# Patient Record
Sex: FEMALE | Race: WHITE | Employment: FULL TIME | URBAN - NONMETROPOLITAN AREA
[De-identification: names, ages, dates, MRNs, and addresses within clinical notes are randomized per-mention and may not be internally consistent; named-entity substitution may affect disease eponyms.]

---

## 2017-02-16 RX ORDER — FLUCONAZOLE 150 MG/1
150 TABLET ORAL ONCE
Qty: 2 TABLET | Refills: 0 | Status: SHIPPED | OUTPATIENT
Start: 2017-02-16 | End: 2017-02-16

## 2017-04-26 RX ORDER — AZITHROMYCIN 250 MG/1
TABLET, FILM COATED ORAL
Qty: 6 TABLET | Refills: 0 | Status: SHIPPED | OUTPATIENT
Start: 2017-04-26 | End: 2017-11-20

## 2017-04-26 RX ORDER — FLUCONAZOLE 150 MG/1
150 TABLET ORAL ONCE
Qty: 2 TABLET | Refills: 0 | Status: SHIPPED | OUTPATIENT
Start: 2017-04-26 | End: 2017-04-26

## 2017-06-06 ENCOUNTER — OFFICE VISIT (OUTPATIENT)
Dept: PRIMARY CARE CLINIC | Age: 62
End: 2017-06-06
Payer: COMMERCIAL

## 2017-06-06 VITALS
DIASTOLIC BLOOD PRESSURE: 81 MMHG | HEIGHT: 71 IN | HEART RATE: 78 BPM | WEIGHT: 118.06 LBS | TEMPERATURE: 98.9 F | OXYGEN SATURATION: 98 % | SYSTOLIC BLOOD PRESSURE: 134 MMHG | BODY MASS INDEX: 16.53 KG/M2

## 2017-06-06 DIAGNOSIS — Z00.00 WELLNESS EXAMINATION: Primary | ICD-10-CM

## 2017-06-06 DIAGNOSIS — Z76.89 ENCOUNTER TO ESTABLISH CARE: ICD-10-CM

## 2017-06-06 DIAGNOSIS — Z12.11 SCREENING FOR COLON CANCER: ICD-10-CM

## 2017-06-06 PROCEDURE — 99386 PREV VISIT NEW AGE 40-64: CPT | Performed by: FAMILY MEDICINE

## 2017-06-06 RX ORDER — MAGNESIUM GLUCONATE 30 MG(550)
TABLET ORAL DAILY
COMMUNITY

## 2017-06-06 RX ORDER — VITAMIN B COMPLEX
1 CAPSULE ORAL DAILY
COMMUNITY

## 2017-06-06 RX ORDER — ACETAMINOPHEN 160 MG
TABLET,DISINTEGRATING ORAL 2 TIMES DAILY
COMMUNITY

## 2017-06-06 RX ORDER — ZINC GLUCONATE 50 MG
TABLET ORAL DAILY
COMMUNITY

## 2017-06-06 RX ORDER — MAGNESIUM 200 MG
200 TABLET ORAL DAILY
COMMUNITY

## 2017-06-06 ASSESSMENT — ENCOUNTER SYMPTOMS
BACK PAIN: 0
SHORTNESS OF BREATH: 0
WHEEZING: 0
EYE PAIN: 0
COLOR CHANGE: 0
CONSTIPATION: 0
EYE REDNESS: 0
BLOOD IN STOOL: 0
TROUBLE SWALLOWING: 0
COUGH: 0
VOMITING: 0
CHEST TIGHTNESS: 0
ABDOMINAL PAIN: 0
DIARRHEA: 0

## 2017-11-20 ENCOUNTER — TELEPHONE (OUTPATIENT)
Dept: OBSTETRICS AND GYNECOLOGY | Facility: CLINIC | Age: 62
End: 2017-11-20

## 2017-11-20 DIAGNOSIS — J01.10 ACUTE NON-RECURRENT FRONTAL SINUSITIS: ICD-10-CM

## 2017-11-20 DIAGNOSIS — B37.31 YEAST VAGINITIS: Primary | ICD-10-CM

## 2017-11-20 RX ORDER — AZITHROMYCIN 250 MG/1
TABLET, FILM COATED ORAL
Qty: 6 TABLET | Refills: 0 | Status: SHIPPED | OUTPATIENT
Start: 2017-11-20 | End: 2017-11-28

## 2017-11-20 RX ORDER — FLUCONAZOLE 150 MG/1
150 TABLET ORAL
Qty: 2 TABLET | Refills: 0 | Status: SHIPPED | OUTPATIENT
Start: 2017-11-20 | End: 2017-11-28

## 2017-11-20 NOTE — TELEPHONE ENCOUNTER
Pt requesting zpack for sinus infection. Allergic to PCN. Requests diflucan also because she gets a yeast inf after abx.

## 2017-11-28 ENCOUNTER — OFFICE VISIT (OUTPATIENT)
Dept: OBSTETRICS AND GYNECOLOGY | Facility: CLINIC | Age: 62
End: 2017-11-28

## 2017-11-28 VITALS
HEIGHT: 71 IN | DIASTOLIC BLOOD PRESSURE: 82 MMHG | BODY MASS INDEX: 16.38 KG/M2 | SYSTOLIC BLOOD PRESSURE: 136 MMHG | WEIGHT: 117 LBS

## 2017-11-28 DIAGNOSIS — N89.8 VAGINAL DRYNESS: ICD-10-CM

## 2017-11-28 DIAGNOSIS — Z01.419 WELL WOMAN EXAM WITH ROUTINE GYNECOLOGICAL EXAM: Primary | ICD-10-CM

## 2017-11-28 PROCEDURE — 99396 PREV VISIT EST AGE 40-64: CPT | Performed by: NURSE PRACTITIONER

## 2017-11-28 RX ORDER — UBIDECARENONE 60 MG
CAPSULE ORAL
COMMUNITY
End: 2019-05-09

## 2017-11-28 RX ORDER — PHENOL 1.4 %
AEROSOL, SPRAY (ML) MUCOUS MEMBRANE
COMMUNITY

## 2017-11-28 RX ORDER — MAGNESIUM GLUCONATE 30 MG(550)
TABLET ORAL
COMMUNITY

## 2017-11-28 RX ORDER — VITAMIN B COMPLEX
CAPSULE ORAL
COMMUNITY

## 2017-11-28 RX ORDER — ACETAMINOPHEN 160 MG
TABLET,DISINTEGRATING ORAL
COMMUNITY

## 2017-11-28 RX ORDER — ZINC GLUCONATE 50 MG
TABLET ORAL
COMMUNITY

## 2017-11-28 NOTE — PROGRESS NOTES
"Subjective   Lilli Trivedi is a 62 y.o. female     HPI Comments: Here for yearly check up. Has no complaints.    Gynecologic Exam   The patient's pertinent negatives include no pelvic pain, vaginal bleeding or vaginal discharge. The patient is experiencing no pain. Pertinent negatives include no abdominal pain, anorexia, back pain, chills, constipation, diarrhea, discolored urine, dysuria, fever, flank pain, frequency, headaches, hematuria, joint pain, joint swelling, nausea, painful intercourse, rash, sore throat, urgency or vomiting. She is not sexually active. She uses hysterectomy for contraception. She is postmenopausal.             /82  Ht 71\" (180.3 cm)  Wt 117 lb (53.1 kg)  LMP 06/28/2001 (Exact Date)  BMI 16.32 kg/m2      Outpatient Encounter Prescriptions as of 11/28/2017   Medication Sig Dispense Refill   • b complex vitamins capsule Take  by mouth.     • calcium carbonate (OS-SHAISTA) 600 MG tablet Take  by mouth.     • Cholecalciferol (VITAMIN D3) 2000 units capsule Take  by mouth.     • Coenzyme Q10 60 MG capsule Take  by mouth.     • MAGNESIUM PO Take 200 mg by mouth.     • potassium gluconate 595 (99 K) MG tablet tablet Take  by mouth.     • THYROID PO Take  by mouth.     • VITAMIN K PO Take  by mouth.     • Zinc 50 MG tablet Take  by mouth.     • [DISCONTINUED] azithromycin (ZITHROMAX Z-RICA) 250 MG tablet Take 2 tablets the first day, then 1 tablet daily for 4 days. 6 tablet 0   • [DISCONTINUED] fluconazole (DIFLUCAN) 150 MG tablet Take 1 tablet by mouth Every 7 (Seven) Days. Take one tablet today and repeat in one week 2 tablet 0     No facility-administered encounter medications on file as of 11/28/2017.            Surgical History  Past Surgical History:   Procedure Laterality Date   • HYSTERECTOMY      with BSO         Family History  Family History   Problem Relation Age of Onset   • Lung cancer Father    • No Known Problems Mother    • Prostate cancer Brother 80   • No Known Problems " Sister    • Breast cancer Maternal Grandmother 80   • Prostate cancer Maternal Grandfather 70   • Ovarian cancer Neg Hx    • Uterine cancer Neg Hx    • Colon cancer Neg Hx    • Melanoma Neg Hx              The following portions of the patient's history were reviewed and updated as appropriate: allergies, current medications, past family history, past medical history, past social history, past surgical history and problem list.    Review of Systems   Constitutional: Negative for activity change, appetite change, chills, diaphoresis, fatigue, fever and unexpected weight change.   HENT: Negative for congestion, ear discharge, ear pain, facial swelling, hearing loss, mouth sores, nosebleeds, postnasal drip, rhinorrhea, sinus pressure, sneezing, sore throat, tinnitus, trouble swallowing and voice change.    Eyes: Negative for photophobia, pain, discharge, redness, itching and visual disturbance.   Respiratory: Negative for apnea, cough, choking, chest tightness and shortness of breath.    Cardiovascular: Negative for chest pain, palpitations and leg swelling.   Gastrointestinal: Negative for abdominal distention, abdominal pain, anal bleeding, anorexia, blood in stool, constipation, diarrhea, nausea, rectal pain and vomiting.   Endocrine: Negative for cold intolerance and heat intolerance.   Genitourinary: Negative for decreased urine volume, difficulty urinating, dyspareunia, dysuria, flank pain, frequency, genital sores, hematuria, menstrual problem, pelvic pain, urgency, vaginal bleeding, vaginal discharge and vaginal pain.   Musculoskeletal: Negative for arthralgias, back pain, joint pain, joint swelling and myalgias.   Skin: Negative for color change and rash.   Allergic/Immunologic: Negative for environmental allergies.   Neurological: Negative for dizziness, syncope, weakness, numbness and headaches.   Hematological: Negative for adenopathy.   Psychiatric/Behavioral: Negative for agitation, confusion and sleep  disturbance. The patient is not nervous/anxious.              Objective   Physical Exam   Constitutional: She is oriented to person, place, and time. She appears well-developed and well-nourished. No distress.   HENT:   Head: Normocephalic.   Right Ear: External ear normal.   Left Ear: External ear normal.   Nose: Nose normal.   Mouth/Throat: Oropharynx is clear and moist.   Eyes: Conjunctivae are normal. Right eye exhibits no discharge. Left eye exhibits no discharge. No scleral icterus.   Neck: Normal range of motion. Neck supple. Carotid bruit is not present. No tracheal deviation present. No thyromegaly present.   Cardiovascular: Normal rate, regular rhythm, normal heart sounds and intact distal pulses.    No murmur heard.  Pulmonary/Chest: Effort normal and breath sounds normal. No respiratory distress. She has no wheezes. Right breast exhibits no inverted nipple, no mass, no nipple discharge, no skin change and no tenderness. Left breast exhibits no inverted nipple, no mass, no nipple discharge, no skin change and no tenderness. Breasts are symmetrical. There is no breast swelling.   Abdominal: Soft. She exhibits no distension and no mass. There is no tenderness. There is no guarding. No hernia. Hernia confirmed negative in the right inguinal area and confirmed negative in the left inguinal area.   Genitourinary: Rectum normal and vagina normal. Rectal exam shows no mass. No breast tenderness, discharge or bleeding. Pelvic exam was performed with patient supine. There is no rash, tenderness, lesion or injury on the right labia. There is no rash, tenderness, lesion or injury on the left labia. Uterus is not fixed and not tender. Right adnexum displays no mass, no tenderness and no fullness. Left adnexum displays no mass, no tenderness and no fullness. No erythema, tenderness or bleeding in the vagina. No foreign body in the vagina. No signs of injury around the vagina. No vaginal discharge found.    Genitourinary Comments:   BSU normal  Urethral meatus  Normal  Perineum  Normal  Cervix and Uterus are surgically absent.   Vaginal vault is very atrophic.   Musculoskeletal: Normal range of motion. She exhibits no edema or tenderness.   Lymphadenopathy:        Head (right side): No submental, no submandibular, no tonsillar, no preauricular, no posterior auricular and no occipital adenopathy present.        Head (left side): No submental, no submandibular, no tonsillar, no preauricular, no posterior auricular and no occipital adenopathy present.     She has no cervical adenopathy.        Right cervical: No superficial cervical, no deep cervical and no posterior cervical adenopathy present.       Left cervical: No superficial cervical, no deep cervical and no posterior cervical adenopathy present.     She has no axillary adenopathy.        Right: No inguinal adenopathy present.        Left: No inguinal adenopathy present.   Neurological: She is alert and oriented to person, place, and time. Coordination normal.   Skin: Skin is warm and dry. No bruising and no rash noted. She is not diaphoretic. No erythema.   Psychiatric: She has a normal mood and affect. Her behavior is normal. Judgment and thought content normal.   Nursing note and vitals reviewed.        Assessment/Plan   Lilli was seen today for gynecologic exam.    Diagnoses and all orders for this visit:    Well woman exam with routine gynecological exam  Normal GYN exam. Will have lab work today . Encouraged SBE. Discussed weight management and importance of maintaining a healthy weight. Discussed Vitamin D intake and the importance of adequate vitamin D. Discussed Daily exercise and the importance of same. BMI  16.3. Pt declines Colonoscopy, had a fit test at her PCP, that was negative.   Mammogram will be scheduled at [facility]. Pap smear is not done per [facility] guidelines.    -     CBC & Differential  -     Comprehensive Metabolic Panel  -     Lipid Panel With LDL  / HDL Ratio  -     TSH  -     T3, Uptake  -     Vitamin D 25 Hydroxy  -     T4, Free  -     Hemoglobin A1c  -     UA / M With / Rflx Culture([facility] ONLY) - Urine, Clean Catch  -     DEXA Bone Density Axial; Future  -     Mammo Screening Digital Tomosynthesis Bilateral With CAD; Future    Vaginal dryness  Comments:  Pt. is having vaginal dryness. Does not want any hormones. Will try Coconut Oil daily.

## 2017-12-06 LAB
25(OH)D3+25(OH)D2 SERPL-MCNC: 66.8 NG/ML (ref 30–100)
ALBUMIN SERPL-MCNC: 4.4 G/DL (ref 3.5–5)
ALBUMIN/GLOB SERPL: 1.6 G/DL (ref 1.1–2.5)
ALP SERPL-CCNC: 104 U/L (ref 24–120)
ALT SERPL-CCNC: 42 U/L (ref 0–54)
APPEARANCE UR: CLEAR
AST SERPL-CCNC: 37 U/L (ref 7–45)
BACTERIA #/AREA URNS HPF: ABNORMAL /HPF
BASOPHILS # BLD AUTO: 0.05 10*3/MM3 (ref 0–0.2)
BASOPHILS NFR BLD AUTO: 0.6 % (ref 0–2)
BILIRUB SERPL-MCNC: 0.5 MG/DL (ref 0.1–1)
BILIRUB UR QL STRIP: NEGATIVE
BUN SERPL-MCNC: 14 MG/DL (ref 5–21)
BUN/CREAT SERPL: 18.2 (ref 7–25)
CALCIUM SERPL-MCNC: 10.1 MG/DL (ref 8.4–10.4)
CASTS URNS MICRO: ABNORMAL
CASTS URNS QL MICRO: PRESENT /LPF
CHLORIDE SERPL-SCNC: 102 MMOL/L (ref 98–110)
CHOLEST SERPL-MCNC: 145 MG/DL (ref 130–200)
CO2 SERPL-SCNC: 29 MMOL/L (ref 24–31)
COLOR UR: YELLOW
CREAT SERPL-MCNC: 0.77 MG/DL (ref 0.5–1.4)
CRYSTALS URNS MICRO: ABNORMAL
EOSINOPHIL # BLD AUTO: 0.07 10*3/MM3 (ref 0–0.7)
EOSINOPHIL NFR BLD AUTO: 0.9 % (ref 0–4)
EPI CELLS #/AREA URNS HPF: ABNORMAL /HPF
ERYTHROCYTE [DISTWIDTH] IN BLOOD BY AUTOMATED COUNT: 13.6 % (ref 12–15)
GFR SERPLBLD CREATININE-BSD FMLA CKD-EPI: 76 ML/MIN/1.73
GFR SERPLBLD CREATININE-BSD FMLA CKD-EPI: 92 ML/MIN/1.73
GLOBULIN SER CALC-MCNC: 2.7 GM/DL
GLUCOSE SERPL-MCNC: 86 MG/DL (ref 70–100)
GLUCOSE UR QL: NEGATIVE
HBA1C MFR BLD: 5.1 %
HCT VFR BLD AUTO: 45.5 % (ref 37–47)
HDLC SERPL-MCNC: 77 MG/DL
HGB BLD-MCNC: 14.6 G/DL (ref 12–16)
HGB UR QL STRIP: NEGATIVE
IMM GRANULOCYTES # BLD: 0.03 10*3/MM3 (ref 0–0.03)
IMM GRANULOCYTES NFR BLD: 0.4 % (ref 0–5)
KETONES UR QL STRIP: NEGATIVE
LDLC SERPL CALC-MCNC: 59 MG/DL (ref 0–99)
LDLC/HDLC SERPL: 0.77 {RATIO}
LEUKOCYTE ESTERASE UR QL STRIP: NEGATIVE
LYMPHOCYTES # BLD AUTO: 2.21 10*3/MM3 (ref 0.72–4.86)
LYMPHOCYTES NFR BLD AUTO: 27 % (ref 15–45)
MCH RBC QN AUTO: 30.4 PG (ref 28–32)
MCHC RBC AUTO-ENTMCNC: 32.1 G/DL (ref 33–36)
MCV RBC AUTO: 94.8 FL (ref 82–98)
MICRO URNS: NORMAL
MICRO URNS: NORMAL
MONOCYTES # BLD AUTO: 0.7 10*3/MM3 (ref 0.19–1.3)
MONOCYTES NFR BLD AUTO: 8.5 % (ref 4–12)
MUCOUS THREADS URNS QL MICRO: PRESENT /HPF
NEUTROPHILS # BLD AUTO: 5.13 10*3/MM3 (ref 1.87–8.4)
NEUTROPHILS NFR BLD AUTO: 62.6 % (ref 39–78)
NITRITE UR QL STRIP: NEGATIVE
NRBC BLD AUTO-RTO: 0 /100 WBC (ref 0–0)
PH UR STRIP: 5.5 [PH] (ref 5–7.5)
PLATELET # BLD AUTO: 249 10*3/MM3 (ref 130–400)
POTASSIUM SERPL-SCNC: 4.1 MMOL/L (ref 3.5–5.3)
PROT SERPL-MCNC: 7.1 G/DL (ref 6.3–8.7)
PROT UR QL STRIP: NEGATIVE
RBC # BLD AUTO: 4.8 10*6/MM3 (ref 4.2–5.4)
RBC #/AREA URNS HPF: ABNORMAL /HPF
SODIUM SERPL-SCNC: 142 MMOL/L (ref 135–145)
SP GR UR: 1.02 (ref 1–1.03)
T3RU NFR SERPL: 27 % (ref 24–39)
T4 FREE SERPL-MCNC: 1.13 NG/DL (ref 0.78–2.19)
TRIGL SERPL-MCNC: 45 MG/DL (ref 0–149)
TSH SERPL DL<=0.005 MIU/L-ACNC: 1.37 MIU/ML (ref 0.47–4.68)
UNIDENT CRYS URNS QL MICRO: PRESENT /LPF
URINALYSIS REFLEX: NORMAL
UROBILINOGEN UR STRIP-MCNC: 0.2 MG/DL (ref 0.2–1)
VLDLC SERPL CALC-MCNC: 9 MG/DL
WBC # BLD AUTO: 8.19 10*3/MM3 (ref 4.8–10.8)
WBC #/AREA URNS HPF: ABNORMAL /HPF

## 2017-12-12 ENCOUNTER — HOSPITAL ENCOUNTER (OUTPATIENT)
Dept: MAMMOGRAPHY | Facility: HOSPITAL | Age: 62
Discharge: HOME | End: 2017-12-12
Admitting: NURSE PRACTITIONER

## 2017-12-12 DIAGNOSIS — Z01.419 WELL WOMAN EXAM WITH ROUTINE GYNECOLOGICAL EXAM: ICD-10-CM

## 2017-12-12 PROCEDURE — G0202 SCR MAMMO BI INCL CAD: HCPCS

## 2017-12-12 PROCEDURE — 77063 BREAST TOMOSYNTHESIS BI: CPT

## 2017-12-18 ENCOUNTER — TELEPHONE (OUTPATIENT)
Dept: OBSTETRICS AND GYNECOLOGY | Facility: CLINIC | Age: 62
End: 2017-12-18

## 2017-12-18 DIAGNOSIS — J01.11 ACUTE RECURRENT FRONTAL SINUSITIS: Primary | ICD-10-CM

## 2017-12-18 RX ORDER — AZITHROMYCIN 250 MG/1
TABLET, FILM COATED ORAL
Qty: 6 TABLET | Refills: 0 | Status: SHIPPED | OUTPATIENT
Start: 2017-12-18 | End: 2018-11-14

## 2017-12-18 NOTE — TELEPHONE ENCOUNTER
Pt is calling requesting a zpac sent in. Advised pt Ju was out of the office and she said she would wait until she got back. She has cough and sinus congestion. Would like it sent to [facility]

## 2018-01-18 ENCOUNTER — OFFICE VISIT (OUTPATIENT)
Dept: OBSTETRICS AND GYNECOLOGY | Facility: CLINIC | Age: 63
End: 2018-01-18

## 2018-01-18 VITALS
DIASTOLIC BLOOD PRESSURE: 64 MMHG | SYSTOLIC BLOOD PRESSURE: 108 MMHG | HEIGHT: 71 IN | BODY MASS INDEX: 16.38 KG/M2 | WEIGHT: 117 LBS

## 2018-01-18 DIAGNOSIS — R42 DIZZINESS: Primary | ICD-10-CM

## 2018-01-18 DIAGNOSIS — R09.89 BILATERAL CAROTID BRUITS: ICD-10-CM

## 2018-01-18 DIAGNOSIS — B37.9 CANDIDIASIS: ICD-10-CM

## 2018-01-18 PROCEDURE — 99213 OFFICE O/P EST LOW 20 MIN: CPT | Performed by: NURSE PRACTITIONER

## 2018-01-18 RX ORDER — FLUCONAZOLE 150 MG/1
150 TABLET ORAL ONCE
Qty: 2 TABLET | Refills: 0 | Status: SHIPPED | OUTPATIENT
Start: 2018-01-18 | End: 2018-01-18

## 2018-01-18 NOTE — PROGRESS NOTES
"Subjective     Lilli Trivedi is a 62 y.o. female    HPI Comments: Here for c/o dizziness.    Dizziness   This is a recurrent problem. The current episode started more than 1 month ago. The problem has been waxing and waning. Pertinent negatives include no abdominal pain, anorexia, arthralgias, change in bowel habit, chest pain, chills, congestion, coughing, diaphoresis, fatigue, fever, headaches, joint swelling, myalgias, nausea, neck pain, numbness, rash, sore throat, swollen glands, urinary symptoms, vertigo, visual change, vomiting or weakness. Nothing aggravates the symptoms. She has tried nothing for the symptoms.         /64  Ht 180.3 cm (71\")  Wt 53.1 kg (117 lb)  LMP 06/28/2001 (Exact Date)  BMI 16.32 kg/m2    Outpatient Encounter Prescriptions as of 1/18/2018   Medication Sig Dispense Refill   • b complex vitamins capsule Take  by mouth.     • calcium carbonate (OS-SHAISTA) 600 MG tablet Take  by mouth.     • Cholecalciferol (VITAMIN D3) 2000 units capsule Take  by mouth.     • Coenzyme Q10 60 MG capsule Take  by mouth.     • MAGNESIUM PO Take 200 mg by mouth.     • potassium gluconate 595 (99 K) MG tablet tablet Take  by mouth.     • THYROID PO Take  by mouth.     • Zinc 50 MG tablet Take  by mouth.     • azithromycin (ZITHROMAX Z-RICA) 250 MG tablet Take 2 tablets the first day, then 1 tablet daily for 4 days. 6 tablet 0   • fluconazole (DIFLUCAN) 150 MG tablet Take 1 tablet by mouth 1 (One) Time for 1 dose. Take once a week for 2 weeks. 2 tablet 0   • VITAMIN K PO Take  by mouth.       No facility-administered encounter medications on file as of 1/18/2018.        Surgical History  Past Surgical History:   Procedure Laterality Date   • HYSTERECTOMY      with BSO       Family History  Family History   Problem Relation Age of Onset   • Lung cancer Father    • No Known Problems Mother    • Prostate cancer Brother 80   • No Known Problems Sister    • Breast cancer Maternal Grandmother 80   • Prostate " cancer Maternal Grandfather 70   • Ovarian cancer Neg Hx    • Uterine cancer Neg Hx    • Colon cancer Neg Hx    • Melanoma Neg Hx        The following portions of the patient's history were reviewed and updated as appropriate: allergies, current medications, past family history, past medical history, past social history, past surgical history and problem list.    Review of Systems   Constitutional: Negative for activity change, appetite change, chills, diaphoresis, fatigue, fever and unexpected weight change.   HENT: Negative for congestion, dental problem, drooling, ear discharge, ear pain, facial swelling, hearing loss, mouth sores, nosebleeds, postnasal drip, rhinorrhea, sinus pain, sinus pressure, sneezing, sore throat, tinnitus, trouble swallowing and voice change.    Eyes: Negative for photophobia, pain, discharge, redness, itching and visual disturbance.   Respiratory: Negative for apnea, cough, choking, chest tightness, shortness of breath, wheezing and stridor.    Cardiovascular: Negative for chest pain, palpitations and leg swelling.   Gastrointestinal: Negative for abdominal distention, abdominal pain, anal bleeding, anorexia, blood in stool, change in bowel habit, constipation, diarrhea, nausea, rectal pain and vomiting.   Endocrine: Negative for cold intolerance, heat intolerance, polydipsia, polyphagia and polyuria.   Genitourinary: Negative for decreased urine volume, difficulty urinating, dyspareunia, dysuria, enuresis, flank pain, frequency, genital sores, hematuria, menstrual problem, pelvic pain, urgency, vaginal bleeding, vaginal discharge and vaginal pain.   Musculoskeletal: Negative for arthralgias, back pain, gait problem, joint swelling, myalgias, neck pain and neck stiffness.   Skin: Negative for color change, pallor, rash and wound.   Allergic/Immunologic: Negative for environmental allergies, food allergies and immunocompromised state.   Neurological: Positive for dizziness. Negative for  vertigo, tremors, seizures, syncope, facial asymmetry, speech difficulty, weakness, light-headedness, numbness and headaches.   Hematological: Negative for adenopathy. Does not bruise/bleed easily.   Psychiatric/Behavioral: Negative for agitation, behavioral problems, confusion, decreased concentration, dysphoric mood, hallucinations, self-injury, sleep disturbance and suicidal ideas. The patient is not nervous/anxious and is not hyperactive.        Objective   Physical Exam   Constitutional: She is oriented to person, place, and time. She appears well-developed and well-nourished.   HENT:   Head: Normocephalic and atraumatic.   Eyes: Conjunctivae are normal. Right eye exhibits no discharge. Left eye exhibits no discharge.   Neck: Normal range of motion. Neck supple. Carotid bruit is present. No thyromegaly present.   Cardiovascular: Normal rate, regular rhythm and normal heart sounds.    Pulmonary/Chest: Effort normal and breath sounds normal.   Neurological: She is alert and oriented to person, place, and time.   Skin: Skin is warm and dry.   Psychiatric: She has a normal mood and affect. Her behavior is normal. Judgment and thought content normal.   Nursing note and vitals reviewed.      Assessment/Plan   Lilli was seen today for results.    Diagnoses and all orders for this visit:    Dizziness  Comments:  Pt has occassional dizziness.  Orders:  -     US Carotid Bilateral; Future    Bilateral carotid bruits  Comments:  Pt has very mild Bruits bilateral. Will have carotid study at [facility].   Orders:  -     US Carotid Bilateral; Future    Candidiasis  Comments:  Feels she is getting a yeast infection and requests Diflucan.  Orders:  -     fluconazole (DIFLUCAN) 150 MG tablet; Take 1 tablet by mouth 1 (One) Time for 1 dose. Take once a week for 2 weeks.

## 2018-02-02 ENCOUNTER — APPOINTMENT (OUTPATIENT)
Dept: ULTRASOUND IMAGING | Facility: HOSPITAL | Age: 63
End: 2018-02-02

## 2018-02-13 ENCOUNTER — HOSPITAL ENCOUNTER (OUTPATIENT)
Dept: ULTRASOUND IMAGING | Facility: HOSPITAL | Age: 63
Discharge: HOME | End: 2018-02-13
Admitting: NURSE PRACTITIONER

## 2018-02-13 DIAGNOSIS — R09.89 BILATERAL CAROTID BRUITS: ICD-10-CM

## 2018-02-13 DIAGNOSIS — R42 DIZZINESS: ICD-10-CM

## 2018-02-13 PROCEDURE — 93880 EXTRACRANIAL BILAT STUDY: CPT | Performed by: SURGERY

## 2018-02-13 PROCEDURE — 93880 EXTRACRANIAL BILAT STUDY: CPT

## 2018-02-14 DIAGNOSIS — R09.89 BRUIT OF LEFT CAROTID ARTERY: Primary | ICD-10-CM

## 2018-02-20 ENCOUNTER — APPOINTMENT (OUTPATIENT)
Dept: ULTRASOUND IMAGING | Facility: HOSPITAL | Age: 63
End: 2018-02-20

## 2018-02-23 ENCOUNTER — TELEPHONE (OUTPATIENT)
Dept: VASCULAR SURGERY | Facility: CLINIC | Age: 63
End: 2018-02-23

## 2018-02-23 NOTE — TELEPHONE ENCOUNTER
Left message for Mrs Trivedi and let her know we had to move her appointment from Thursday, March 8th, 2018 to Monday, February 26th, 2018 at 1 pm and advised if she had any questions or needed to reschedule to please call the office at [phone].

## 2018-02-26 ENCOUNTER — OFFICE VISIT (OUTPATIENT)
Dept: VASCULAR SURGERY | Facility: CLINIC | Age: 63
End: 2018-02-26

## 2018-02-26 ENCOUNTER — TELEPHONE (OUTPATIENT)
Dept: VASCULAR SURGERY | Facility: CLINIC | Age: 63
End: 2018-02-26

## 2018-02-26 VITALS
SYSTOLIC BLOOD PRESSURE: 110 MMHG | DIASTOLIC BLOOD PRESSURE: 70 MMHG | HEIGHT: 71 IN | OXYGEN SATURATION: 99 % | WEIGHT: 124 LBS | BODY MASS INDEX: 17.36 KG/M2 | HEART RATE: 87 BPM

## 2018-02-26 DIAGNOSIS — I65.23 BILATERAL CAROTID ARTERY STENOSIS: Primary | ICD-10-CM

## 2018-02-26 PROCEDURE — 99213 OFFICE O/P EST LOW 20 MIN: CPT | Performed by: NURSE PRACTITIONER

## 2018-02-26 NOTE — PROGRESS NOTES
02/26/2018      Ju Hawkins, APRN  [address]  [address]  [place] [address]    Lilli Trivedi  1955    Chief Complaint   Patient presents with   • Carotid Artery Disease     Carotid Bruite        Dear RASHI Yo:      HPI  I had the pleasure of seeing your patient Lilli Trivedi in the office today.  Thank you kindly for this consultation.  As you recall, Lilli Trivedi is a 62 y.o.  female who you are currently following for routine gynecological care.  She did have some recent complaints of dizziness.  She was also noted to have a carotid bruit on exam on the left and sent for testing.  She has been to our office in the past but has been several years ago.  She denies any strokelike symptoms or claudicating symptoms to her lower extremities.She did have recent testing including a carotid duplex, which I did review in office.    History reviewed. No pertinent past medical history.    Past Surgical History:   Procedure Laterality Date   • HYSTERECTOMY      with BSO       Family History   Problem Relation Age of Onset   • Lung cancer Father    • No Known Problems Mother    • Prostate cancer Brother 80   • No Known Problems Sister    • Breast cancer Maternal Grandmother 80   • Prostate cancer Maternal Grandfather 70   • Ovarian cancer Neg Hx    • Uterine cancer Neg Hx    • Colon cancer Neg Hx    • Melanoma Neg Hx        Social History     Social History   • Marital status:      Spouse name: N/A   • Number of children: N/A   • Years of education: N/A     Occupational History   • Not on file.     Social History Main Topics   • Smoking status: Never Smoker   • Smokeless tobacco: Never Used   • Alcohol use Yes   • Drug use: Not on file   • Sexual activity: Yes     Birth control/ protection: Post-menopausal     Other Topics Concern   • Not on file     Social History Narrative       Allergies   Allergen Reactions   • Penicillins Shortness Of Breath   • Peanut-Containing Drug Products        Prior to  "Admission medications    Medication Sig Start Date End Date Taking? Authorizing Provider   azithromycin (ZITHROMAX Z-RICA) 250 MG tablet Take 2 tablets the first day, then 1 tablet daily for 4 days. 12/18/17   RASHI Yo complex vitamins capsule Take  by mouth.    Historical Provider, MD   calcium carbonate (OS-SHAISTA) 600 MG tablet Take  by mouth.    Historical Provider, MD   Cholecalciferol (VITAMIN D3) 2000 units capsule Take  by mouth.    Historical Provider, MD   Coenzyme Q10 60 MG capsule Take  by mouth.    Historical Provider, MD   MAGNESIUM PO Take 200 mg by mouth.    Historical Provider, MD   potassium gluconate 595 (99 K) MG tablet tablet Take  by mouth.    Historical Provider, MD   THYROID PO Take  by mouth.    Historical Provider, MD   VITAMIN K PO Take  by mouth.    Historical Provider, MD   Zinc 50 MG tablet Take  by mouth.    Historical Provider, MD       Review of Systems   Constitutional: Negative.    HENT: Negative.    Eyes: Negative.    Respiratory: Negative.    Cardiovascular: Negative.    Gastrointestinal: Negative.    Endocrine: Negative.    Genitourinary: Negative.    Musculoskeletal: Negative.    Skin: Negative.    Allergic/Immunologic: Negative.    Neurological: Negative.    Hematological: Negative.    Psychiatric/Behavioral: Negative.        /70 (BP Location: Left arm, Patient Position: Sitting)  Pulse 87  Ht 180 cm (70.87\")  Wt 56.2 kg (124 lb)  LMP 06/28/2001 (Exact Date)  SpO2 99%  BMI 17.36 kg/m2  Physical Exam   Constitutional: She is oriented to person, place, and time. She appears well-developed and well-nourished. No distress.   HENT:   Head: Normocephalic and atraumatic.   Mouth/Throat: Oropharynx is clear and moist.   Eyes: Pupils are equal, round, and reactive to light. No scleral icterus.   Neck: Normal range of motion. Neck supple. No JVD present. Carotid bruit is present (left ). No thyromegaly present.   Cardiovascular: Normal rate, regular rhythm, S2 " normal, normal heart sounds, intact distal pulses and normal pulses.  Exam reveals no gallop and no friction rub.    No murmur heard.  Pulmonary/Chest: Effort normal and breath sounds normal.   Abdominal: Soft. Normal aorta and bowel sounds are normal. There is no hepatosplenomegaly.   Musculoskeletal: Normal range of motion.   Neurological: She is alert and oriented to person, place, and time. No cranial nerve deficit.   Skin: Skin is warm and dry. She is not diaphoretic.   Psychiatric: She has a normal mood and affect. Her behavior is normal. Judgment and thought content normal.   Nursing note and vitals reviewed.      Us Carotid Bilateral    Result Date: 2/13/2018  Narrative: History: Carotid occlusive disease      Impression: Impression: 1. There is less than 50% stenosis of the right internal carotid artery. 2. There is less than 50% stenosis of the left internal carotid artery. 3. Antegrade flow is demonstrated in bilateral vertebral arteries.  Comments: Bilateral carotid vertebral arterial duplex scan was performed.  Grayscale imaging shows intimal thickening and calcified elements at the carotid bifurcation. The right internal carotid artery peak systolic velocity is 88.5 cm/sec. The end-diastolic velocity is 32.2 cm/sec. The right ICA/CCA ratio is approximately 0.82 . These findings correlate with less than 50% stenosis of the right internal carotid artery.  Grayscale imaging shows intimal thickening and calcified elements at the carotid bifurcation. The left internal carotid artery peak systolic velocity is 82.1 cm/sec. The end-diastolic velocity is 28.1 cm/sec. The left ICA/CCA ratio is approximately 0.63 . These findings correlate with less than 50% stenosis of the left internal carotid artery.  Antegrade flow is demonstrated in bilateral vertebral arteries. There is greater than 50% stenosis in the left common carotid artery. This report was finalized on 02/13/2018 18:26 by Dr. Sandor Rene,  MD.      There is no problem list on file for this patient.        ICD-10-CM ICD-9-CM   1. Bilateral carotid artery stenosis I65.23 433.10     433.30       Lab Frequency Next Occurrence   DEXA Bone Density Axial Once 12/3/2017   US Carotid Bilateral Once 2/26/2019       Plan: After thoroughly evaluating Lilli Trivedi, I believe the best course of action is to remain conservative from a vascular standpoint.  We will see Lilli Trivedi back in 1 year with repeat noninvasive testing for continued surveillance, including a carotid duplex.  She should continue diet and exercise.  The patient can continue taking her current medication regimen as previously planned.  This was all discussed in full with complete understanding.    Thank you for allowing me to participate in the care of your patient.  Please do not hesitate with any questions or concerns.  I will keep you aware of any further encounters with Lilli Trivedi.        Sincerely yours,         RASHI Lira    No Known Provider

## 2018-06-04 ENCOUNTER — TELEPHONE (OUTPATIENT)
Dept: PRIMARY CARE CLINIC | Age: 63
End: 2018-06-04

## 2018-09-05 RX ORDER — AZITHROMYCIN 250 MG/1
TABLET, FILM COATED ORAL
Qty: 6 TABLET | Refills: 0 | OUTPATIENT
Start: 2018-09-05

## 2018-11-07 ENCOUNTER — TELEPHONE (OUTPATIENT)
Dept: OBSTETRICS AND GYNECOLOGY | Facility: CLINIC | Age: 63
End: 2018-11-07

## 2018-11-07 DIAGNOSIS — B37.9 CANDIDIASIS: Primary | ICD-10-CM

## 2018-11-07 RX ORDER — FLUCONAZOLE 150 MG/1
150 TABLET ORAL ONCE
Qty: 2 TABLET | Refills: 0 | Status: SHIPPED | OUTPATIENT
Start: 2018-11-07 | End: 2018-11-07

## 2018-11-14 ENCOUNTER — TELEPHONE (OUTPATIENT)
Dept: OBSTETRICS AND GYNECOLOGY | Facility: CLINIC | Age: 63
End: 2018-11-14

## 2018-11-14 DIAGNOSIS — J34.89 SINUS DRAINAGE: Primary | ICD-10-CM

## 2018-11-14 RX ORDER — AZITHROMYCIN 250 MG/1
TABLET, FILM COATED ORAL
Qty: 6 TABLET | Refills: 0 | Status: SHIPPED | OUTPATIENT
Start: 2018-11-14 | End: 2019-05-09

## 2018-11-14 NOTE — TELEPHONE ENCOUNTER
Pt is calling asking if she can get a zpac sent to [facility] pharmacy. She is home sick from work today.   I also let pt know she is due for yearly end of this month and she will call to schedule that.

## 2019-01-31 ENCOUNTER — TELEPHONE (OUTPATIENT)
Dept: VASCULAR SURGERY | Facility: CLINIC | Age: 64
End: 2019-01-31

## 2019-01-31 NOTE — TELEPHONE ENCOUNTER
Patient called in to cancel her testing and office visit.  She said she did not want to reschedule at this time.

## 2019-03-04 ENCOUNTER — TELEPHONE (OUTPATIENT)
Dept: VASCULAR SURGERY | Facility: CLINIC | Age: 64
End: 2019-03-04

## 2019-03-04 NOTE — TELEPHONE ENCOUNTER
Left message for Ms Trivedi in regards to testing and office appointments missed on 02/12/2019. I ask she call the office at [phone] to get these all rescheduled.

## 2019-05-09 ENCOUNTER — OFFICE VISIT (OUTPATIENT)
Dept: OBSTETRICS AND GYNECOLOGY | Facility: CLINIC | Age: 64
End: 2019-05-09

## 2019-05-09 VITALS
HEIGHT: 71 IN | BODY MASS INDEX: 16.52 KG/M2 | DIASTOLIC BLOOD PRESSURE: 68 MMHG | WEIGHT: 118 LBS | SYSTOLIC BLOOD PRESSURE: 122 MMHG

## 2019-05-09 DIAGNOSIS — N89.8 VAGINAL DISCHARGE: Primary | ICD-10-CM

## 2019-05-09 DIAGNOSIS — R35.0 URINARY FREQUENCY: ICD-10-CM

## 2019-05-09 PROCEDURE — 87512 GARDNER VAG DNA QUANT: CPT | Performed by: NURSE PRACTITIONER

## 2019-05-09 PROCEDURE — 87798 DETECT AGENT NOS DNA AMP: CPT | Performed by: NURSE PRACTITIONER

## 2019-05-09 PROCEDURE — 87661 TRICHOMONAS VAGINALIS AMPLIF: CPT | Performed by: NURSE PRACTITIONER

## 2019-05-09 PROCEDURE — 87481 CANDIDA DNA AMP PROBE: CPT | Performed by: NURSE PRACTITIONER

## 2019-05-09 PROCEDURE — 99213 OFFICE O/P EST LOW 20 MIN: CPT | Performed by: NURSE PRACTITIONER

## 2019-05-09 RX ORDER — FLUCONAZOLE 150 MG/1
150 TABLET ORAL ONCE
Qty: 2 TABLET | Refills: 0 | Status: SHIPPED | OUTPATIENT
Start: 2019-05-09 | End: 2019-05-09

## 2019-05-09 NOTE — PROGRESS NOTES
"Subjective     Lilli Trivedi is a 63 y.o. female    Patient is here today for complaint of vaginal irritation burning and discharge also has some urinary frequency.  She has not had her yearly checkup in several years plans on making that appointment on a different date.      Vaginal Discharge   The patient's primary symptoms include genital itching and vaginal discharge. The patient's pertinent negatives include no pelvic pain or vaginal bleeding. This is a recurrent problem. The current episode started in the past 7 days. The problem occurs daily. The problem has been unchanged. The patient is experiencing no pain. She is not pregnant. Associated symptoms include frequency. Pertinent negatives include no abdominal pain, anorexia, back pain, chills, constipation, diarrhea, discolored urine, dysuria, fever, flank pain, headaches, hematuria, joint pain, joint swelling, nausea, painful intercourse, rash, sore throat, urgency or vomiting. The vaginal discharge was white. There has been no bleeding. She is not sexually active. She is postmenopausal.         /68   Ht 180.3 cm (71\")   Wt 53.5 kg (118 lb)   LMP 06/28/2001 (Exact Date)   Breastfeeding? No   BMI 16.46 kg/m²     Outpatient Encounter Medications as of 5/9/2019   Medication Sig Dispense Refill   • b complex vitamins capsule Take  by mouth.     • calcium carbonate (OS-SHAISTA) 600 MG tablet Take  by mouth.     • Cholecalciferol (VITAMIN D3) 2000 units capsule Take  by mouth.     • MAGNESIUM PO Take 200 mg by mouth.     • potassium gluconate 595 (99 K) MG tablet tablet Take  by mouth.     • THYROID PO Take  by mouth.     • Zinc 50 MG tablet Take  by mouth.     • fluconazole (DIFLUCAN) 150 MG tablet Take 1 tablet by mouth 1 (One) Time for 1 dose. Take once a week for 2 weeks. 2 tablet 0   • [DISCONTINUED] azithromycin (ZITHROMAX Z-RICA) 250 MG tablet Take 2 tablets the first day, then 1 tablet daily for 4 days. 6 tablet 0   • [DISCONTINUED] Coenzyme Q10 60 MG " capsule Take  by mouth.     • [DISCONTINUED] VITAMIN K PO Take  by mouth.       No facility-administered encounter medications on file as of 5/9/2019.        Surgical History  Past Surgical History:   Procedure Laterality Date   • HYSTERECTOMY      with BSO       Family History  Family History   Problem Relation Age of Onset   • Lung cancer Father    • No Known Problems Mother    • Prostate cancer Brother 80   • No Known Problems Sister    • Breast cancer Maternal Grandmother 80   • Prostate cancer Maternal Grandfather 70   • Ovarian cancer Neg Hx    • Uterine cancer Neg Hx    • Colon cancer Neg Hx    • Melanoma Neg Hx        The following portions of the patient's history were reviewed and updated as appropriate: allergies, current medications, past family history, past medical history, past social history, past surgical history and problem list.    Review of Systems   Constitutional: Negative for activity change, appetite change, chills, diaphoresis, fatigue, fever, unexpected weight gain and unexpected weight loss.   HENT: Negative for congestion, dental problem, drooling, ear discharge, ear pain, facial swelling, hearing loss, mouth sores, nosebleeds, postnasal drip, rhinorrhea, sinus pressure, sneezing, sore throat, tinnitus, trouble swallowing and voice change.    Eyes: Negative for blurred vision, double vision, photophobia, pain, discharge, redness, itching and visual disturbance.   Respiratory: Negative for apnea, cough, choking, chest tightness, shortness of breath, wheezing and stridor.    Cardiovascular: Negative for chest pain, palpitations and leg swelling.   Gastrointestinal: Negative for abdominal distention, abdominal pain, anal bleeding, anorexia, blood in stool, constipation, diarrhea, nausea, rectal pain, vomiting, GERD and indigestion.   Endocrine: Negative for cold intolerance, heat intolerance, polydipsia, polyphagia and polyuria.   Genitourinary: Positive for frequency and vaginal discharge.  Negative for breast discharge, urinary incontinence, decreased libido, decreased urine volume, difficulty urinating, dyspareunia, dysuria, flank pain, genital sores, hematuria, menstrual problem, pelvic pain, urgency, vaginal bleeding, vaginal pain, breast lump and breast pain.   Musculoskeletal: Negative for arthralgias, back pain, gait problem, joint pain, joint swelling, myalgias, neck pain, neck stiffness and bursitis.   Skin: Negative for color change, dry skin and rash.   Allergic/Immunologic: Negative for environmental allergies, food allergies and immunocompromised state.   Neurological: Negative for dizziness, tremors, seizures, syncope, facial asymmetry, speech difficulty, weakness, light-headedness, numbness, headache, memory problem and confusion.   Hematological: Negative for adenopathy. Does not bruise/bleed easily.   Psychiatric/Behavioral: Negative for agitation, behavioral problems, decreased concentration, dysphoric mood, hallucinations, self-injury, sleep disturbance, suicidal ideas, negative for hyperactivity, depressed mood and stress. The patient is not nervous/anxious.        Objective   Physical Exam   Constitutional: She is oriented to person, place, and time. She appears well-developed and well-nourished.   HENT:   Head: Normocephalic and atraumatic.   Cardiovascular: Normal rate and regular rhythm.   Abdominal: Soft. Bowel sounds are normal. She exhibits no distension. There is no tenderness. Hernia confirmed negative in the right inguinal area and confirmed negative in the left inguinal area.   Genitourinary: There is tenderness on the right labia. There is no rash or lesion on the right labia. There is tenderness on the left labia. There is no rash or lesion on the left labia. Cervix exhibits no discharge. Right adnexum displays no mass, no tenderness and no fullness. Left adnexum displays no mass, no tenderness and no fullness. There is erythema and tenderness in the vagina. Vaginal  discharge found.   Genitourinary Comments: Vaginal vault is atrophic.  Cervix uterus and adnexa are surgically absent   Lymphadenopathy:        Right: No inguinal adenopathy present.        Left: No inguinal adenopathy present.   Neurological: She is alert and oriented to person, place, and time.   Skin: Skin is warm and dry.   Psychiatric: She has a normal mood and affect. Her behavior is normal. Judgment and thought content normal.   Nursing note and vitals reviewed.      Assessment/Plan   Lilli was seen today for vaginitis.    Diagnoses and all orders for this visit:    Vaginal discharge  Comments:  Patient has complaint of vaginal discharge and burning.  She is very dry on exam.  BV panel is sent to [facility] she is given Diflucan today.  Orders:  -     Gynecologic Fluid, Supplemental Testing  -     fluconazole (DIFLUCAN) 150 MG tablet; Take 1 tablet by mouth 1 (One) Time for 1 dose. Take once a week for 2 weeks.    Urinary frequency  Comments:  Urine is sent for culture.  Orders:  -     UA / M With / Rflx Culture([facility] ONLY) - Urine, Clean Catch         Patient's Body mass index is 16.46 kg/m². BMI is normal.      Ju Hawkins, APRN  5/9/2019

## 2019-05-09 NOTE — PROGRESS NOTES
Attempted to obtain health maintenance information, patient unable to provide answers for the following items Pneumococcal Vaccine, Medicare Annual Wellness Exam, Diabetic Eye Exam, Diabetic Foot Exam, HPV Vaccine, Chlamydia Screening , Influenza Vaccine and Zoster Vaccine.

## 2019-05-09 NOTE — PATIENT INSTRUCTIONS

## 2019-05-10 LAB
APPEARANCE UR: CLEAR
BACTERIA #/AREA URNS HPF: ABNORMAL /HPF
BILIRUB UR QL STRIP: NEGATIVE
COLOR UR: YELLOW
CRYSTALS URNS MICRO: ABNORMAL
EPI CELLS #/AREA URNS HPF: ABNORMAL /HPF
GLUCOSE UR QL: ABNORMAL
HGB UR QL STRIP: ABNORMAL
KETONES UR QL STRIP: NEGATIVE
LEUKOCYTE ESTERASE UR QL STRIP: NEGATIVE
MICRO URNS: ABNORMAL
MUCOUS THREADS URNS QL MICRO: PRESENT /HPF
NITRITE UR QL STRIP: NEGATIVE
PH UR STRIP: 5.5 [PH] (ref 5–7.5)
PROT UR QL STRIP: NEGATIVE
RBC #/AREA URNS HPF: ABNORMAL /HPF
SP GR UR: 1.03 (ref 1–1.03)
UNIDENT CRYS URNS QL MICRO: PRESENT /LPF
URINALYSIS REFLEX: ABNORMAL
UROBILINOGEN UR STRIP-MCNC: 0.2 MG/DL (ref 0.2–1)
WBC #/AREA URNS HPF: ABNORMAL /HPF

## 2019-05-13 LAB — TRICHOMONAS VAGINALIS PCR: NOT DETECTED

## 2019-05-15 ENCOUNTER — TELEPHONE (OUTPATIENT)
Dept: OBSTETRICS AND GYNECOLOGY | Facility: CLINIC | Age: 64
End: 2019-05-15

## 2019-05-15 LAB
GEN CATEG CVX/VAG CYTO-IMP: NORMAL
LAB AP CASE REPORT: NORMAL
Lab: NORMAL
PATH INTERP SPEC-IMP: NORMAL
STAT OF ADQ CVX/VAG CYTO-IMP: NORMAL

## 2019-09-26 ENCOUNTER — OFFICE VISIT (OUTPATIENT)
Dept: OBSTETRICS AND GYNECOLOGY | Facility: CLINIC | Age: 64
End: 2019-09-26

## 2019-09-26 VITALS
DIASTOLIC BLOOD PRESSURE: 78 MMHG | BODY MASS INDEX: 15.4 KG/M2 | SYSTOLIC BLOOD PRESSURE: 132 MMHG | WEIGHT: 110 LBS | HEIGHT: 71 IN

## 2019-09-26 DIAGNOSIS — Z12.31 ENCOUNTER FOR SCREENING MAMMOGRAM FOR BREAST CANCER: ICD-10-CM

## 2019-09-26 DIAGNOSIS — J32.0 CHRONIC MAXILLARY SINUSITIS: ICD-10-CM

## 2019-09-26 DIAGNOSIS — R09.89 BRUIT: ICD-10-CM

## 2019-09-26 DIAGNOSIS — Z13.820 ENCOUNTER FOR SCREENING FOR OSTEOPOROSIS: ICD-10-CM

## 2019-09-26 DIAGNOSIS — Z01.419 WELL WOMAN EXAM WITH ROUTINE GYNECOLOGICAL EXAM: Primary | ICD-10-CM

## 2019-09-26 DIAGNOSIS — Z80.3 FAMILY HISTORY OF BREAST CANCER: ICD-10-CM

## 2019-09-26 PROCEDURE — 99396 PREV VISIT EST AGE 40-64: CPT | Performed by: NURSE PRACTITIONER

## 2019-09-26 RX ORDER — AZITHROMYCIN 250 MG/1
TABLET, FILM COATED ORAL
Qty: 6 TABLET | Refills: 0 | Status: SHIPPED | OUTPATIENT
Start: 2019-09-26 | End: 2021-12-15

## 2019-09-26 RX ORDER — FLUCONAZOLE 150 MG/1
150 TABLET ORAL ONCE
Qty: 2 TABLET | Refills: 0 | Status: SHIPPED | OUTPATIENT
Start: 2019-09-26 | End: 2019-09-26

## 2019-09-26 NOTE — PROGRESS NOTES
"Subjective     Lilli Trivedi is a 64 y.o. female    Patient is here today for yearly checkup she has no complaints.      Gynecologic Exam   The patient's pertinent negatives include no pelvic pain, vaginal bleeding or vaginal discharge. The patient is experiencing no pain. Pertinent negatives include no abdominal pain, anorexia, back pain, chills, constipation, diarrhea, discolored urine, dysuria, fever, flank pain, frequency, headaches, hematuria, joint pain, joint swelling, nausea, painful intercourse, rash, sore throat, urgency or vomiting. She is sexually active. She is postmenopausal.         /78   Ht 180.3 cm (71\")   Wt 49.9 kg (110 lb)   LMP 06/28/2001 (Exact Date)   Breastfeeding? No   BMI 15.34 kg/m²     Outpatient Encounter Medications as of 9/26/2019   Medication Sig Dispense Refill   • b complex vitamins capsule Take  by mouth.     • calcium carbonate (OS-SHAISTA) 600 MG tablet Take  by mouth.     • Cholecalciferol (VITAMIN D3) 2000 units capsule Take  by mouth.     • MAGNESIUM PO Take 200 mg by mouth.     • potassium gluconate 595 (99 K) MG tablet tablet Take  by mouth.     • THYROID PO Take  by mouth.     • Zinc 50 MG tablet Take  by mouth.     • azithromycin (ZITHROMAX Z-RICA) 250 MG tablet Take 2 tablets the first day, then 1 tablet daily for 4 days. 6 tablet 0   • fluconazole (DIFLUCAN) 150 MG tablet Take 1 tablet by mouth 1 (One) Time for 1 dose. Take once a week for 2 weeks. 2 tablet 0     No facility-administered encounter medications on file as of 9/26/2019.        Surgical History  Past Surgical History:   Procedure Laterality Date   • HYSTERECTOMY      with BSO   • TONSILLECTOMY         Family History  Family History   Problem Relation Age of Onset   • Lung cancer Father    • No Known Problems Mother    • Prostate cancer Brother 80   • No Known Problems Sister    • Breast cancer Maternal Grandmother 80   • Prostate cancer Maternal Grandfather 70   • Ovarian cancer Neg Hx    • Uterine " cancer Neg Hx    • Colon cancer Neg Hx    • Melanoma Neg Hx        The following portions of the patient's history were reviewed and updated as appropriate: allergies, current medications, past family history, past medical history, past social history, past surgical history and problem list.    Review of Systems   Constitutional: Negative for activity change, appetite change, chills, diaphoresis, fatigue, fever, unexpected weight gain and unexpected weight loss.   HENT: Negative for congestion, dental problem, drooling, ear discharge, ear pain, facial swelling, hearing loss, mouth sores, nosebleeds, postnasal drip, rhinorrhea, sinus pressure, sneezing, sore throat, swollen glands, tinnitus, trouble swallowing and voice change.    Eyes: Negative for blurred vision, double vision, photophobia, pain, discharge, redness, itching and visual disturbance.   Respiratory: Negative for apnea, cough, choking, chest tightness, shortness of breath, wheezing and stridor.    Cardiovascular: Negative for chest pain, palpitations and leg swelling.   Gastrointestinal: Negative for abdominal distention, abdominal pain, anal bleeding, anorexia, blood in stool, constipation, diarrhea, nausea, rectal pain, vomiting, GERD and indigestion.   Endocrine: Negative for cold intolerance, heat intolerance, polydipsia, polyphagia and polyuria.   Genitourinary: Negative for amenorrhea, breast discharge, breast lump, breast pain, decreased libido, decreased urine volume, difficulty urinating, dyspareunia, dysuria, flank pain, frequency, genital sores, hematuria, menstrual problem, pelvic pain, pelvic pressure, urgency, urinary incontinence, vaginal bleeding, vaginal discharge and vaginal pain.   Musculoskeletal: Negative for arthralgias, back pain, gait problem, joint pain, joint swelling, myalgias, neck pain, neck stiffness and bursitis.   Skin: Negative for color change, dry skin and rash.   Allergic/Immunologic: Negative for environmental  allergies, food allergies and immunocompromised state.   Neurological: Negative for dizziness, tremors, seizures, syncope, facial asymmetry, speech difficulty, weakness, light-headedness, numbness, headache, memory problem and confusion.   Hematological: Negative for adenopathy. Does not bruise/bleed easily.   Psychiatric/Behavioral: Negative for agitation, behavioral problems, decreased concentration, dysphoric mood, hallucinations, self-injury, sleep disturbance, suicidal ideas, negative for hyperactivity, depressed mood and stress. The patient is not nervous/anxious.        Objective   Physical Exam   Constitutional: She is oriented to person, place, and time. She appears well-developed and well-nourished.   HENT:   Head: Normocephalic and atraumatic.   Right Ear: External ear normal.   Left Ear: External ear normal.   Eyes: Conjunctivae and EOM are normal. Right eye exhibits no discharge. Left eye exhibits no discharge. No scleral icterus.   Neck: Normal range of motion. Neck supple. Carotid bruit is not present. No thyromegaly present.   Cardiovascular: Regular rhythm and normal heart sounds.   No murmur heard.  Pulmonary/Chest: Effort normal and breath sounds normal. No respiratory distress. Right breast exhibits no inverted nipple, no mass, no nipple discharge, no skin change and no tenderness. Left breast exhibits no inverted nipple, no mass, no nipple discharge, no skin change and no tenderness. Breasts are symmetrical. There is no breast swelling.   Abdominal: Soft. Bowel sounds are normal. She exhibits no distension and no mass. There is no tenderness. There is no guarding. No hernia. Hernia confirmed negative in the right inguinal area and confirmed negative in the left inguinal area.   Genitourinary: Vagina normal. Rectal exam shows no mass. No breast tenderness, discharge or bleeding. There is no rash, tenderness, lesion or injury on the right labia. There is no rash, tenderness, lesion or injury on  the left labia. No erythema or bleeding in the vagina. No signs of injury around the vagina. No vaginal discharge found.   Genitourinary Comments: Cervix, Uterus and Adnexa are surgically absent.  Urethra and urethral meatus normal  Bladder, normal no prolapse  Perineum and anus examined and without lesions   Musculoskeletal: Normal range of motion. She exhibits no edema or tenderness.   Lymphadenopathy:        Head (right side): No submental, no submandibular, no tonsillar, no preauricular, no posterior auricular and no occipital adenopathy present.        Head (left side): No submental, no submandibular, no tonsillar, no preauricular, no posterior auricular and no occipital adenopathy present.     She has no cervical adenopathy.        Right cervical: No superficial cervical, no deep cervical and no posterior cervical adenopathy present.       Left cervical: No superficial cervical, no deep cervical and no posterior cervical adenopathy present.     She has no axillary adenopathy.        Right: No inguinal adenopathy present.        Left: No inguinal adenopathy present.   Neurological: She is alert and oriented to person, place, and time. She exhibits normal muscle tone. Coordination normal.   Skin: Skin is warm and dry. No bruising and no rash noted. No erythema.   Psychiatric: She has a normal mood and affect. Her behavior is normal. Judgment and thought content normal.   Nursing note and vitals reviewed.      Assessment/Plan   [place] was seen today for annual exam.    Diagnoses and all orders for this visit:    Well woman exam with routine gynecological exam  Normal GYN exam. Will have lab work at PCP. Encouraged SBE, pt is aware how to do self breast exam and the importance of same. Discussed weight management and importance of maintaining a healthy weight. Discussed Vitamin D intake and the importance of adequate vitamin D for both Bone Health and a healthy immune system.  Discussed Daily exercise and the  importance of same, in regards to a healthy heart as well as helping to maintain her weight and improving her mental health.  BMI 15.3.  Patient continues to decline colonoscopy.  He does agree to have Cologuard.  I have sent an order for same.  Mammogram and bone density will be scheduled at [facility].  Pap smear is not done per [facility] guidelines.  -     Cologuard - Stool, Per Rectum; Future    Encounter for screening mammogram for breast cancer  Comments:  Have mammogram at [facility].  Orders:  -     Mammo Screening Digital Tomosynthesis Bilateral With CAD; Future    Encounter for screening for osteoporosis  Comments:  Patient will have bone density at [facility].  Orders:  -     DEXA Bone Density Axial; Future    Bruit  Comments:  Patient will have carotid studies at [facility].  Orders:  -     US Carotid Bilateral; Future    Family history of breast cancer  Pt has a family history of breast cancer. We discussed Genetic screening that can be done to see if she carries the Gene for Breast, Ovarian, Colon, Melanoma, Uterine and Pancreatic Cancers. We discussed if positive she will have free Genetic counseling through [facility]. We also discussed if she does have the positive gene she can have more testing yearly, and even surgery if desired.    Chronic maxillary sinusitis  Comments:  Patient requests a Z-Rica and Diflucan to take as needed if she gets a sinus infection.  She states she usually gets one in the fall when the seasons change.  Orders:  -     azithromycin (ZITHROMAX Z-RICA) 250 MG tablet; Take 2 tablets the first day, then 1 tablet daily for 4 days.  -     fluconazole (DIFLUCAN) 150 MG tablet; Take 1 tablet by mouth 1 (One) Time for 1 dose. Take once a week for 2 weeks.         Patient's Body mass index is 15.34 kg/m². BMI is below normal parameters. Recommendations include: none (medical contraindication).      Ju Hawkins, APRN  9/26/2019

## 2019-09-26 NOTE — PATIENT INSTRUCTIONS

## 2019-10-01 ENCOUNTER — RESULTS ENCOUNTER (OUTPATIENT)
Dept: OBSTETRICS AND GYNECOLOGY | Facility: CLINIC | Age: 64
End: 2019-10-01

## 2019-10-01 DIAGNOSIS — Z01.419 WELL WOMAN EXAM WITH ROUTINE GYNECOLOGICAL EXAM: ICD-10-CM

## 2019-10-08 ENCOUNTER — HOSPITAL ENCOUNTER (OUTPATIENT)
Dept: ULTRASOUND IMAGING | Facility: HOSPITAL | Age: 64
Discharge: HOME | End: 2019-10-08

## 2019-10-08 ENCOUNTER — APPOINTMENT (OUTPATIENT)
Dept: ULTRASOUND IMAGING | Facility: HOSPITAL | Age: 64
End: 2019-10-08

## 2019-10-08 ENCOUNTER — APPOINTMENT (OUTPATIENT)
Dept: MAMMOGRAPHY | Facility: HOSPITAL | Age: 64
End: 2019-10-08

## 2019-10-08 ENCOUNTER — HOSPITAL ENCOUNTER (OUTPATIENT)
Dept: MAMMOGRAPHY | Facility: HOSPITAL | Age: 64
Discharge: HOME | End: 2019-10-08
Admitting: NURSE PRACTITIONER

## 2019-10-08 DIAGNOSIS — Z12.31 ENCOUNTER FOR SCREENING MAMMOGRAM FOR BREAST CANCER: ICD-10-CM

## 2019-10-08 DIAGNOSIS — R09.89 BRUIT: ICD-10-CM

## 2019-10-08 PROCEDURE — 93880 EXTRACRANIAL BILAT STUDY: CPT | Performed by: SURGERY

## 2019-10-08 PROCEDURE — 93880 EXTRACRANIAL BILAT STUDY: CPT

## 2019-10-08 PROCEDURE — 77067 SCR MAMMO BI INCL CAD: CPT

## 2019-10-08 PROCEDURE — 77063 BREAST TOMOSYNTHESIS BI: CPT

## 2021-08-24 NOTE — TELEPHONE ENCOUNTER
Done. JESUSITA
Pt is requesting medication for yeast infection to [facility]  
Pt notified  
no...

## 2021-12-15 ENCOUNTER — OFFICE VISIT (OUTPATIENT)
Dept: OBSTETRICS AND GYNECOLOGY | Facility: CLINIC | Age: 66
End: 2021-12-15

## 2021-12-15 VITALS
DIASTOLIC BLOOD PRESSURE: 74 MMHG | HEIGHT: 71 IN | BODY MASS INDEX: 15.96 KG/M2 | WEIGHT: 114 LBS | SYSTOLIC BLOOD PRESSURE: 112 MMHG

## 2021-12-15 DIAGNOSIS — Z78.0 POST-MENOPAUSAL: ICD-10-CM

## 2021-12-15 DIAGNOSIS — Z78.9 NON-SMOKER: ICD-10-CM

## 2021-12-15 DIAGNOSIS — Z01.419 ENCOUNTER FOR GYNECOLOGICAL EXAMINATION WITHOUT ABNORMAL FINDING: Primary | ICD-10-CM

## 2021-12-15 DIAGNOSIS — Z13.820 OSTEOPOROSIS SCREENING: ICD-10-CM

## 2021-12-15 DIAGNOSIS — Z12.31 ENCOUNTER FOR SCREENING MAMMOGRAM FOR MALIGNANT NEOPLASM OF BREAST: ICD-10-CM

## 2021-12-15 PROCEDURE — G0101 CA SCREEN;PELVIC/BREAST EXAM: HCPCS | Performed by: NURSE PRACTITIONER

## 2021-12-15 RX ORDER — PSEUDOEPHEDRINE HCL 30 MG/1
TABLET, FILM COATED ORAL
COMMUNITY
Start: 2021-11-20 | End: 2023-04-03

## 2021-12-15 NOTE — PATIENT INSTRUCTIONS
"BMI for Adults  What is BMI?  Body mass index (BMI) is a number that is calculated from a person's weight and height. BMI can help estimate how much of a person's weight is composed of fat. BMI does not measure body fat directly. Rather, it is an alternative to procedures that directly measure body fat, which can be difficult and expensive.  BMI can help identify people who may be at higher risk for certain medical problems.  What are BMI measurements used for?  BMI is used as a screening tool to identify possible weight problems. It helps determine whether a person is obese, overweight, a healthy weight, or underweight.  BMI is useful for:  · Identifying a weight problem that may be related to a medical condition or may increase the risk for medical problems.  · Promoting changes, such as changes in diet and exercise, to help reach a healthy weight. BMI screening can be repeated to see if these changes are working.  How is BMI calculated?  BMI involves measuring your weight in relation to your height. Both height and weight are measured, and the BMI is calculated from those numbers. This can be done either in English ([place] or metric measurements. Note that charts and online BMI calculators are available to help you find your BMI quickly and easily without having to do these calculations yourself.  To calculate your BMI in English ([place] measurements:    1. Measure your weight in pounds (lb).  2. Multiply the number of pounds by 703.  ? For example, for a person who weighs 180 lb, multiply that number by 703, which equals 126,540.  3. Measure your height in inches. Then multiply that number by itself to get a measurement called \"inches squared.\"  ? For example, for a person who is 70 inches tall, the \"inches squared\" measurement is 70 inches x 70 inches, which equals 4,900 inches squared.  4. Divide the total from step 2 (number of lb x 703) by the total from step 3 (inches squared): 126,540 ÷ 4,900 = 25.8. This is " "your BMI.    To calculate your BMI in metric measurements:  1. Measure your weight in kilograms (kg).  2. Measure your height in meters (m). Then multiply that number by itself to get a measurement called \"meters squared.\"  ? For example, for a person who is 1.75 m tall, the \"meters squared\" measurement is 1.75 m x 1.75 m, which is equal to 3.1 meters squared.  3. Divide the number of kilograms (your weight) by the meters squared number. In this example: 70 ÷ 3.1 = 22.6. This is your BMI.  What do the results mean?  BMI charts are used to identify whether you are underweight, normal weight, overweight, or obese. The following guidelines will be used:  · Underweight: BMI less than 18.5.  · Normal weight: BMI between 18.5 and 24.9.  · Overweight: BMI between 25 and 29.9.  · Obese: BMI of 30 or above.  Keep these notes in mind:  · Weight includes both fat and muscle, so someone with a muscular build, such as an athlete, may have a BMI that is higher than 24.9. In cases like these, BMI is not an accurate measure of body fat.  · To determine if excess body fat is the cause of a BMI of 25 or higher, further assessments may need to be done by a health care provider.  · BMI is usually interpreted in the same way for men and women.  Where to find more information  For more information about BMI, including tools to quickly calculate your BMI, go to these websites:  · Centers for Disease Control and Prevention: www.cdc.gov  · American Heart Association: www.heart.org  · National Heart, Lung, and Blood [place]: www.nhlbi.nih.gov  Summary  · Body mass index (BMI) is a number that is calculated from a person's weight and height.  · BMI may help estimate how much of a person's weight is composed of fat. BMI can help identify those who may be at higher risk for certain medical problems.  · BMI can be measured using English measurements or metric measurements.  · BMI charts are used to identify whether you are underweight, normal " weight, overweight, or obese.  This information is not intended to replace advice given to you by your health care provider. Make sure you discuss any questions you have with your health care provider.  Document Revised: 09/09/2020 Document Reviewed: 07/17/2020  Elsevier Patient Education © 2021 [employer]

## 2021-12-15 NOTE — PROGRESS NOTES
"Getachew Trivedi is a 66 y.o. female.     Annual exam.       The following portions of the patient's history were reviewed and updated as appropriate: allergies, current medications, past family history, past medical history, past social history, past surgical history and problem list.    /74   Ht 180.3 cm (71\")   Wt 51.7 kg (114 lb)   LMP 06/28/2001 (Exact Date)   BMI 15.90 kg/m²     Review of Systems   Constitutional: Negative for activity change, appetite change, fatigue and fever.   HENT: Negative for congestion, sore throat and trouble swallowing.    Eyes: Negative for pain, discharge and visual disturbance.   Respiratory: Negative for apnea, shortness of breath and wheezing.    Cardiovascular: Negative for chest pain, palpitations and leg swelling.   Gastrointestinal: Negative for abdominal pain, constipation and diarrhea.   Genitourinary: Negative for dyspareunia, dysuria, frequency, pelvic pain, urgency and vaginal discharge.   Musculoskeletal: Negative for back pain and gait problem.   Skin: Negative for color change and rash.   Neurological: Negative for dizziness, weakness and numbness.   Psychiatric/Behavioral: Negative for confusion and sleep disturbance.       Objective   Physical Exam  Vitals and nursing note reviewed.   Constitutional:       Appearance: She is well-developed.   HENT:      Head: Normocephalic and atraumatic.      Right Ear: External ear normal.      Left Ear: External ear normal.   Eyes:      General: No scleral icterus.        Right eye: No discharge.         Left eye: No discharge.      Conjunctiva/sclera: Conjunctivae normal.   Neck:      Thyroid: No thyromegaly.      Vascular: No carotid bruit.   Cardiovascular:      Rate and Rhythm: Regular rhythm.      Heart sounds: Normal heart sounds. No murmur heard.      Pulmonary:      Effort: Pulmonary effort is normal. No respiratory distress.      Breath sounds: Normal breath sounds.   Chest:   Breasts: Breasts are " symmetrical.      Right: No inverted nipple, mass, nipple discharge, skin change or tenderness.      Left: No inverted nipple, mass, nipple discharge, skin change or tenderness.       Abdominal:      General: Bowel sounds are normal. There is no distension.      Palpations: Abdomen is soft. There is no mass.      Tenderness: There is no abdominal tenderness. There is no guarding.      Hernia: No hernia is present. There is no hernia in the left inguinal area.   Genitourinary:     Labia:         Right: No rash, tenderness, lesion or injury.         Left: No rash, tenderness, lesion or injury.       Vagina: Normal. No signs of injury. No vaginal discharge, erythema or bleeding.      Rectum: No mass.      Comments: Cervix, Uterus and Adnexa are surgically absent.  Urethra and urethral meatus normal  Bladder, normal no prolapse  Perineum and anus examined and without lesions  Musculoskeletal:         General: No tenderness. Normal range of motion.      Cervical back: Normal range of motion and neck supple.   Lymphadenopathy:      Head:      Right side of head: No submental, submandibular, tonsillar, preauricular, posterior auricular or occipital adenopathy.      Left side of head: No submental, submandibular, tonsillar, preauricular, posterior auricular or occipital adenopathy.      Cervical: No cervical adenopathy.      Right cervical: No superficial, deep or posterior cervical adenopathy.     Left cervical: No superficial, deep or posterior cervical adenopathy.   Skin:     General: Skin is warm and dry.      Findings: No bruising, erythema or rash.   Neurological:      Mental Status: She is alert and oriented to person, place, and time.      Motor: No abnormal muscle tone.      Coordination: Coordination normal.   Psychiatric:         Behavior: Behavior normal.         Thought Content: Thought content normal.         Judgment: Judgment normal.         Assessment/Plan   Well woman GYN exam.   Pap smear not indiated per  ASCCP guidelines.   Will have lab work at PCP.     Encouraged SBE, pt is aware how to do self breast exam and the importance of same.   Discussed weight management and importance of maintaining a healthy weight.   Discussed Vitamin D intake and the importance of adequate vitamin D for both Bone Health and a healthy immune system.    Discussed Daily exercise and the importance of same, in regards to a healthy heart as well as helping to maintain her weight and improving her mental health.     Colonoscopy pt adamantly declines referral for colonoscopy.   Pt declines cologuard.     Bone density ordered.     Discussed STD prevention and testing.   Pt declines STD testing.     Mammogram will be scheduled at ordered.     Pt requests zpack and diflucan.   Pt advised to call with any questions or concerns.   Discussed S&S to report. Pt voiced understanding.     Patient's Body mass index is 15.9 kg/m². indicating that she is underweight (BMI < 18.5). Recommendations include: educational material.    RV annual exam/prn.   Diagnoses and all orders for this visit:    1. Encounter for gynecological examination without abnormal finding (Primary)    2. Non-smoker    3. Encounter for screening mammogram for malignant neoplasm of breast  -     Mammo Screening Digital Tomosynthesis Bilateral With CAD; Future    4. Osteoporosis screening  -     DEXA Bone Density Axial; Future    5. Post-menopausal  -     DEXA Bone Density Axial; Future    Other orders  -     azithromycin (Zithromax Z-Pardeep) 250 MG tablet; Take 2 tablets (500 mg) on  Day 1,  followed by 1 tablet (250 mg) once daily on Days 2 through 5.  Dispense: 5 tablet; Refill: 0  -     fluconazole (Diflucan) 150 MG tablet; Take 1 tablet by mouth Every Other Day for 2 doses.  Dispense: 2 tablet; Refill: 0

## 2021-12-27 ENCOUNTER — TELEPHONE (OUTPATIENT)
Dept: OBSTETRICS AND GYNECOLOGY | Facility: CLINIC | Age: 66
End: 2021-12-27

## 2021-12-27 RX ORDER — AZITHROMYCIN 250 MG/1
TABLET, FILM COATED ORAL
Qty: 5 TABLET | Refills: 0 | Status: SHIPPED | OUTPATIENT
Start: 2021-12-27 | End: 2022-08-17

## 2021-12-27 RX ORDER — FLUCONAZOLE 150 MG/1
150 TABLET ORAL EVERY OTHER DAY
Qty: 2 TABLET | Refills: 0 | Status: SHIPPED | OUTPATIENT
Start: 2021-12-27 | End: 2021-12-30

## 2022-01-11 ENCOUNTER — OFFICE VISIT (OUTPATIENT)
Dept: INTERNAL MEDICINE | Facility: CLINIC | Age: 67
End: 2022-01-11

## 2022-01-11 VITALS
HEART RATE: 76 BPM | SYSTOLIC BLOOD PRESSURE: 108 MMHG | TEMPERATURE: 97.8 F | WEIGHT: 115 LBS | DIASTOLIC BLOOD PRESSURE: 77 MMHG | BODY MASS INDEX: 16.1 KG/M2 | HEIGHT: 71 IN | OXYGEN SATURATION: 99 % | RESPIRATION RATE: 18 BRPM

## 2022-01-11 DIAGNOSIS — H69.82 EUSTACHIAN TUBE DYSFUNCTION, LEFT: Primary | ICD-10-CM

## 2022-01-11 PROBLEM — J30.2 SEASONAL ALLERGIC RHINITIS: Status: ACTIVE | Noted: 2022-01-11

## 2022-01-11 PROCEDURE — 99213 OFFICE O/P EST LOW 20 MIN: CPT | Performed by: NURSE PRACTITIONER

## 2022-01-11 RX ORDER — PREDNISONE 20 MG/1
20 TABLET ORAL DAILY
Qty: 5 TABLET | Refills: 0 | Status: SHIPPED | OUTPATIENT
Start: 2022-01-11 | End: 2022-08-17

## 2022-01-11 RX ORDER — TRIAMCINOLONE ACETONIDE 55 UG/1
2 SPRAY, METERED NASAL DAILY
Qty: 10.8 ML | Refills: 0 | Status: SHIPPED | OUTPATIENT
Start: 2022-01-11 | End: 2023-01-11

## 2022-01-11 NOTE — PROGRESS NOTES
Subjective     Chief Complaint   Patient presents with   • Earache     Left Ear       History of Present Illness  Pt comes in today to establish care here. Followed with Dr. Jarocho Winter in the past. She complains of an earache. Left ear, started 2 weeks ago. Started after being exposed to cinnamon. No congestion. States her sinuses feel clogged. This has happened in the past and states has been taking sudafed and antihistamine. States she thinks it has helped. She likes to keep the azithromycin script for when she needs it. She is not vaccinated against COVID.     Review of Systems   Constitutional: Negative for fatigue and fever.   HENT: Negative for sinus pressure and sinus pain.    Eyes: Positive for visual disturbance ( wears glasses).   Respiratory: Negative for cough and shortness of breath.    Cardiovascular: Negative for chest pain and palpitations.   Gastrointestinal: Negative for constipation, diarrhea and vomiting.   Endocrine: Negative for polydipsia, polyphagia and polyuria.   Genitourinary: Negative for dysuria and urgency.   Musculoskeletal: Negative for back pain, gait problem and joint swelling.   Allergic/Immunologic: Negative for immunocompromised state.   Neurological: Negative for seizures and syncope.   Hematological: Does not bruise/bleed easily.   Psychiatric/Behavioral: Negative for decreased concentration, dysphoric mood and hallucinations.         Past Medical History:   Past Medical History:   Diagnosis Date   • Disease of thyroid gland      Past Surgical History:  Past Surgical History:   Procedure Laterality Date   • HYSTERECTOMY      with BSO   • TONSILLECTOMY       Social History:  reports that she has never smoked. She has never used smokeless tobacco. She reports current alcohol use. She reports that she does not use drugs.    Family History: family history includes Breast cancer (age of onset: 80) in her maternal grandmother; Lung cancer in her father; No Known Problems in  "her mother and sister; Prostate cancer (age of onset: 70) in her maternal grandfather; Prostate cancer (age of onset: 80) in her brother.      Allergies:  Allergies   Allergen Reactions   • Cinnamon Anaphylaxis   • Penicillins Shortness Of Breath   • Peanut-Containing Drug Products      Medications:  Prior to Admission medications    Medication Sig Start Date End Date Taking? Authorizing Provider   b complex vitamins capsule Take  by mouth.   Yes Abbi De Jesus MD   calcium carbonate (OS-SHAISTA) 600 MG tablet Take  by mouth.   Yes Abbi De Jesus MD   Cholecalciferol (VITAMIN D3) 2000 units capsule Take  by mouth.   Yes Abbi De Jesus MD   MAGNESIUM PO Take 200 mg by mouth.   Yes Abbi De Jesus MD   potassium gluconate 595 (99 K) MG tablet tablet Take  by mouth.   Yes Abbi De Jesus MD   SudoGest Maximum Strength 30 MG tablet  11/20/21  Yes Abbi De Jesus MD   THYROID PO Take  by mouth.   Yes Abbi De Jesus MD   Zinc 50 MG tablet Take  by mouth.   Yes Abbi De Jesus MD   azithromycin (Zithromax Z-Pardeep) 250 MG tablet Take 2 tablets (500 mg) on  Day 1,  followed by 1 tablet (250 mg) once daily on Days 2 through 5. 12/27/21   Minal Winter APRN       Objective     Vital Signs: /77 (BP Location: Right arm, Patient Position: Sitting, Cuff Size: Adult)   Pulse 76   Temp 97.8 °F (36.6 °C)   Resp 18   Ht 180.3 cm (71\")   Wt 52.2 kg (115 lb)   LMP 06/28/2001 (Exact Date)   SpO2 99%   BMI 16.04 kg/m²   Physical Exam  Vitals reviewed.   Constitutional:       Appearance: She is well-developed.   HENT:      Head: Normocephalic and atraumatic.      Left Ear: Tympanic membrane is retracted.      Mouth/Throat:      Mouth: Mucous membranes are moist.   Eyes:      Pupils: Pupils are equal, round, and reactive to light.   Neck:      Vascular: No JVD.   Cardiovascular:      Rate and Rhythm: Normal rate and regular rhythm.   Pulmonary:      Effort: Pulmonary effort " is normal.      Breath sounds: Normal breath sounds.   Abdominal:      General: Bowel sounds are normal.      Palpations: Abdomen is soft.   Musculoskeletal:         General: No deformity.      Cervical back: Normal range of motion and neck supple.   Lymphadenopathy:      Cervical: No cervical adenopathy.   Skin:     General: Skin is warm and dry.   Neurological:      General: No focal deficit present.      Mental Status: She is alert and oriented to person, place, and time.   Psychiatric:         Behavior: Behavior normal.         Thought Content: Thought content normal.         Judgment: Judgment normal.         Patient's Body mass index is 16.04 kg/m². indicating that she is within normal range (BMI 18.5-24.9). No BMI management plan needed..      Results Reviewed:  Glucose   Date Value Ref Range Status   12/05/2017 86 70 - 100 mg/dL Final     BUN   Date Value Ref Range Status   12/05/2017 14 5 - 21 mg/dL Final     Creatinine   Date Value Ref Range Status   12/05/2017 0.77 0.50 - 1.40 mg/dL Final     Sodium   Date Value Ref Range Status   12/05/2017 142 135 - 145 mmol/L Final     Potassium   Date Value Ref Range Status   12/05/2017 4.1 3.5 - 5.3 mmol/L Final     Chloride   Date Value Ref Range Status   12/05/2017 102 98 - 110 mmol/L Final     Total CO2   Date Value Ref Range Status   12/05/2017 29.0 24.0 - 31.0 mmol/L Final     Calcium   Date Value Ref Range Status   12/05/2017 10.1 8.4 - 10.4 mg/dL Final     ALT (SGPT)   Date Value Ref Range Status   12/05/2017 42 0 - 54 U/L Final     AST (SGOT)   Date Value Ref Range Status   12/05/2017 37 7 - 45 U/L Final     WBC   Date Value Ref Range Status   12/05/2017 8.19 4.80 - 10.80 10*3/mm3 Final     Hematocrit   Date Value Ref Range Status   12/05/2017 45.5 37.0 - 47.0 % Final     Platelets   Date Value Ref Range Status   12/05/2017 249 130 - 400 10*3/mm3 Final     Triglycerides   Date Value Ref Range Status   12/05/2017 45 0 - 149 mg/dL Final     HDL Cholesterol    Date Value Ref Range Status   12/05/2017 77 >=50 mg/dL Final     LDL Cholesterol    Date Value Ref Range Status   12/05/2017 59 0 - 99 mg/dL Final     LDL/HDL Ratio   Date Value Ref Range Status   12/05/2017 0.77  Final     Hemoglobin A1C   Date Value Ref Range Status   12/05/2017 5.10 % Final     Comment:     Less than 6.0           Non-Diabetic Range  6.0-7.0                 ADA Therapeutic Target  Greater than 7.0        Action Suggested           Assessment / Plan     Assessment/Plan:  Diagnoses and all orders for this visit:    1. Eustachian tube dysfunction, left (Primary)  -     predniSONE (DELTASONE) 20 MG tablet; Take 1 tablet by mouth Daily.  Dispense: 5 tablet; Refill: 0  -     Triamcinolone Acetonide (NASACORT) 55 MCG/ACT nasal inhaler; 2 sprays into the nostril(s) as directed by provider Daily.  Dispense: 10.8 mL; Refill: 0        Return in about 3 months (around 4/11/2022) for Medicare Wellness. unless patient needs to be seen sooner or acute issues arise.    Code Status: Full.     I have discussed the patient results/orders and and plan/recommendation with them at today's visit.      Vanessa Regalado, APRN   01/11/2022

## 2022-01-19 ENCOUNTER — APPOINTMENT (OUTPATIENT)
Dept: MAMMOGRAPHY | Facility: HOSPITAL | Age: 67
End: 2022-01-19

## 2022-01-19 ENCOUNTER — APPOINTMENT (OUTPATIENT)
Dept: BONE DENSITY | Facility: HOSPITAL | Age: 67
End: 2022-01-19

## 2022-01-25 ENCOUNTER — HOSPITAL ENCOUNTER (OUTPATIENT)
Dept: BONE DENSITY | Facility: HOSPITAL | Age: 67
Discharge: HOME | End: 2022-01-25

## 2022-01-25 ENCOUNTER — HOSPITAL ENCOUNTER (OUTPATIENT)
Dept: MAMMOGRAPHY | Facility: HOSPITAL | Age: 67
Discharge: HOME | End: 2022-01-25

## 2022-01-25 DIAGNOSIS — Z78.0 POST-MENOPAUSAL: ICD-10-CM

## 2022-01-25 DIAGNOSIS — Z12.31 ENCOUNTER FOR SCREENING MAMMOGRAM FOR MALIGNANT NEOPLASM OF BREAST: ICD-10-CM

## 2022-01-25 DIAGNOSIS — Z13.820 OSTEOPOROSIS SCREENING: ICD-10-CM

## 2022-01-25 PROCEDURE — 77080 DXA BONE DENSITY AXIAL: CPT

## 2022-01-25 PROCEDURE — 77067 SCR MAMMO BI INCL CAD: CPT

## 2022-01-25 PROCEDURE — 77063 BREAST TOMOSYNTHESIS BI: CPT

## 2022-05-03 ENCOUNTER — TRANSCRIBE ORDERS (OUTPATIENT)
Dept: ADMINISTRATIVE | Facility: HOSPITAL | Age: 67
End: 2022-05-03

## 2022-05-03 DIAGNOSIS — Z12.31 ENCOUNTER FOR SCREENING MAMMOGRAM FOR MALIGNANT NEOPLASM OF BREAST: Primary | ICD-10-CM

## 2022-08-17 ENCOUNTER — OFFICE VISIT (OUTPATIENT)
Dept: INTERNAL MEDICINE | Facility: CLINIC | Age: 67
End: 2022-08-17

## 2022-08-17 VITALS
RESPIRATION RATE: 18 BRPM | HEART RATE: 62 BPM | WEIGHT: 113.4 LBS | DIASTOLIC BLOOD PRESSURE: 67 MMHG | HEIGHT: 71 IN | SYSTOLIC BLOOD PRESSURE: 114 MMHG | TEMPERATURE: 96.8 F | OXYGEN SATURATION: 99 % | BODY MASS INDEX: 15.88 KG/M2

## 2022-08-17 DIAGNOSIS — S99.912A INJURY OF LEFT ANKLE, INITIAL ENCOUNTER: Primary | ICD-10-CM

## 2022-08-17 PROCEDURE — 99213 OFFICE O/P EST LOW 20 MIN: CPT

## 2022-08-17 NOTE — PROGRESS NOTES
Subjective     Chief Complaint   Patient presents with   • Joint Swelling     Left ankle. Hit in ankle with shopping cart one week ago.        History of Present Illness  Patient presents today with complaints of left ankle pain. States she was at work a little over a week ago and someone ran a grocery cart on accident into left ankle. States initially did have some swelling and pain to left lateral ankle. state the pain and swelling is gone, but she just wants to make sure everything is ok. Denies any difficulty walking. Has had stress fracture on top of left foot in the past.     Patient's PMR from outside medical facility reviewed and noted.    Review of Systems   Constitutional: Negative for activity change, fatigue and unexpected weight change.   HENT: Negative for mouth sores and trouble swallowing.    Eyes: Negative for discharge and visual disturbance.   Respiratory: Negative for cough and shortness of breath.    Cardiovascular: Negative for chest pain and leg swelling.   Gastrointestinal: Negative for abdominal pain, constipation, diarrhea and nausea.   Genitourinary: Negative for decreased urine volume, difficulty urinating and hematuria.   Musculoskeletal: Negative for back pain and gait problem.   Skin: Negative for color change and rash.   Allergic/Immunologic: Negative for environmental allergies and immunocompromised state.   Neurological: Negative for weakness and headaches.   Psychiatric/Behavioral: Negative for confusion and sleep disturbance.        Otherwise complete ROS reviewed and negative except as mentioned in the HPI.    Past Medical History:   Past Medical History:   Diagnosis Date   • Disease of thyroid gland      Past Surgical History:  Past Surgical History:   Procedure Laterality Date   • HYSTERECTOMY      with BSO   • TONSILLECTOMY       Social History:  reports that she has never smoked. She has never used smokeless tobacco. She reports current alcohol use. She reports that  she does not use drugs.    Family History: family history includes Breast cancer (age of onset: 80) in her maternal grandmother; Lung cancer in her father; No Known Problems in her mother and sister; Prostate cancer (age of onset: 70) in her maternal grandfather; Prostate cancer (age of onset: 80) in her brother.      Allergies:  Allergies   Allergen Reactions   • Cinnamon Anaphylaxis   • Penicillins Shortness Of Breath   • Peanut-Containing Drug Products      Medications:  Prior to Admission medications    Medication Sig Start Date End Date Taking? Authorizing Provider   b complex vitamins capsule Take  by mouth.   Yes Abbi De Jesus MD   calcium carbonate (OS-SHAISTA) 600 MG tablet Take  by mouth.   Yes Abbi De Jesus MD   Cholecalciferol (VITAMIN D3) 2000 units capsule Take  by mouth.   Yes Abbi De Jesus MD   MAGNESIUM PO Take 200 mg by mouth.   Yes Abbi De Jesus MD   potassium gluconate 595 (99 K) MG tablet tablet Take  by mouth.   Yes Abbi De Jesus MD   SudoGest Maximum Strength 30 MG tablet  11/20/21  Yes Abbi De Jesus MD   THYROID PO Take  by mouth.   Yes Abbi De Jesus MD   Triamcinolone Acetonide (NASACORT) 55 MCG/ACT nasal inhaler 2 sprays into the nostril(s) as directed by provider Daily. 1/11/22 1/11/23 Yes Vanessa Regalado APRN   Zinc 50 MG tablet Take  by mouth.   Yes Abbi De Jesus MD   azithromycin (Zithromax Z-Pardeep) 250 MG tablet Take 2 tablets (500 mg) on  Day 1,  followed by 1 tablet (250 mg) once daily on Days 2 through 5. 12/27/21   Minal Winter APRN   predniSONE (DELTASONE) 20 MG tablet Take 1 tablet by mouth Daily. 1/11/22   Vanessa Regalado APRN       ANABELL:        PHQ-9 Depression Screening  Little interest or pleasure in doing things? 0-->not at all   Feeling down, depressed, or hopeless? 0-->not at all   Trouble falling or staying asleep, or sleeping too much?     Feeling tired or having little energy?     Poor  "appetite or overeating?     Feeling bad about yourself - or that you are a failure or have let yourself or your family down?     Trouble concentrating on things, such as reading the newspaper or watching television?     Moving or speaking so slowly that other people could have noticed? Or the opposite - being so fidgety or restless that you have been moving around a lot more than usual?     Thoughts that you would be better off dead, or of hurting yourself in some way?     PHQ-9 Total Score 0   If you checked off any problems, how difficult have these problems made it for you to do your work, take care of things at home, or get along with other people?         PHQ-9 Total Score: 0       Objective     Vital Signs: /67 (BP Location: Right arm, Patient Position: Sitting, Cuff Size: Adult)   Pulse 62   Temp 96.8 °F (36 °C) (Skin)   Resp 18   Ht 180.3 cm (71\")   Wt 51.4 kg (113 lb 6.4 oz)   LMP 06/28/2001 (Exact Date)   SpO2 99%   BMI 15.82 kg/m²   Physical Exam  Constitutional:       General: She is not in acute distress.     Appearance: Normal appearance. She is normal weight. She is not ill-appearing.   HENT:      Head: Normocephalic and atraumatic.      Right Ear: External ear normal.      Left Ear: External ear normal.      Nose: Nose normal.      Mouth/Throat:      Mouth: Mucous membranes are moist.      Pharynx: No posterior oropharyngeal erythema.   Eyes:      General: No scleral icterus.     Extraocular Movements: Extraocular movements intact.      Conjunctiva/sclera: Conjunctivae normal.      Pupils: Pupils are equal, round, and reactive to light.   Cardiovascular:      Rate and Rhythm: Normal rate and regular rhythm.      Pulses: Normal pulses.      Heart sounds: Normal heart sounds.   Pulmonary:      Effort: Pulmonary effort is normal. No respiratory distress.      Breath sounds: Normal breath sounds. No wheezing.   Abdominal:      General: Abdomen is flat. Bowel sounds are normal.      " Palpations: Abdomen is soft.      Tenderness: There is no abdominal tenderness.   Musculoskeletal:         General: Signs of injury present. No swelling or tenderness. Normal range of motion.      Cervical back: Normal range of motion.      Right lower leg: No edema.      Left lower leg: No edema.      Comments: Denies any pain with palpation of left lateral ankle. No edema or bruising noted.    Skin:     General: Skin is warm and dry.      Findings: No erythema or rash.   Neurological:      General: No focal deficit present.      Mental Status: She is alert and oriented to person, place, and time. Mental status is at baseline.      Motor: No weakness.   Psychiatric:         Mood and Affect: Mood normal.         Behavior: Behavior normal.         Thought Content: Thought content normal.         Judgment: Judgment normal.           Results Reviewed:  Glucose   Date Value Ref Range Status   12/05/2017 86 70 - 100 mg/dL Final     BUN   Date Value Ref Range Status   12/05/2017 14 5 - 21 mg/dL Final     Creatinine   Date Value Ref Range Status   12/05/2017 0.77 0.50 - 1.40 mg/dL Final     Sodium   Date Value Ref Range Status   12/05/2017 142 135 - 145 mmol/L Final     Potassium   Date Value Ref Range Status   12/05/2017 4.1 3.5 - 5.3 mmol/L Final     Chloride   Date Value Ref Range Status   12/05/2017 102 98 - 110 mmol/L Final     Total CO2   Date Value Ref Range Status   12/05/2017 29.0 24.0 - 31.0 mmol/L Final     Calcium   Date Value Ref Range Status   12/05/2017 10.1 8.4 - 10.4 mg/dL Final     ALT (SGPT)   Date Value Ref Range Status   12/05/2017 42 0 - 54 U/L Final     AST (SGOT)   Date Value Ref Range Status   12/05/2017 37 7 - 45 U/L Final     WBC   Date Value Ref Range Status   12/05/2017 8.19 4.80 - 10.80 10*3/mm3 Final     Hematocrit   Date Value Ref Range Status   12/05/2017 45.5 37.0 - 47.0 % Final     Platelets   Date Value Ref Range Status   12/05/2017 249 130 - 400 10*3/mm3 Final     Triglycerides   Date  Value Ref Range Status   12/05/2017 45 0 - 149 mg/dL Final     HDL Cholesterol   Date Value Ref Range Status   12/05/2017 77 >=50 mg/dL Final     LDL Cholesterol    Date Value Ref Range Status   12/05/2017 59 0 - 99 mg/dL Final     LDL/HDL Ratio   Date Value Ref Range Status   12/05/2017 0.77  Final     Hemoglobin A1C   Date Value Ref Range Status   12/05/2017 5.10 % Final     Comment:     Less than 6.0           Non-Diabetic Range  6.0-7.0                 ADA Therapeutic Target  Greater than 7.0        Action Suggested           Assessment / Plan     Assessment/Plan:  1. Injury of left ankle, initial encounter  - XR Ankle 3+ View Left (In Office)        Return for Medicare Wellness with PCP. unless patient needs to be seen sooner or acute issues arise.      I have discussed the patient results/orders and and plan/recommendation with them at today's visit.      Penny Mcgill, APRN   08/17/2022

## 2022-08-18 ENCOUNTER — TELEPHONE (OUTPATIENT)
Dept: INTERNAL MEDICINE | Facility: CLINIC | Age: 67
End: 2022-08-18

## 2022-08-18 NOTE — TELEPHONE ENCOUNTER
----- Message from RASHI Dillard sent at 8/18/2022 11:00 AM CDT -----  Xray shows medial talar done possible osteochondral lesion. This is probably related to an injury to the cartilage around the area at the top of the anterior portion of the ankle. If it causes her pain I would like to send her to podiatry for second   opinion. Could be related to the recent injury, but not certain because the location appears to be more on the top of anterior ankle and not on the outside of the ankle, however if she turned her ankle when she was hit with the shopping cart it is po  ssible. See what she would like to do.

## 2022-08-18 NOTE — TELEPHONE ENCOUNTER
Called and spoke to patient regarding xray results. She voiced understanding and states that she isn't in any pain and would like to continue to monitor herself and will contact us if she pain worsens to then see podiatry. She had no further questions at this time.

## 2022-08-18 NOTE — PROGRESS NOTES
Xray shows medial talar done possible osteochondral lesion. This is probably related to an injury to the cartilage around the area at the top of the anterior portion of the ankle. If it causes her pain I would like to send her to podiatry for second opinion. Could be related to the recent injury, but not certain because the location appears to be more on the top of anterior ankle and not on the outside of the ankle, however if she turned her ankle when she was hit with the shopping cart it is possible. See what she would like to do.

## 2022-08-23 ENCOUNTER — OFFICE VISIT (OUTPATIENT)
Dept: INTERNAL MEDICINE | Facility: CLINIC | Age: 67
End: 2022-08-23

## 2022-08-23 VITALS
TEMPERATURE: 98.9 F | HEART RATE: 78 BPM | SYSTOLIC BLOOD PRESSURE: 109 MMHG | WEIGHT: 114.3 LBS | HEIGHT: 71 IN | OXYGEN SATURATION: 98 % | RESPIRATION RATE: 18 BRPM | BODY MASS INDEX: 16 KG/M2 | DIASTOLIC BLOOD PRESSURE: 59 MMHG

## 2022-08-23 DIAGNOSIS — S81.802A LEG WOUND, LEFT, INITIAL ENCOUNTER: Primary | ICD-10-CM

## 2022-08-23 PROCEDURE — 90471 IMMUNIZATION ADMIN: CPT

## 2022-08-23 PROCEDURE — 99213 OFFICE O/P EST LOW 20 MIN: CPT

## 2022-08-23 PROCEDURE — 90715 TDAP VACCINE 7 YRS/> IM: CPT

## 2022-08-23 RX ORDER — DOXYCYCLINE HYCLATE 100 MG/1
100 CAPSULE ORAL 2 TIMES DAILY
Qty: 14 CAPSULE | Refills: 0 | Status: SHIPPED | OUTPATIENT
Start: 2022-08-23 | End: 2022-08-30

## 2022-08-23 NOTE — PROGRESS NOTES
Subjective     Chief Complaint   Patient presents with   • Wound Check     On left shin.        History of Present Illness  Patient presents today for wound check. States yesterday she was working in her flower garden and cut a small spot on her left lower leg on a metal political sign. initially tried cleaning with vinegar and water and cleaning with antibacterial soap. States is looking slightly red around the boarders. Has been trying to keep covered. Unsure when last tetanus shot was.   Patient's PMR from outside medical facility reviewed and noted.    Review of Systems   Constitutional: Negative for activity change, fatigue and unexpected weight change.   HENT: Negative for congestion, mouth sores and trouble swallowing.    Eyes: Negative for discharge and visual disturbance.   Respiratory: Negative for cough and shortness of breath.    Cardiovascular: Negative for chest pain and leg swelling.   Gastrointestinal: Negative for abdominal pain, constipation, diarrhea and nausea.   Genitourinary: Negative for decreased urine volume, difficulty urinating and hematuria.   Musculoskeletal: Negative for back pain and gait problem.   Skin: Positive for wound. Negative for color change and rash.   Allergic/Immunologic: Negative for environmental allergies and immunocompromised state.   Neurological: Negative for weakness and headaches.   Psychiatric/Behavioral: Negative for confusion and sleep disturbance.        Otherwise complete ROS reviewed and negative except as mentioned in the HPI.    Past Medical History:   Past Medical History:   Diagnosis Date   • Disease of thyroid gland      Past Surgical History:  Past Surgical History:   Procedure Laterality Date   • HYSTERECTOMY      with BSO   • TONSILLECTOMY       Social History:  reports that she has never smoked. She has never used smokeless tobacco. She reports current alcohol use. She reports that she does not use drugs.    Family History: family history  "includes Breast cancer (age of onset: 80) in her maternal grandmother; Lung cancer in her father; No Known Problems in her mother and sister; Prostate cancer (age of onset: 70) in her maternal grandfather; Prostate cancer (age of onset: 80) in her brother.      Allergies:  Allergies   Allergen Reactions   • Cinnamon Anaphylaxis   • Penicillins Shortness Of Breath   • Peanut-Containing Drug Products      Medications:  Prior to Admission medications    Medication Sig Start Date End Date Taking? Authorizing Provider   b complex vitamins capsule Take  by mouth.   Yes Abbi De Jesus MD   calcium carbonate (OS-SHAISTA) 600 MG tablet Take  by mouth.   Yes Abbi De Jesus MD   Cholecalciferol (VITAMIN D3) 2000 units capsule Take  by mouth.   Yes Abbi De Jesus MD   MAGNESIUM PO Take 200 mg by mouth.   Yes Abbi De Jesus MD   potassium gluconate 595 (99 K) MG tablet tablet Take  by mouth.   Yes Abbi De Jesus MD   SudoGest Maximum Strength 30 MG tablet  11/20/21  Yes Abbi De Jesus MD   THYROID PO Take  by mouth.   Yes Abbi De Jesus MD   Triamcinolone Acetonide (NASACORT) 55 MCG/ACT nasal inhaler 2 sprays into the nostril(s) as directed by provider Daily. 1/11/22 1/11/23 Yes Vanessa Regalado APRN   Zinc 50 MG tablet Take  by mouth.   Yes Abbi De Jesus MD       Objective     Vital Signs: /59 (BP Location: Right arm, Patient Position: Sitting, Cuff Size: Adult)   Pulse 78   Temp 98.9 °F (37.2 °C) (Temporal)   Resp 18   Ht 180.3 cm (71\")   Wt 51.8 kg (114 lb 4.8 oz)   LMP 06/28/2001 (Exact Date)   SpO2 98%   BMI 15.94 kg/m²   Physical Exam  Constitutional:       General: She is not in acute distress.     Appearance: Normal appearance. She is normal weight. She is not ill-appearing.   HENT:      Head: Normocephalic and atraumatic.      Nose: Nose normal.      Mouth/Throat:      Mouth: Mucous membranes are moist.      Pharynx: No posterior oropharyngeal " erythema.   Eyes:      General: No scleral icterus.     Extraocular Movements: Extraocular movements intact.      Conjunctiva/sclera: Conjunctivae normal.      Pupils: Pupils are equal, round, and reactive to light.   Cardiovascular:      Rate and Rhythm: Normal rate and regular rhythm.      Pulses: Normal pulses.      Heart sounds: Normal heart sounds.   Pulmonary:      Effort: Pulmonary effort is normal. No respiratory distress.      Breath sounds: Normal breath sounds. No wheezing.   Abdominal:      General: Abdomen is flat. Bowel sounds are normal.      Palpations: Abdomen is soft.      Tenderness: There is no abdominal tenderness.   Musculoskeletal:         General: Normal range of motion.      Cervical back: Normal range of motion.      Right lower leg: No edema.      Left lower leg: No edema.   Skin:     General: Skin is warm and dry.      Findings: No erythema or rash.      Comments: Pencil eraser size skin tear noted to lower left skin. Cleansed and placed mupirocin ointment on it and covered with Band-Aid.    Neurological:      General: No focal deficit present.      Mental Status: She is alert and oriented to person, place, and time. Mental status is at baseline.      Motor: No weakness.   Psychiatric:         Mood and Affect: Mood normal.         Behavior: Behavior normal.         Thought Content: Thought content normal.         Judgment: Judgment normal.         Results Reviewed:  Glucose   Date Value Ref Range Status   12/05/2017 86 70 - 100 mg/dL Final     BUN   Date Value Ref Range Status   12/05/2017 14 5 - 21 mg/dL Final     Creatinine   Date Value Ref Range Status   12/05/2017 0.77 0.50 - 1.40 mg/dL Final     Sodium   Date Value Ref Range Status   12/05/2017 142 135 - 145 mmol/L Final     Potassium   Date Value Ref Range Status   12/05/2017 4.1 3.5 - 5.3 mmol/L Final     Chloride   Date Value Ref Range Status   12/05/2017 102 98 - 110 mmol/L Final     Total CO2   Date Value Ref Range Status    12/05/2017 29.0 24.0 - 31.0 mmol/L Final     Calcium   Date Value Ref Range Status   12/05/2017 10.1 8.4 - 10.4 mg/dL Final     ALT (SGPT)   Date Value Ref Range Status   12/05/2017 42 0 - 54 U/L Final     AST (SGOT)   Date Value Ref Range Status   12/05/2017 37 7 - 45 U/L Final     WBC   Date Value Ref Range Status   12/05/2017 8.19 4.80 - 10.80 10*3/mm3 Final     Hematocrit   Date Value Ref Range Status   12/05/2017 45.5 37.0 - 47.0 % Final     Platelets   Date Value Ref Range Status   12/05/2017 249 130 - 400 10*3/mm3 Final     Triglycerides   Date Value Ref Range Status   12/05/2017 45 0 - 149 mg/dL Final     HDL Cholesterol   Date Value Ref Range Status   12/05/2017 77 >=50 mg/dL Final     LDL Cholesterol    Date Value Ref Range Status   12/05/2017 59 0 - 99 mg/dL Final     LDL/HDL Ratio   Date Value Ref Range Status   12/05/2017 0.77  Final     Hemoglobin A1C   Date Value Ref Range Status   12/05/2017 5.10 % Final     Comment:     Less than 6.0           Non-Diabetic Range  6.0-7.0                 ADA Therapeutic Target  Greater than 7.0        Action Suggested           Assessment / Plan     Assessment/Plan:  1. Leg wound, left, initial encounter  - Tdap Vaccine Greater Than or Equal To 6yo IM  - doxycycline (VIBRAMYCIN) 100 MG capsule; Take 1 capsule by mouth 2 (Two) Times a Day for 7 days.  Dispense: 14 capsule; Refill: 0  - mupirocin (BACTROBAN) 2 % ointment; Apply 1 application topically to the appropriate area as directed 3 (Three) Times a Day.  Dispense: 22 g; Refill: 0    Dressed with mupirocin ointment and Band-Aid.     Return if symptoms worsen or fail to improve. unless patient needs to be seen sooner or acute issues arise.      I have discussed the patient results/orders and and plan/recommendation with them at today's visit.      Penny Mcgill, APRN   08/23/2022

## 2022-11-09 ENCOUNTER — OFFICE VISIT (OUTPATIENT)
Dept: INTERNAL MEDICINE | Facility: CLINIC | Age: 67
End: 2022-11-09

## 2022-11-09 VITALS — HEIGHT: 71 IN | WEIGHT: 114 LBS | BODY MASS INDEX: 15.96 KG/M2

## 2022-11-09 DIAGNOSIS — J01.10 ACUTE NON-RECURRENT FRONTAL SINUSITIS: Primary | ICD-10-CM

## 2022-11-09 PROCEDURE — 99213 OFFICE O/P EST LOW 20 MIN: CPT

## 2022-11-09 RX ORDER — AZITHROMYCIN 250 MG/1
TABLET, FILM COATED ORAL
Qty: 6 TABLET | Refills: 0 | Status: SHIPPED | OUTPATIENT
Start: 2022-11-09 | End: 2022-12-07
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
## 2022-12-07 ENCOUNTER — OFFICE VISIT (OUTPATIENT)
Dept: FAMILY MEDICINE CLINIC | Facility: CLINIC | Age: 67
End: 2022-12-07

## 2022-12-07 VITALS
DIASTOLIC BLOOD PRESSURE: 84 MMHG | RESPIRATION RATE: 16 BRPM | TEMPERATURE: 100.5 F | WEIGHT: 118 LBS | HEIGHT: 71 IN | OXYGEN SATURATION: 97 % | HEART RATE: 121 BPM | SYSTOLIC BLOOD PRESSURE: 139 MMHG | BODY MASS INDEX: 16.52 KG/M2

## 2022-12-07 DIAGNOSIS — J06.9 UPPER RESPIRATORY TRACT INFECTION, UNSPECIFIED TYPE: Primary | ICD-10-CM

## 2022-12-07 DIAGNOSIS — R50.9 FEVER, UNSPECIFIED FEVER CAUSE: ICD-10-CM

## 2022-12-07 DIAGNOSIS — R09.81 CONGESTION OF NASAL SINUS: ICD-10-CM

## 2022-12-07 DIAGNOSIS — J02.9 SORE THROAT: ICD-10-CM

## 2022-12-07 LAB
EXPIRATION DATE: NORMAL
EXPIRATION DATE: NORMAL
FLUAV AG UPPER RESP QL IA.RAPID: NOT DETECTED
FLUBV AG UPPER RESP QL IA.RAPID: NOT DETECTED
INTERNAL CONTROL: NORMAL
INTERNAL CONTROL: NORMAL
Lab: NORMAL
Lab: NORMAL
S PYO AG THROAT QL: NEGATIVE
SARS-COV-2 AG UPPER RESP QL IA.RAPID: NOT DETECTED

## 2022-12-07 PROCEDURE — 87880 STREP A ASSAY W/OPTIC: CPT | Performed by: NURSE PRACTITIONER

## 2022-12-07 PROCEDURE — 99213 OFFICE O/P EST LOW 20 MIN: CPT | Performed by: NURSE PRACTITIONER

## 2022-12-07 PROCEDURE — 87428 SARSCOV & INF VIR A&B AG IA: CPT | Performed by: NURSE PRACTITIONER

## 2022-12-07 RX ORDER — GUAIFENESIN 600 MG/1
1200 TABLET, EXTENDED RELEASE ORAL 2 TIMES DAILY
Qty: 28 TABLET | Refills: 0 | Status: SHIPPED | OUTPATIENT
Start: 2022-12-07 | End: 2022-12-15 | Stop reason: SDUPTHER

## 2022-12-07 RX ORDER — PREDNISONE 20 MG/1
20 TABLET ORAL DAILY
Qty: 5 TABLET | Refills: 0 | Status: SHIPPED | OUTPATIENT
Start: 2022-12-07 | End: 2022-12-12

## 2022-12-07 RX ORDER — FLUCONAZOLE 150 MG/1
150 TABLET ORAL ONCE
Qty: 1 TABLET | Refills: 0 | Status: SHIPPED | OUTPATIENT
Start: 2022-12-07 | End: 2022-12-07

## 2022-12-07 RX ORDER — AZITHROMYCIN 250 MG/1
TABLET, FILM COATED ORAL
Qty: 6 TABLET | Refills: 0 | Status: SHIPPED | OUTPATIENT
Start: 2022-12-07 | End: 2022-12-15

## 2022-12-07 RX ORDER — FLUCONAZOLE 150 MG/1
TABLET ORAL
Qty: 2 TABLET | Refills: 0 | Status: SHIPPED | OUTPATIENT
Start: 2022-12-07 | End: 2023-02-16 | Stop reason: SDUPTHER
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
## 2022-12-12 ENCOUNTER — TELEPHONE (OUTPATIENT)
Dept: INTERNAL MEDICINE | Facility: CLINIC | Age: 67
End: 2022-12-12

## 2022-12-12 NOTE — TELEPHONE ENCOUNTER
Caller: Lilli Trivedi    Relationship: Self    Best call back number: [phone]    What is the best time to reach you: ANYTIME    Who are you requesting to speak with (clinical staff, provider,  specific staff member): RASHI BULLARD OR NURSE    What was the call regarding: WANTING TO KNOW IF SHE CAN CONTINUE Z-PACK, IS STILL HAVING SYMPTOMS. WANTED TO SPEAK TO RASHI BULLARD REGARDING IT.    Do you require a callback: YES

## 2022-12-12 NOTE — TELEPHONE ENCOUNTER
Patient states she feels better but she wants a refill of the z-pack because she feels like it was helping but she is still coughing and coughing up discolored mucus. Finished prednisone and is still taking mucinex. No more headache, sore throat, fever or any other symptoms.

## 2022-12-15 ENCOUNTER — OFFICE VISIT (OUTPATIENT)
Dept: FAMILY MEDICINE CLINIC | Facility: CLINIC | Age: 67
End: 2022-12-15

## 2022-12-15 ENCOUNTER — TELEPHONE (OUTPATIENT)
Dept: INTERNAL MEDICINE | Facility: CLINIC | Age: 67
End: 2022-12-15

## 2022-12-15 VITALS
RESPIRATION RATE: 16 BRPM | HEIGHT: 71 IN | DIASTOLIC BLOOD PRESSURE: 84 MMHG | OXYGEN SATURATION: 98 % | SYSTOLIC BLOOD PRESSURE: 123 MMHG | HEART RATE: 111 BPM | TEMPERATURE: 97.8 F | BODY MASS INDEX: 15.82 KG/M2 | WEIGHT: 113 LBS

## 2022-12-15 DIAGNOSIS — J40 BRONCHITIS: Primary | ICD-10-CM

## 2022-12-15 DIAGNOSIS — R09.81 CONGESTION OF NASAL SINUS: ICD-10-CM

## 2022-12-15 DIAGNOSIS — J06.9 UPPER RESPIRATORY TRACT INFECTION, UNSPECIFIED TYPE: ICD-10-CM

## 2022-12-15 PROCEDURE — 99213 OFFICE O/P EST LOW 20 MIN: CPT | Performed by: NURSE PRACTITIONER

## 2022-12-15 RX ORDER — DOXYCYCLINE HYCLATE 100 MG/1
100 CAPSULE ORAL 2 TIMES DAILY
Qty: 10 CAPSULE | Refills: 0 | Status: SHIPPED | OUTPATIENT
Start: 2022-12-15 | End: 2022-12-20

## 2022-12-15 RX ORDER — GUAIFENESIN 600 MG/1
1200 TABLET, EXTENDED RELEASE ORAL 2 TIMES DAILY
Qty: 28 TABLET | Refills: 0 | Status: SHIPPED | OUTPATIENT
Start: 2022-12-15 | End: 2022-12-22

## 2022-12-15 RX ORDER — DEXTROMETHORPHAN HYDROBROMIDE AND PROMETHAZINE HYDROCHLORIDE 15; 6.25 MG/5ML; MG/5ML
5 SYRUP ORAL 4 TIMES DAILY PRN
Qty: 180 ML | Refills: 0 | Status: SHIPPED | OUTPATIENT
Start: 2022-12-15 | End: 2023-04-03

## 2022-12-15 RX ORDER — PREDNISONE 20 MG/1
20 TABLET ORAL DAILY
Qty: 7 TABLET | Refills: 0 | Status: SHIPPED | OUTPATIENT
Start: 2022-12-15 | End: 2022-12-22

## 2022-12-15 NOTE — TELEPHONE ENCOUNTER
Caller: FarooqLilli    Relationship: Self    Best call back number: [phone]    Requested Prescriptions:   Requested Prescriptions     Pending Prescriptions Disp Refills   • guaiFENesin (Mucinex) 600 MG 12 hr tablet 28 tablet 0     Sig: Take 2 tablets by mouth 2 (Two) Times a Day for 7 days.        Pharmacy where request should be sent: [facility] - [address] [phone] [facility] [phone] FX     Additional details provided by patient: PATIENT ONLY HAS 1 LEFT.     Does the patient have less than a 3 day supply:  [x] Yes  [] No    Would you like a call back once the refill request has been completed: [x] Yes [] No    If the office needs to give you a call back, can they leave a voicemail: [x] Yes [] No    Lluvia Aguirre Rep   12/15/22 08:20 CST

## 2022-12-15 NOTE — PROGRESS NOTES
Subjective     Chief Complaint   Patient presents with   • Cough     Productive.        History of Present Illness    Patient presents today with worsening cough.  States her cough is productive with yellow drainage.  She is coughing the same amount and now has laryngitis, and her chest feels tight, some shortness of breath.  She is requesting a refill on a z-mary.  She was treated on 12/07 for a URI with z-mary, prednisone, and mucinex.   She was feeling better on the antibiotic but started to feel worse yesterday.     Patient's PMR from outside medical facility reviewed and noted.    Review of Systems   Constitutional: Negative for fever.   HENT: Positive for ear pain. Negative for sore throat.    Respiratory: Positive for cough and shortness of breath.         Otherwise complete ROS reviewed and negative except as mentioned in the HPI.    Past Medical History:   Past Medical History:   Diagnosis Date   • Disease of thyroid gland      Past Surgical History:  Past Surgical History:   Procedure Laterality Date   • HYSTERECTOMY      with BSO   • TONSILLECTOMY       Social History:  reports that she has never smoked. She has never used smokeless tobacco. She reports current alcohol use. She reports that she does not use drugs.    Family History: family history includes Breast cancer (age of onset: 80) in her maternal grandmother; Lung cancer in her father; No Known Problems in her mother and sister; Prostate cancer (age of onset: 70) in her maternal grandfather; Prostate cancer (age of onset: 80) in her brother.      Allergies:  Allergies   Allergen Reactions   • Cinnamon Anaphylaxis   • Penicillins Shortness Of Breath   • Peanut-Containing Drug Products      Medications:  Prior to Admission medications    Medication Sig Start Date End Date Taking? Authorizing Provider   b complex vitamins capsule Take  by mouth.   Yes Provider, MD Abbi   calcium carbonate (OS-SHAISTA) 600 MG tablet Take  by mouth.   Yes  "ProviderAbbi MD   Cholecalciferol (VITAMIN D3) 2000 units capsule Take  by mouth.   Yes ProviderAbbi MD   fluconazole (Diflucan) 150 MG tablet Day 1 take 1 tablet.  If no improvement in symptoms in 3 days, take the second tablet. 12/7/22  Yes Ave Canada APRN   guaiFENesin (Mucinex) 600 MG 12 hr tablet Take 2 tablets by mouth 2 (Two) Times a Day for 7 days. 12/15/22 12/22/22 Yes Ave Canada APRN   MAGNESIUM PO Take 200 mg by mouth.   Yes ProviderAbbi MD   mupirocin (BACTROBAN) 2 % ointment Apply 1 application topically to the appropriate area as directed 3 (Three) Times a Day. 8/23/22  Yes Penny Mcgill APRN   potassium gluconate 595 (99 K) MG tablet tablet Take  by mouth.   Yes ProviderAbbi MD   SudoGest Maximum Strength 30 MG tablet  11/20/21  Yes ProviderAbbi MD   THYROID PO Take  by mouth.   Yes ProviderAbbi MD   Triamcinolone Acetonide (NASACORT) 55 MCG/ACT nasal inhaler 2 sprays into the nostril(s) as directed by provider Daily. 1/11/22 1/11/23 Yes Vanessa Regalado APRN   Zinc 50 MG tablet Take  by mouth.   Yes ProviderAbbi MD   azithromycin (Zithromax Z-Pardeep) 250 MG tablet Take 2 tablets by mouth on day 1, then 1 tablet daily on days 2-5 12/7/22 12/15/22 Yes Ave Canada APRN   guaiFENesin (Mucinex) 600 MG 12 hr tablet Take 2 tablets by mouth 2 (Two) Times a Day for 7 days. 12/7/22 12/15/22  Ave Canada APRN       Objective     Vital Signs: /84 (BP Location: Right arm, Patient Position: Sitting, Cuff Size: Adult)   Pulse 111   Temp 97.8 °F (36.6 °C) (Infrared)   Resp 16   Ht 180.3 cm (71\")   Wt 51.3 kg (113 lb)   LMP 06/28/2001 (Exact Date)   SpO2 98%   BMI 15.76 kg/m²     Physical Exam  Vitals and nursing note reviewed.   Constitutional:       Appearance: Normal appearance.   HENT:      Head: Normocephalic.      Right Ear: Tympanic membrane is bulging.      Left Ear: " Tympanic membrane is bulging.      Nose: Nose normal.      Mouth/Throat:      Mouth: Mucous membranes are moist.   Eyes:      Extraocular Movements: Extraocular movements intact.      Pupils: Pupils are equal, round, and reactive to light.   Cardiovascular:      Rate and Rhythm: Normal rate and regular rhythm.      Pulses: Normal pulses.      Heart sounds: Normal heart sounds.   Pulmonary:      Effort: Pulmonary effort is normal.      Breath sounds: Examination of the left-lower field reveals rhonchi. Rhonchi present.   Abdominal:      General: Bowel sounds are normal.      Palpations: Abdomen is soft.   Musculoskeletal:         General: Normal range of motion.      Cervical back: Normal range of motion.   Skin:     General: Skin is warm and dry.   Neurological:      General: No focal deficit present.      Mental Status: She is alert and oriented to person, place, and time.   Psychiatric:         Mood and Affect: Mood normal.         Behavior: Behavior normal.         Thought Content: Thought content normal.         Judgment: Judgment normal.         Results Reviewed:  Glucose   Date Value Ref Range Status   12/05/2017 86 70 - 100 mg/dL Final     BUN   Date Value Ref Range Status   12/05/2017 14 5 - 21 mg/dL Final     Creatinine   Date Value Ref Range Status   12/05/2017 0.77 0.50 - 1.40 mg/dL Final     Sodium   Date Value Ref Range Status   12/05/2017 142 135 - 145 mmol/L Final     Potassium   Date Value Ref Range Status   12/05/2017 4.1 3.5 - 5.3 mmol/L Final     Chloride   Date Value Ref Range Status   12/05/2017 102 98 - 110 mmol/L Final     Total CO2   Date Value Ref Range Status   12/05/2017 29.0 24.0 - 31.0 mmol/L Final     Calcium   Date Value Ref Range Status   12/05/2017 10.1 8.4 - 10.4 mg/dL Final     ALT (SGPT)   Date Value Ref Range Status   12/05/2017 42 0 - 54 U/L Final     AST (SGOT)   Date Value Ref Range Status   12/05/2017 37 7 - 45 U/L Final     WBC   Date Value Ref Range Status   12/05/2017 8.19  4.80 - 10.80 10*3/mm3 Final     Hematocrit   Date Value Ref Range Status   12/05/2017 45.5 37.0 - 47.0 % Final     Platelets   Date Value Ref Range Status   12/05/2017 249 130 - 400 10*3/mm3 Final     Triglycerides   Date Value Ref Range Status   12/05/2017 45 0 - 149 mg/dL Final     HDL Cholesterol   Date Value Ref Range Status   12/05/2017 77 >=50 mg/dL Final     LDL Cholesterol    Date Value Ref Range Status   12/05/2017 59 0 - 99 mg/dL Final     LDL/HDL Ratio   Date Value Ref Range Status   12/05/2017 0.77  Final     Hemoglobin A1C   Date Value Ref Range Status   12/05/2017 5.10 % Final     Comment:     Less than 6.0           Non-Diabetic Range  6.0-7.0                 ADA Therapeutic Target  Greater than 7.0        Action Suggested           Assessment / Plan     Assessment/Plan     Diagnoses and all orders for this visit:    1. Bronchitis (Primary)  -     doxycycline (VIBRAMYCIN) 100 MG capsule; Take 1 capsule by mouth 2 (Two) Times a Day for 5 days.  Dispense: 10 capsule; Refill: 0  -     promethazine-dextromethorphan (PROMETHAZINE-DM) 6.25-15 MG/5ML syrup; Take 5 mL by mouth 4 (Four) Times a Day As Needed for Cough.  Dispense: 180 mL; Refill: 0  -     predniSONE (DELTASONE) 20 MG tablet; Take 1 tablet by mouth Daily for 7 days.  Dispense: 7 tablet; Refill: 0         An After Visit Summary was printed and given to the patient at discharge.  Return if symptoms worsen or fail to improve.    I have discussed the patient results/orders and plan/recommendation with them at today's visit.      Ave Canada, APRN   12/15/2022

## 2022-12-15 NOTE — TELEPHONE ENCOUNTER
Pending Prescriptions:                       Disp   Refills    guaiFENesin (Mucinex) 600 MG 12 hr tablet  28 tab*0        Sig: Take 2 tablets by mouth 2 (Two) Times a Day for 7           days.

## 2022-12-29 RX ORDER — GUAIFENESIN 600 MG/1
1200 TABLET, EXTENDED RELEASE ORAL 2 TIMES DAILY
COMMUNITY
End: 2022-12-29 | Stop reason: SDUPTHER

## 2022-12-29 NOTE — TELEPHONE ENCOUNTER
Ave had told this patient that when her mucinex ran out she could just call in and get a refill sent. Can you refill it for her?

## 2022-12-31 RX ORDER — GUAIFENESIN 600 MG/1
1200 TABLET, EXTENDED RELEASE ORAL 2 TIMES DAILY
Qty: 28 TABLET | Refills: 0 | Status: SHIPPED | OUTPATIENT
Start: 2022-12-31 | End: 2023-01-07

## 2023-02-09 ENCOUNTER — OFFICE VISIT (OUTPATIENT)
Dept: INTERNAL MEDICINE | Facility: CLINIC | Age: 68
End: 2023-02-09
Payer: MEDICARE

## 2023-02-09 VITALS — HEIGHT: 71 IN | BODY MASS INDEX: 15.76 KG/M2

## 2023-02-09 DIAGNOSIS — Z20.822 CLOSE EXPOSURE TO COVID-19 VIRUS: ICD-10-CM

## 2023-02-09 DIAGNOSIS — J02.9 SORE THROAT: Primary | ICD-10-CM

## 2023-02-09 DIAGNOSIS — R05.1 ACUTE COUGH: Primary | ICD-10-CM

## 2023-02-09 LAB
EXPIRATION DATE: ABNORMAL
FLUAV AG UPPER RESP QL IA.RAPID: NOT DETECTED
FLUBV AG UPPER RESP QL IA.RAPID: NOT DETECTED
INTERNAL CONTROL: ABNORMAL
Lab: ABNORMAL
SARS-COV-2 AG UPPER RESP QL IA.RAPID: DETECTED

## 2023-02-09 PROCEDURE — 87428 SARSCOV & INF VIR A&B AG IA: CPT

## 2023-02-09 RX ORDER — GUAIFENESIN 600 MG/1
1200 TABLET, EXTENDED RELEASE ORAL 2 TIMES DAILY
Qty: 30 TABLET | Refills: 0 | Status: SHIPPED | OUTPATIENT
Start: 2023-02-09 | End: 2023-04-03 | Stop reason: SDUPTHER

## 2023-02-09 NOTE — PROGRESS NOTES
Lilli Trivedi  1955  [id number]    Verified patient's NAME and  before test was performed.     COVID-19 Test Performed:   Nasopharyngeal Swab     Location:  PATIENT VEHICLE     How did the patient tolerate the test?   Well tolerated by patient..    Staff Member Performing Test:  Anna Franco CMA    PPE Worn:    mask, gloves, eye protection, face shield and gown

## 2023-02-14 ENCOUNTER — TELEPHONE (OUTPATIENT)
Dept: INTERNAL MEDICINE | Facility: CLINIC | Age: 68
End: 2023-02-14
Payer: MEDICARE

## 2023-02-14 RX ORDER — AZITHROMYCIN 250 MG/1
TABLET, FILM COATED ORAL
Qty: 6 TABLET | Refills: 0 | Status: SHIPPED | OUTPATIENT
Start: 2023-02-14 | End: 2023-04-03 | Stop reason: SDUPTHER

## 2023-02-14 NOTE — TELEPHONE ENCOUNTER
Called patient at her request. She states that she thinks now her symptoms have turned in to a sinus infection and needs [facility] to send in an antibiotic for her. She would prefer a zpak. I advised patient I would consult with clayton about medication. She voiced understanding.

## 2023-02-16 ENCOUNTER — TELEPHONE (OUTPATIENT)
Dept: INTERNAL MEDICINE | Facility: CLINIC | Age: 68
End: 2023-02-16
Payer: MEDICARE

## 2023-02-16 DIAGNOSIS — J06.9 UPPER RESPIRATORY TRACT INFECTION, UNSPECIFIED TYPE: ICD-10-CM

## 2023-02-16 RX ORDER — FLUCONAZOLE 150 MG/1
TABLET ORAL
Qty: 2 TABLET | Refills: 0 | Status: SHIPPED | OUTPATIENT
Start: 2023-02-16

## 2023-02-16 NOTE — TELEPHONE ENCOUNTER
Patient called asking for 2 pills of Diflucan due to being prone to yeast infections after taking antibiotics. She would like med sent to [facility].

## 2023-04-03 ENCOUNTER — OFFICE VISIT (OUTPATIENT)
Dept: FAMILY MEDICINE CLINIC | Facility: CLINIC | Age: 68
End: 2023-04-03
Payer: MEDICARE

## 2023-04-03 VITALS
WEIGHT: 121.6 LBS | OXYGEN SATURATION: 98 % | HEART RATE: 84 BPM | TEMPERATURE: 97.8 F | RESPIRATION RATE: 18 BRPM | BODY MASS INDEX: 17.02 KG/M2 | SYSTOLIC BLOOD PRESSURE: 129 MMHG | DIASTOLIC BLOOD PRESSURE: 78 MMHG | HEIGHT: 71 IN

## 2023-04-03 DIAGNOSIS — J40 BRONCHITIS: Primary | ICD-10-CM

## 2023-04-03 DIAGNOSIS — R09.89 CHEST CONGESTION: ICD-10-CM

## 2023-04-03 DIAGNOSIS — R05.9 COUGH, UNSPECIFIED TYPE: ICD-10-CM

## 2023-04-03 DIAGNOSIS — R09.81 NASAL CONGESTION: ICD-10-CM

## 2023-04-03 DIAGNOSIS — R50.9 FEVER, UNSPECIFIED FEVER CAUSE: ICD-10-CM

## 2023-04-03 LAB
EXPIRATION DATE: NORMAL
FLUAV AG UPPER RESP QL IA.RAPID: NOT DETECTED
FLUBV AG UPPER RESP QL IA.RAPID: NOT DETECTED
INTERNAL CONTROL: NORMAL
Lab: NORMAL
SARS-COV-2 AG UPPER RESP QL IA.RAPID: NOT DETECTED

## 2023-04-03 PROCEDURE — 87428 SARSCOV & INF VIR A&B AG IA: CPT | Performed by: NURSE PRACTITIONER

## 2023-04-03 PROCEDURE — 1160F RVW MEDS BY RX/DR IN RCRD: CPT | Performed by: NURSE PRACTITIONER

## 2023-04-03 PROCEDURE — 1159F MED LIST DOCD IN RCRD: CPT | Performed by: NURSE PRACTITIONER

## 2023-04-03 PROCEDURE — 99213 OFFICE O/P EST LOW 20 MIN: CPT | Performed by: NURSE PRACTITIONER

## 2023-04-03 RX ORDER — METHYLPREDNISOLONE 4 MG/1
TABLET ORAL
Qty: 21 TABLET | Refills: 0 | Status: SHIPPED | OUTPATIENT
Start: 2023-04-03 | End: 2023-04-14 | Stop reason: HOSPADM

## 2023-04-03 RX ORDER — GUAIFENESIN 600 MG/1
1200 TABLET, EXTENDED RELEASE ORAL 2 TIMES DAILY
Qty: 120 TABLET | Refills: 2 | Status: SHIPPED | OUTPATIENT
Start: 2023-04-03

## 2023-04-03 RX ORDER — AZITHROMYCIN 250 MG/1
TABLET, FILM COATED ORAL
Qty: 6 TABLET | Refills: 0 | Status: SHIPPED | OUTPATIENT
Start: 2023-04-03 | End: 2023-04-14 | Stop reason: HOSPADM

## 2023-04-03 NOTE — PROGRESS NOTES
Subjective     Chief Complaint   Patient presents with   • URI   • Nasal Congestion   • Cough   • Fever   • Wheezing       History of Present Illness  Exposed to synthetic candle burning on Thursday and symptoms started after that.  She is allergic to candles and scents.  Fever on Friday but none since.  Congestion and productive cough, wheezing, sinus pressure and nasal congestion.        Patient's PMR from outside medical facility reviewed and noted.    Review of Systems   Constitutional: Negative for fever.   HENT: Positive for congestion, sinus pressure and sore throat.    Respiratory: Positive for cough and wheezing. Negative for shortness of breath.         Otherwise complete ROS reviewed and negative except as mentioned in the HPI.    Past Medical History:   Past Medical History:   Diagnosis Date   • Disease of thyroid gland      Past Surgical History:  Past Surgical History:   Procedure Laterality Date   • HYSTERECTOMY      with BSO   • TONSILLECTOMY       Social History:  reports that she has never smoked. She has never used smokeless tobacco. She reports current alcohol use. She reports that she does not use drugs.    Family History: family history includes Breast cancer (age of onset: 80) in her maternal grandmother; Lung cancer in her father; No Known Problems in her mother and sister; Prostate cancer (age of onset: 70) in her maternal grandfather; Prostate cancer (age of onset: 80) in her brother.      Allergies:  Allergies   Allergen Reactions   • Cinnamon Anaphylaxis   • Penicillins Shortness Of Breath   • Peanut-Containing Drug Products      Medications:  Prior to Admission medications    Medication Sig Start Date End Date Taking? Authorizing Provider   b complex vitamins capsule Take  by mouth.   Yes Abbi De Jesus MD   calcium carbonate (OS-SHAISTA) 600 MG tablet Take  by mouth.   Yes Abbi De Jesus MD   Cholecalciferol (VITAMIN D3) 2000 units capsule Take  by mouth.   Yes  Abbi De Jesus MD   fluconazole (Diflucan) 150 MG tablet Day 1 take 1 tablet.  If no improvement in symptoms in 3 days, take the second tablet. 2/16/23  Yes Vanessa Regalado APRN   guaiFENesin (Mucinex) 600 MG 12 hr tablet Take 2 tablets by mouth 2 (Two) Times a Day. 2/9/23  Yes Penny Mcgill APRN   MAGNESIUM PO Take 200 mg by mouth.   Yes Abbi De Jesus MD   potassium gluconate 595 (99 K) MG tablet tablet Take  by mouth.   Yes Abbi De Jesus MD   THYROID PO Take  by mouth.   Yes Abbi De Jesus MD   Zinc 50 MG tablet Take  by mouth.   Yes Abbi De Jesus MD   azithromycin (Zithromax Z-Pardeep) 250 MG tablet Take 2 tablets the first day, then 1 tablet daily for 4 days.  Patient not taking: Reported on 4/3/2023 2/14/23   Vanessa Regalado APRN   mupirocin (BACTROBAN) 2 % ointment Apply 1 application topically to the appropriate area as directed 3 (Three) Times a Day.  Patient not taking: Reported on 4/3/2023 8/23/22   Penny Mcgill APRN   promethazine-dextromethorphan (PROMETHAZINE-DM) 6.25-15 MG/5ML syrup Take 5 mL by mouth 4 (Four) Times a Day As Needed for Cough.  Patient not taking: Reported on 4/3/2023 12/15/22   Ave Canada APRN   SudoGest Maximum Strength 30 MG tablet  11/20/21   Abbi De Jesus MD         PHQ-9 Depression Screening  Little interest or pleasure in doing things? 0-->not at all   Feeling down, depressed, or hopeless? 0-->not at all   Trouble falling or staying asleep, or sleeping too much?     Feeling tired or having little energy?     Poor appetite or overeating?     Feeling bad about yourself - or that you are a failure or have let yourself or your family down?     Trouble concentrating on things, such as reading the newspaper or watching television?     Moving or speaking so slowly that other people could have noticed? Or the opposite - being so fidgety or restless that you have been moving around a lot more than usual?    "  Thoughts that you would be better off dead, or of hurting yourself in some way?     PHQ-9 Total Score 0   If you checked off any problems, how difficult have these problems made it for you to do your work, take care of things at home, or get along with other people?         PHQ-9 Total Score: 0   0 (Negative screening for depression)      Objective     Vital Signs: /78 (BP Location: Left arm, Patient Position: Sitting, Cuff Size: Adult)   Pulse 84   Temp 97.8 °F (36.6 °C) (Infrared)   Resp 18   Ht 180.3 cm (71\")   Wt 55.2 kg (121 lb 9.6 oz)   LMP 06/28/2001 (Exact Date)   SpO2 98%   BMI 16.96 kg/m²   Physical Exam  Vitals and nursing note reviewed.   Constitutional:       Appearance: Normal appearance.   HENT:      Head: Normocephalic.      Right Ear: Tympanic membrane normal.      Left Ear: Tympanic membrane normal.      Nose: Congestion present.      Mouth/Throat:      Mouth: Mucous membranes are moist.      Pharynx: Posterior oropharyngeal erythema (posterio pharynx) present.   Eyes:      Extraocular Movements: Extraocular movements intact.      Pupils: Pupils are equal, round, and reactive to light.   Cardiovascular:      Rate and Rhythm: Normal rate and regular rhythm.      Pulses: Normal pulses.      Heart sounds: Normal heart sounds.   Pulmonary:      Effort: Pulmonary effort is normal.      Breath sounds: Normal breath sounds.      Comments: Congested cough    Musculoskeletal:         General: Normal range of motion.      Cervical back: Normal range of motion.   Skin:     General: Skin is warm and dry.   Neurological:      General: No focal deficit present.      Mental Status: She is alert and oriented to person, place, and time.   Psychiatric:         Mood and Affect: Mood normal.         Behavior: Behavior normal.         Thought Content: Thought content normal.         Judgment: Judgment normal.         BMI is below normal parameters (malnutrition). Recommendations: none (medical " contraindication)      Advance Care Planning   ACP discussion was held with the patient during this visit.         Results Reviewed:  Glucose   Date Value Ref Range Status   12/05/2017 86 70 - 100 mg/dL Final     BUN   Date Value Ref Range Status   12/05/2017 14 5 - 21 mg/dL Final     Creatinine   Date Value Ref Range Status   12/05/2017 0.77 0.50 - 1.40 mg/dL Final     Sodium   Date Value Ref Range Status   12/05/2017 142 135 - 145 mmol/L Final     Potassium   Date Value Ref Range Status   12/05/2017 4.1 3.5 - 5.3 mmol/L Final     Chloride   Date Value Ref Range Status   12/05/2017 102 98 - 110 mmol/L Final     Total CO2   Date Value Ref Range Status   12/05/2017 29.0 24.0 - 31.0 mmol/L Final     Calcium   Date Value Ref Range Status   12/05/2017 10.1 8.4 - 10.4 mg/dL Final     ALT (SGPT)   Date Value Ref Range Status   12/05/2017 42 0 - 54 U/L Final     AST (SGOT)   Date Value Ref Range Status   12/05/2017 37 7 - 45 U/L Final     WBC   Date Value Ref Range Status   12/05/2017 8.19 4.80 - 10.80 10*3/mm3 Final     Hematocrit   Date Value Ref Range Status   12/05/2017 45.5 37.0 - 47.0 % Final     Platelets   Date Value Ref Range Status   12/05/2017 249 130 - 400 10*3/mm3 Final     Triglycerides   Date Value Ref Range Status   12/05/2017 45 0 - 149 mg/dL Final     HDL Cholesterol   Date Value Ref Range Status   12/05/2017 77 >=50 mg/dL Final     LDL Cholesterol    Date Value Ref Range Status   12/05/2017 59 0 - 99 mg/dL Final     LDL/HDL Ratio   Date Value Ref Range Status   12/05/2017 0.77  Final     Hemoglobin A1C   Date Value Ref Range Status   12/05/2017 5.10 % Final     Comment:     Less than 6.0           Non-Diabetic Range  6.0-7.0                 ADA Therapeutic Target  Greater than 7.0        Action Suggested       POCT SARS-CoV-2 Antigen AMAURY + Flu (04/03/2023 10:06)      Assessment / Plan     Assessment/Plan     Diagnoses and all orders for this visit:    1. Bronchitis (Primary)  -     guaiFENesin (Mucinex)  600 MG 12 hr tablet; Take 2 tablets by mouth 2 (Two) Times a Day.  Dispense: 120 tablet; Refill: 2  -     azithromycin (Zithromax Z-Pardeep) 250 MG tablet; Take 2 tablets the first day, then 1 tablet daily for 4 days.  Dispense: 6 tablet; Refill: 0  -     methylPREDNISolone (MEDROL) 4 MG dose pack; Take as directed on package instructions.  Dispense: 21 tablet; Refill: 0    2. Chest congestion  -     POCT SARS-CoV-2 Antigen AMAURY + Flu  -     guaiFENesin (Mucinex) 600 MG 12 hr tablet; Take 2 tablets by mouth 2 (Two) Times a Day.  Dispense: 120 tablet; Refill: 2  -     azithromycin (Zithromax Z-Pardeep) 250 MG tablet; Take 2 tablets the first day, then 1 tablet daily for 4 days.  Dispense: 6 tablet; Refill: 0  -     methylPREDNISolone (MEDROL) 4 MG dose pack; Take as directed on package instructions.  Dispense: 21 tablet; Refill: 0    3. Cough, unspecified type  -     POCT SARS-CoV-2 Antigen AMAURY + Flu  -     guaiFENesin (Mucinex) 600 MG 12 hr tablet; Take 2 tablets by mouth 2 (Two) Times a Day.  Dispense: 120 tablet; Refill: 2  -     azithromycin (Zithromax Z-Pardeep) 250 MG tablet; Take 2 tablets the first day, then 1 tablet daily for 4 days.  Dispense: 6 tablet; Refill: 0  -     methylPREDNISolone (MEDROL) 4 MG dose pack; Take as directed on package instructions.  Dispense: 21 tablet; Refill: 0    4. Nasal congestion  -     POCT SARS-CoV-2 Antigen AMAURY + Flu    5. Fever, unspecified fever cause  -     POCT SARS-CoV-2 Antigen AMAURY + Flu         An After Visit Summary was printed and given to the patient at discharge.  Return if symptoms worsen or fail to improve.    I have discussed the patient results/orders and plan/recommendation with them at today's visit.      Ave Canada, RASHI   04/03/2023

## 2023-04-11 ENCOUNTER — HOSPITAL ENCOUNTER (INPATIENT)
Facility: HOSPITAL | Age: 68
LOS: 3 days | Discharge: NURSING, REHAB OR LONG-TERM CARE | DRG: 308 | End: 2023-04-14
Attending: INTERNAL MEDICINE | Admitting: INTERNAL MEDICINE
Payer: MEDICARE

## 2023-04-11 ENCOUNTER — TELEPHONE (OUTPATIENT)
Dept: INTERNAL MEDICINE | Facility: CLINIC | Age: 68
End: 2023-04-11
Payer: MEDICARE

## 2023-04-11 ENCOUNTER — APPOINTMENT (OUTPATIENT)
Dept: GENERAL RADIOLOGY | Facility: HOSPITAL | Age: 68
DRG: 308 | End: 2023-04-11
Payer: MEDICARE

## 2023-04-11 DIAGNOSIS — Z78.9 DECREASED ACTIVITIES OF DAILY LIVING (ADL): ICD-10-CM

## 2023-04-11 DIAGNOSIS — Z74.09 IMPAIRED MOBILITY: ICD-10-CM

## 2023-04-11 PROBLEM — I48.91 ATRIAL FIBRILLATION WITH RAPID VENTRICULAR RESPONSE: Status: ACTIVE | Noted: 2023-04-11

## 2023-04-11 LAB
ALBUMIN SERPL-MCNC: 3.8 G/DL (ref 3.5–5.2)
ALBUMIN/GLOB SERPL: 1.5 G/DL
ALP SERPL-CCNC: 95 U/L (ref 39–117)
ALT SERPL W P-5'-P-CCNC: 215 U/L (ref 1–33)
ANION GAP SERPL CALCULATED.3IONS-SCNC: 10 MMOL/L (ref 5–15)
ANISOCYTOSIS BLD QL: ABNORMAL
AST SERPL-CCNC: 103 U/L (ref 1–32)
BASOPHILS # BLD MANUAL: 0.27 10*3/MM3 (ref 0–0.2)
BASOPHILS NFR BLD MANUAL: 1 % (ref 0–1.5)
BILIRUB SERPL-MCNC: 0.6 MG/DL (ref 0–1.2)
BUN SERPL-MCNC: 12 MG/DL (ref 8–23)
BUN/CREAT SERPL: 29.3 (ref 7–25)
CALCIUM SPEC-SCNC: 10.2 MG/DL (ref 8.6–10.5)
CHLORIDE SERPL-SCNC: 98 MMOL/L (ref 98–107)
CHOLEST SERPL-MCNC: 162 MG/DL (ref 0–200)
CK SERPL-CCNC: 729 U/L (ref 20–180)
CO2 SERPL-SCNC: 31 MMOL/L (ref 22–29)
CREAT SERPL-MCNC: 0.41 MG/DL (ref 0.57–1)
DEPRECATED RDW RBC AUTO: 45 FL (ref 37–54)
EGFRCR SERPLBLD CKD-EPI 2021: 108 ML/MIN/1.73
ERYTHROCYTE [DISTWIDTH] IN BLOOD BY AUTOMATED COUNT: 14.2 % (ref 12.3–15.4)
GLOBULIN UR ELPH-MCNC: 2.5 GM/DL
GLUCOSE SERPL-MCNC: 115 MG/DL (ref 65–99)
HCT VFR BLD AUTO: 40.2 % (ref 34–46.6)
HDLC SERPL-MCNC: 66 MG/DL (ref 40–60)
HGB BLD-MCNC: 13.7 G/DL (ref 12–15.9)
LDLC SERPL CALC-MCNC: 79 MG/DL (ref 0–100)
LDLC/HDLC SERPL: 1.17 {RATIO}
LYMPHOCYTES # BLD MANUAL: 1.36 10*3/MM3 (ref 0.7–3.1)
LYMPHOCYTES NFR BLD MANUAL: 3 % (ref 5–12)
MAGNESIUM SERPL-MCNC: 1.7 MG/DL (ref 1.6–2.4)
MCH RBC QN AUTO: 29.5 PG (ref 26.6–33)
MCHC RBC AUTO-ENTMCNC: 34.1 G/DL (ref 31.5–35.7)
MCV RBC AUTO: 86.6 FL (ref 79–97)
METAMYELOCYTES NFR BLD MANUAL: 3 % (ref 0–0)
MONOCYTES # BLD: 0.81 10*3/MM3 (ref 0.1–0.9)
MYELOCYTES NFR BLD MANUAL: 3 % (ref 0–0)
NEUTROPHILS # BLD AUTO: 23.02 10*3/MM3 (ref 1.7–7)
NEUTROPHILS NFR BLD MANUAL: 77.8 % (ref 42.7–76)
NEUTS BAND NFR BLD MANUAL: 7.1 % (ref 0–5)
PLAT MORPH BLD: NORMAL
PLATELET # BLD AUTO: 275 10*3/MM3 (ref 140–450)
PMV BLD AUTO: 9.1 FL (ref 6–12)
POIKILOCYTOSIS BLD QL SMEAR: ABNORMAL
POLYCHROMASIA BLD QL SMEAR: ABNORMAL
POTASSIUM SERPL-SCNC: 2.7 MMOL/L (ref 3.5–5.2)
PROCALCITONIN SERPL-MCNC: 0.07 NG/ML (ref 0–0.25)
PROT SERPL-MCNC: 6.3 G/DL (ref 6–8.5)
RBC # BLD AUTO: 4.64 10*6/MM3 (ref 3.77–5.28)
SODIUM SERPL-SCNC: 139 MMOL/L (ref 136–145)
T4 FREE SERPL-MCNC: 1.06 NG/DL (ref 0.93–1.7)
TRIGL SERPL-MCNC: 93 MG/DL (ref 0–150)
TSH SERPL DL<=0.05 MIU/L-ACNC: 0.65 UIU/ML (ref 0.27–4.2)
VARIANT LYMPHS NFR BLD MANUAL: 2 % (ref 0–5)
VARIANT LYMPHS NFR BLD MANUAL: 3 % (ref 19.6–45.3)
VLDLC SERPL-MCNC: 17 MG/DL (ref 5–40)
WBC MORPH BLD: NORMAL
WBC NRBC COR # BLD: 27.13 10*3/MM3 (ref 3.4–10.8)

## 2023-04-11 PROCEDURE — 85025 COMPLETE CBC W/AUTO DIFF WBC: CPT | Performed by: INTERNAL MEDICINE

## 2023-04-11 PROCEDURE — 84439 ASSAY OF FREE THYROXINE: CPT | Performed by: INTERNAL MEDICINE

## 2023-04-11 PROCEDURE — 85007 BL SMEAR W/DIFF WBC COUNT: CPT | Performed by: INTERNAL MEDICINE

## 2023-04-11 PROCEDURE — 84145 PROCALCITONIN (PCT): CPT | Performed by: INTERNAL MEDICINE

## 2023-04-11 PROCEDURE — 25010000002 LEVOFLOXACIN PER 250 MG: Performed by: INTERNAL MEDICINE

## 2023-04-11 PROCEDURE — 80061 LIPID PANEL: CPT | Performed by: INTERNAL MEDICINE

## 2023-04-11 PROCEDURE — 0 MAGNESIUM SULFATE 4 GM/100ML SOLUTION: Performed by: INTERNAL MEDICINE

## 2023-04-11 PROCEDURE — 84443 ASSAY THYROID STIM HORMONE: CPT | Performed by: INTERNAL MEDICINE

## 2023-04-11 PROCEDURE — 80053 COMPREHEN METABOLIC PANEL: CPT | Performed by: INTERNAL MEDICINE

## 2023-04-11 PROCEDURE — 82550 ASSAY OF CK (CPK): CPT | Performed by: INTERNAL MEDICINE

## 2023-04-11 PROCEDURE — 83735 ASSAY OF MAGNESIUM: CPT | Performed by: INTERNAL MEDICINE

## 2023-04-11 PROCEDURE — 71045 X-RAY EXAM CHEST 1 VIEW: CPT

## 2023-04-11 PROCEDURE — 93005 ELECTROCARDIOGRAM TRACING: CPT | Performed by: INTERNAL MEDICINE

## 2023-04-11 PROCEDURE — 93010 ELECTROCARDIOGRAM REPORT: CPT | Performed by: INTERNAL MEDICINE

## 2023-04-11 RX ORDER — AMOXICILLIN 250 MG
2 CAPSULE ORAL 2 TIMES DAILY
Status: DISCONTINUED | OUTPATIENT
Start: 2023-04-11 | End: 2023-04-14 | Stop reason: HOSPADM

## 2023-04-11 RX ORDER — LEVOFLOXACIN 5 MG/ML
750 INJECTION, SOLUTION INTRAVENOUS EVERY 24 HOURS
Status: COMPLETED | OUTPATIENT
Start: 2023-04-11 | End: 2023-04-13

## 2023-04-11 RX ORDER — MAGNESIUM SULFATE HEPTAHYDRATE 40 MG/ML
2 INJECTION, SOLUTION INTRAVENOUS AS NEEDED
Status: DISCONTINUED | OUTPATIENT
Start: 2023-04-11 | End: 2023-04-14 | Stop reason: HOSPADM

## 2023-04-11 RX ORDER — BISACODYL 5 MG/1
5 TABLET, DELAYED RELEASE ORAL DAILY PRN
Status: DISCONTINUED | OUTPATIENT
Start: 2023-04-11 | End: 2023-04-14 | Stop reason: HOSPADM

## 2023-04-11 RX ORDER — BISACODYL 10 MG
10 SUPPOSITORY, RECTAL RECTAL DAILY PRN
Status: DISCONTINUED | OUTPATIENT
Start: 2023-04-11 | End: 2023-04-14 | Stop reason: HOSPADM

## 2023-04-11 RX ORDER — POTASSIUM CHLORIDE 1.5 G/1.77G
40 POWDER, FOR SOLUTION ORAL AS NEEDED
Status: DISCONTINUED | OUTPATIENT
Start: 2023-04-11 | End: 2023-04-14 | Stop reason: HOSPADM

## 2023-04-11 RX ORDER — SODIUM CHLORIDE 0.9 % (FLUSH) 0.9 %
10 SYRINGE (ML) INJECTION AS NEEDED
Status: DISCONTINUED | OUTPATIENT
Start: 2023-04-11 | End: 2023-04-14 | Stop reason: HOSPADM

## 2023-04-11 RX ORDER — POTASSIUM CHLORIDE 750 MG/1
40 CAPSULE, EXTENDED RELEASE ORAL AS NEEDED
Status: DISCONTINUED | OUTPATIENT
Start: 2023-04-11 | End: 2023-04-14 | Stop reason: HOSPADM

## 2023-04-11 RX ORDER — SODIUM CHLORIDE 0.9 % (FLUSH) 0.9 %
10 SYRINGE (ML) INJECTION EVERY 12 HOURS SCHEDULED
Status: DISCONTINUED | OUTPATIENT
Start: 2023-04-11 | End: 2023-04-14 | Stop reason: HOSPADM

## 2023-04-11 RX ORDER — CHLORHEXIDINE GLUCONATE 500 MG/1
1 CLOTH TOPICAL EVERY 24 HOURS
Status: DISCONTINUED | OUTPATIENT
Start: 2023-04-12 | End: 2023-04-12 | Stop reason: HOSPADM

## 2023-04-11 RX ORDER — CHLORHEXIDINE GLUCONATE 500 MG/1
1 CLOTH TOPICAL ONCE
Status: COMPLETED | OUTPATIENT
Start: 2023-04-11 | End: 2023-04-11

## 2023-04-11 RX ORDER — ONDANSETRON 2 MG/ML
4 INJECTION INTRAMUSCULAR; INTRAVENOUS EVERY 6 HOURS PRN
Status: DISCONTINUED | OUTPATIENT
Start: 2023-04-11 | End: 2023-04-14 | Stop reason: HOSPADM

## 2023-04-11 RX ORDER — MAGNESIUM SULFATE HEPTAHYDRATE 40 MG/ML
4 INJECTION, SOLUTION INTRAVENOUS AS NEEDED
Status: DISCONTINUED | OUTPATIENT
Start: 2023-04-11 | End: 2023-04-14 | Stop reason: HOSPADM

## 2023-04-11 RX ORDER — POLYETHYLENE GLYCOL 3350 17 G/17G
17 POWDER, FOR SOLUTION ORAL DAILY PRN
Status: DISCONTINUED | OUTPATIENT
Start: 2023-04-11 | End: 2023-04-14 | Stop reason: HOSPADM

## 2023-04-11 RX ORDER — SODIUM CHLORIDE 9 MG/ML
40 INJECTION, SOLUTION INTRAVENOUS AS NEEDED
Status: DISCONTINUED | OUTPATIENT
Start: 2023-04-11 | End: 2023-04-14 | Stop reason: HOSPADM

## 2023-04-11 RX ADMIN — CHLORHEXIDINE GLUCONATE 1 APPLICATION: 500 CLOTH TOPICAL at 16:50

## 2023-04-11 RX ADMIN — Medication 10 ML: at 20:45

## 2023-04-11 RX ADMIN — POTASSIUM CHLORIDE 40 MEQ: 10 CAPSULE, COATED, EXTENDED RELEASE ORAL at 16:49

## 2023-04-11 RX ADMIN — Medication 1 APPLICATION: at 16:50

## 2023-04-11 RX ADMIN — APIXABAN 5 MG: 5 TABLET, FILM COATED ORAL at 17:51

## 2023-04-11 RX ADMIN — LEVOFLOXACIN 750 MG: 5 INJECTION, SOLUTION INTRAVENOUS at 16:30

## 2023-04-11 RX ADMIN — METOPROLOL TARTRATE 25 MG: 25 TABLET, FILM COATED ORAL at 20:45

## 2023-04-11 RX ADMIN — MAGNESIUM SULFATE HEPTAHYDRATE 4 G: 40 INJECTION, SOLUTION INTRAVENOUS at 17:52

## 2023-04-11 RX ADMIN — DOCUSATE SODIUM 50 MG AND SENNOSIDES 8.6 MG 2 TABLET: 8.6; 5 TABLET, FILM COATED ORAL at 20:45

## 2023-04-11 RX ADMIN — POTASSIUM CHLORIDE 40 MEQ: 10 CAPSULE, COATED, EXTENDED RELEASE ORAL at 20:53

## 2023-04-11 NOTE — PROGRESS NOTES
"Pharmacy Dosing Service  Anticoagulant  Apixaban    Assessment/Action/Plan:  Pharmacy to dose Eliquis for the indication of atrial fibrillation. Initiated Eliquis 5 mg po Q12H. Pharmacy will continue to monitor and adjust dose accordingly.      Subjective:  Lilli Trivedi is a 67 y.o. female on Apixaban 5 mg po every 12 hours for indication of atrial fibrillation.  Objective:  [Ht: 180.3 cm (71\"); Wt: 58.5 kg (129 lb 0.4 oz); BMI: Body mass index is 18 kg/m².]  Estimated Creatinine Clearance: 123 mL/min (A) (by C-G formula based on SCr of 0.41 mg/dL (L)). No results found for: DDIMER No results found for: INR, PROTIME   Lab Results   Component Value Date    HGB 13.7 04/11/2023    HGB 14.6 12/05/2017      Lab Results   Component Value Date     04/11/2023     12/05/2017       Jose Bell, PharmD  04/11/23 16:36 CDT     "

## 2023-04-11 NOTE — TELEPHONE ENCOUNTER
Pt's brother called to let Malika know that pt is transporting now to  and will be in unit [facility].  Pt's Kidney's barely are functioning due to hydration.

## 2023-04-11 NOTE — H&P
[facility] Medicine Services  HISTORY AND PHYSICAL    Date of Admission: 4/11/2023  Primary Care Physician: Vanessa Regalado APRN    Subjective   Primary Historian:  Patient  Chief Complaint: Transferred here because of poorly controlled heart rate in patient with atrial fibrillation (reportedly new diagnosis).  Family also requested the transfer.    History of Present Illness  Patient is a 67-year-old woman who on March 12 suffered the loss of a family member.  Approximately 2 weeks later went to sign legal papers and reportedly inhaled cinnamon burning in a candle.  She states allergy to cinnamon.  She had some reaction and went to her primary care provider.  She was prescribed Zithromax and prednisone.  She subsequently became weak to the point were she fell to the wall and stayed on the ground for couple of days.  When she regained some strength, she was able to crawl and make a phone call for help.  She was brought to the emergency room and was subsequently admitted with concern for rhabdomyolysis.  Her CPK reportedly 21,000 and troponin was 288.  She had CT of the chest which reportedly revealed pneumonia in the setting of elevated white count of 22,000.  Her initial EKG showed atrial fibrillation.  It is not known to her that she has prior atrial fibrillation.  She has no palpitation until she got bronchodilator treatment.  She was continued on Solu-Medrol while in the hospital  She received anticoagulation, antibiotic and was receiving Cardizem.  I am told that her heart rate had not been adequately controlled.  On transfer I was informed that her heart rate was 104 with blood pressure of 122/66 and SPO2 of 94%.  Echocardiogram showed LV cavity is normal in size.  Normal global wall motion.  EF is 60 to 65% with normal diastolic filling.  Structurally trileaflet aortic valve with mild regurgitation, mild mitral regurgitation.  No evidence of pulmonary hypertension  "in the 3 cm diameter liver cyst reported.    On my interview with patient  She said that she has nonproductive coughing spell, wheezing.  She denies any fever chills shortness of breath or difficulty breathing.  She also told me that she had not urinated quite well until recently.    She told me on April 5 she lost her balance and has diminished muscle strength.  She is interested in undergoing physical rehabilitation.  She lives by herself.  She is concerned about her safety on her own at home    The review of diagnostic studies from outside hospital  White count 25.8 \"(in the setting of steroid use) hemoglobin 12.9, platelet count of 249, no evidence of left shift predominance of neutrophils at 65%  Potassium 2.8  When she came in she had a 2.8 potassium which went as high as 5.6 but subsequently went down to its level.  I am informed that it was replaced/supplemented  When she came in her white count was 22.7 but og to 25.2.  Once again this is in the setting of steroid use.  He may have been dehydrated also her hemoglobin is 15.4.  BUN/creatinine ratio was 46.3 on admit but has normal creatinine of 0.8 while BUN was 37  Urinalysis showed RBC of 11-20.    She has a negative MRSA nasal swab  She has had CT of the cervical spine, CT of the chest without contrast CT facial bones (noted that she has periorbital edema mostly in the right lower quadrant) I did not see any report of the CT imaging.  I am told though by our nurse that she has been found with nasal fracture, she has a soft cast on left upper extremity for wrist issue.  She has several Kerlix wrap bilateral knees and right distal leg      EKG reviewed showed atrial fibrillation on April 7.  On April 8 she was normal sinus rhythm with sinus arrhythmia.  On April 10 she had atrial fibrillation with rate of 123    I am told that they have a cardiology nurse practitioner (Lissa Galaviz who normally comes once a week) apparently had not come.  Not sure " whether she was scheduled to come.  Transferring nurse randy requested transfer because of poorly controlled heart rate.  Family concur.  Heart rate when discussed with me was 103/104.  Rate seem to have been better controlled    When I saw her in the unit, she is running at  heart rate.  Telemetry showed    Overall patient expressed that she is doing better.      Review of Systems   Otherwise complete ROS reviewed and negative except as mentioned in the HPI.    Past Medical History:   Past Medical History:   Diagnosis Date   • Disease of thyroid gland      Past Surgical History:  Past Surgical History:   Procedure Laterality Date   • HYSTERECTOMY      with BSO   • TONSILLECTOMY       Social History:  reports that she has never smoked. She has never used smokeless tobacco. She reports current alcohol use. She reports that she does not use drugs.    Family History: family history includes Breast cancer (age of onset: 80) in her maternal grandmother; Lung cancer in her father; No Known Problems in her mother and sister; Prostate cancer (age of onset: 70) in her maternal grandfather; Prostate cancer (age of onset: 80) in her brother.      Allergies:  Allergies   Allergen Reactions   • Cinnamon Anaphylaxis   • Penicillins Shortness Of Breath   • Peanut-Containing Drug Products    • Barley Grass Rash   • Broccoli [Brassica Oleracea] Rash   • Mustard Rash     Sore throat        Medications:  Prior to Admission medications    Medication Sig Start Date End Date Taking? Authorizing Provider   b complex vitamins capsule Take 1 capsule by mouth Daily.   Yes Abbi De Jesus MD   calcium carbonate (OS-SHAISTA) 600 MG tablet Take 1 tablet by mouth Daily.   Yes Abbi De Jesus MD   Cholecalciferol (VITAMIN D3) 2000 units capsule Take 1 capsule by mouth Daily.   Yes Abbi De Jesus MD   guaiFENesin (Mucinex) 600 MG 12 hr tablet Take 2 tablets by mouth 2 (Two) Times a Day. 4/3/23  Yes [address]  "RASHI Villar   MAGNESIUM PO Take 200 mg by mouth Daily.   Yes Provider, MD Abbi   potassium gluconate 595 (99 K) MG tablet tablet Take 1 tablet by mouth Daily.   Yes Provider, MD Abbi   thyroid (ARMOUR) 15 MG tablet Take 1 tablet by mouth Daily.   Yes Provider, MD Abbi   Zinc 50 MG tablet Take 1 tablet by mouth Daily.   Yes Provider, MD Abbi   azithromycin (Zithromax Z-Pardeep) 250 MG tablet Take 2 tablets the first day, then 1 tablet daily for 4 days.  Patient not taking: Reported on 4/11/2023 4/3/23   [address] APRN   fluconazole (Diflucan) 150 MG tablet Day 1 take 1 tablet.  If no improvement in symptoms in 3 days, take the second tablet.  Patient not taking: Reported on 4/11/2023 2/16/23   Vanessa Regalado APRN   methylPREDNISolone (MEDROL) 4 MG dose pack Take as directed on package instructions.  Patient not taking: Reported on 4/11/2023 4/3/23   [address] APRN     I have utilized all available immediate resources to obtain, update, or review the patient's current medications (including all prescriptions, over-the-counter products, herbals, cannabis/cannabidiol products, and vitamin/mineral/dietary (nutritional) supplements).    Objective     Vital Signs: /82   Pulse 89   Temp 98.3 °F (36.8 °C) (Oral)   Resp 11   Ht 180.3 cm (71\")   Wt 58.5 kg (129 lb 0.4 oz)   LMP 06/28/2001 (Exact Date)   SpO2 95%   BMI 18.00 kg/m²   Physical Exam   GEN: Awake, alert, interactive, in NAD  HEENT: Atraumatic, PERRLA, EOMI, Anicteric, Trachea midline  Lungs: CTAB, no wheezing/rales/rhonchi  Heart: RRR, +S1/s2, no rub  ABD: soft, nt/nd, +BS, no guarding/rebound  Extremities: atraumatic, no cyanosis, no edema  Skin: no rashes or lesions (patient has multiple dressing on bilateral knees, distal right leg, right elbow and has a soft cast on left upper extremity)  She also has contusion right inferior portion of orbit    Neuro: AAOx3, no focal " deficits  Psych: normal mood & affect        Results Reviewed:  Lab Results (last 24 hours)     ** No results found for the last 24 hours. **        Imaging Results (Last 24 Hours)     Procedure Component Value Units Date/Time    XR Chest 1 View [[id number]] Resulted: 04/11/23 1429     Updated: 04/11/23 1429        I have personally reviewed and interpreted the radiology studies and ECG obtained at time of admission.     Assessment / Plan   Assessment:   There are no active hospital problems to display for this patient.      Medical Decision Making  Number and Complexity of problems:  · Atrial fibrillation new onset.  Rate now controlled or at least better controlled  · Fall secondary to generalized weakness  · Rhabdomyolysis-nontraumatic.  Historically been on the floor for couple of days.  · Bilateral nasal fracture  · Decreased urine output.  Normal creatinine  · Hypokalemia  · Being treated for pneumonia prior to this admission.      Treatment Plan  · The patient will be admitted to my service here at [facility].  · I do not think patient needs bronchodilator treatment at this time.  I also do not think that she needs Solu-Medrol.  · I will order for chest x-ray  · EKG reviewed showed atrial fibrillation rate controlled  · Recheck chemistries, CPK, CBC  · Have asked the nurse to check prior use of anticoagulation from transferring facility.  I also would like to asked the nurse to find out head CT report and other imaging study prior to starting Eliquis.  · We will check with pharmacist regarding appropriate dosing of Eliquis.  Her weight is at the borderline cutoff of 60.  Creatinine not yet available but normal from prior check at outside hospital.  Her age is only 62.  She might still be at the 5 mg twice daily dose.  · We will recheck potassium and placed on replacement protocol.  We will also check magnesium.  · At this point I do not see any particular need for hydration but it will all depend  on her CPK, renal function.  · She is generally weak.  There is nothing to suggest that this was a stroke event.  Will request PT OT to evaluate.  · She is concerned about living by herself.  She is interested to know her functional capacity and open for placement if needed.  · Patient is hemodynamically stable and appropriate to be transferred to telemetry floor.  · We will obtain medication list and resume accordingly.  · We will obtain culture report from outside hospital, will resume Levaquin.  · We will start on beta-blocker 25 mg twice daily of Lopressor.  We will consider putting him back on Cardizem drip on as-needed basis    Conditions and Status        Stable  MDM Data  External documents reviewed: Reviewed   cardiac tracing (EKG, telemetry) interpretation: Reviewed  Radiology interpretation: None available but reported to have had left metacarpal fracture of the fourth and fifth.    Labs reviewed: Yes  Any tests that were considered but not ordered: None     Decision rules/scores evaluated (example [id number], Wells, etc):   We will calculate CHADS2 vasc 2  No history of bleeding      Discussed with: Patient and sisters     Care Planning  Shared decision making: Patient and sisters   code status and discussions: Full code  Disposition  Social Determinants of Health that impact treatment or disposition: None at this time  Estimated length of stay is to be determined    I confirmed that the patient's advanced care plan is present, code status is documented, and a surrogate decision maker is listed in the patient's medical record.     The patient's surrogate decision maker is sister Henrik.    The patient was seen and examined by me on   Electronically signed by Carlos King MD, 04/11/23, 14:35 CDT.        Made available and showed no acute process in the brain  Bilateral nasal fracture  Degenerative changes on CT cervical spine  Progress note indicates fourth and fifth left  metacarpal fracture.  I am told that she should follow-up with Ortho and the swelling subside.      Electronically signed by Carlos King MD, 04/11/23, 3:26 PM CDT.

## 2023-04-12 PROBLEM — M62.82 RHABDOMYOLYSIS: Status: ACTIVE | Noted: 2023-04-12

## 2023-04-12 PROBLEM — E87.6 HYPOKALEMIA: Status: ACTIVE | Noted: 2023-04-12

## 2023-04-12 PROBLEM — R74.01 TRANSAMINITIS: Status: ACTIVE | Noted: 2023-04-12

## 2023-04-12 PROBLEM — S02.2XXA NASAL FRACTURE: Status: ACTIVE | Noted: 2023-04-12

## 2023-04-12 LAB
ALBUMIN SERPL-MCNC: 3.5 G/DL (ref 3.5–5.2)
ALBUMIN/GLOB SERPL: 1.6 G/DL
ALP SERPL-CCNC: 89 U/L (ref 39–117)
ALT SERPL W P-5'-P-CCNC: 171 U/L (ref 1–33)
ANION GAP SERPL CALCULATED.3IONS-SCNC: 10 MMOL/L (ref 5–15)
AST SERPL-CCNC: 65 U/L (ref 1–32)
BILIRUB SERPL-MCNC: 0.5 MG/DL (ref 0–1.2)
BUN SERPL-MCNC: 26 MG/DL (ref 8–23)
BUN/CREAT SERPL: 50 (ref 7–25)
CALCIUM SPEC-SCNC: 10.6 MG/DL (ref 8.6–10.5)
CHLORIDE SERPL-SCNC: 100 MMOL/L (ref 98–107)
CK SERPL-CCNC: 428 U/L (ref 20–180)
CO2 SERPL-SCNC: 26 MMOL/L (ref 22–29)
CREAT SERPL-MCNC: 0.52 MG/DL (ref 0.57–1)
DEPRECATED RDW RBC AUTO: 46.4 FL (ref 37–54)
EGFRCR SERPLBLD CKD-EPI 2021: 102 ML/MIN/1.73
ERYTHROCYTE [DISTWIDTH] IN BLOOD BY AUTOMATED COUNT: 14.6 % (ref 12.3–15.4)
GLOBULIN UR ELPH-MCNC: 2.2 GM/DL
GLUCOSE SERPL-MCNC: 130 MG/DL (ref 65–99)
HCT VFR BLD AUTO: 41 % (ref 34–46.6)
HGB BLD-MCNC: 13.4 G/DL (ref 12–15.9)
LYMPHOCYTES # BLD MANUAL: 3.18 10*3/MM3 (ref 0.7–3.1)
LYMPHOCYTES NFR BLD MANUAL: 8.1 % (ref 5–12)
MAGNESIUM SERPL-MCNC: 2.6 MG/DL (ref 1.6–2.4)
MCH RBC QN AUTO: 28.9 PG (ref 26.6–33)
MCHC RBC AUTO-ENTMCNC: 32.7 G/DL (ref 31.5–35.7)
MCV RBC AUTO: 88.4 FL (ref 79–97)
METAMYELOCYTES NFR BLD MANUAL: 2 % (ref 0–0)
MONOCYTES # BLD: 2.55 10*3/MM3 (ref 0.1–0.9)
MYELOCYTES NFR BLD MANUAL: 4 % (ref 0–0)
NEUTROPHILS # BLD AUTO: 23.86 10*3/MM3 (ref 1.7–7)
NEUTROPHILS NFR BLD MANUAL: 74.7 % (ref 42.7–76)
NEUTS BAND NFR BLD MANUAL: 1 % (ref 0–5)
PLAT MORPH BLD: NORMAL
PLATELET # BLD AUTO: 316 10*3/MM3 (ref 140–450)
PMV BLD AUTO: 9.2 FL (ref 6–12)
POIKILOCYTOSIS BLD QL SMEAR: ABNORMAL
POTASSIUM SERPL-SCNC: 4.2 MMOL/L (ref 3.5–5.2)
POTASSIUM SERPL-SCNC: 4.4 MMOL/L (ref 3.5–5.2)
PROT SERPL-MCNC: 5.7 G/DL (ref 6–8.5)
RBC # BLD AUTO: 4.64 10*6/MM3 (ref 3.77–5.28)
SODIUM SERPL-SCNC: 136 MMOL/L (ref 136–145)
VARIANT LYMPHS NFR BLD MANUAL: 1 % (ref 0–5)
VARIANT LYMPHS NFR BLD MANUAL: 9.1 % (ref 19.6–45.3)
WBC MORPH BLD: NORMAL
WBC NRBC COR # BLD: 31.5 10*3/MM3 (ref 3.4–10.8)

## 2023-04-12 PROCEDURE — 85025 COMPLETE CBC W/AUTO DIFF WBC: CPT | Performed by: INTERNAL MEDICINE

## 2023-04-12 PROCEDURE — 97165 OT EVAL LOW COMPLEX 30 MIN: CPT | Performed by: OCCUPATIONAL THERAPIST

## 2023-04-12 PROCEDURE — 82550 ASSAY OF CK (CPK): CPT | Performed by: INTERNAL MEDICINE

## 2023-04-12 PROCEDURE — 85007 BL SMEAR W/DIFF WBC COUNT: CPT | Performed by: INTERNAL MEDICINE

## 2023-04-12 PROCEDURE — 80053 COMPREHEN METABOLIC PANEL: CPT | Performed by: INTERNAL MEDICINE

## 2023-04-12 PROCEDURE — 83735 ASSAY OF MAGNESIUM: CPT | Performed by: INTERNAL MEDICINE

## 2023-04-12 PROCEDURE — 84132 ASSAY OF SERUM POTASSIUM: CPT | Performed by: INTERNAL MEDICINE

## 2023-04-12 PROCEDURE — 25010000002 LEVOFLOXACIN PER 250 MG: Performed by: INTERNAL MEDICINE

## 2023-04-12 RX ORDER — METOPROLOL TARTRATE 50 MG/1
50 TABLET, FILM COATED ORAL EVERY 12 HOURS SCHEDULED
Status: DISCONTINUED | OUTPATIENT
Start: 2023-04-12 | End: 2023-04-14 | Stop reason: HOSPADM

## 2023-04-12 RX ORDER — LEVOTHYROXINE AND LIOTHYRONINE 9.5; 2.25 UG/1; UG/1
15 TABLET ORAL DAILY
Status: DISCONTINUED | OUTPATIENT
Start: 2023-04-12 | End: 2023-04-14 | Stop reason: HOSPADM

## 2023-04-12 RX ORDER — GUAIFENESIN 600 MG/1
1200 TABLET, EXTENDED RELEASE ORAL EVERY 12 HOURS SCHEDULED
Status: DISCONTINUED | OUTPATIENT
Start: 2023-04-12 | End: 2023-04-14 | Stop reason: HOSPADM

## 2023-04-12 RX ADMIN — METOPROLOL TARTRATE 50 MG: 50 TABLET, FILM COATED ORAL at 21:12

## 2023-04-12 RX ADMIN — GUAIFENESIN 1200 MG: 600 TABLET, EXTENDED RELEASE ORAL at 21:12

## 2023-04-12 RX ADMIN — Medication 10 ML: at 08:17

## 2023-04-12 RX ADMIN — APIXABAN 5 MG: 5 TABLET, FILM COATED ORAL at 17:04

## 2023-04-12 RX ADMIN — DOCUSATE SODIUM 50 MG AND SENNOSIDES 8.6 MG 2 TABLET: 8.6; 5 TABLET, FILM COATED ORAL at 21:12

## 2023-04-12 RX ADMIN — Medication 10 ML: at 21:12

## 2023-04-12 RX ADMIN — THYROID, PORCINE 15 MG: 15 TABLET ORAL at 17:05

## 2023-04-12 RX ADMIN — LEVOFLOXACIN 750 MG: 5 INJECTION, SOLUTION INTRAVENOUS at 17:05

## 2023-04-12 RX ADMIN — APIXABAN 5 MG: 5 TABLET, FILM COATED ORAL at 06:42

## 2023-04-12 RX ADMIN — Medication 1 APPLICATION: at 08:16

## 2023-04-12 RX ADMIN — METOPROLOL TARTRATE 25 MG: 25 TABLET, FILM COATED ORAL at 08:16

## 2023-04-12 RX ADMIN — MUPIROCIN 1 APPLICATION: 20 OINTMENT TOPICAL at 22:00

## 2023-04-12 RX ADMIN — POTASSIUM CHLORIDE 40 MEQ: 10 CAPSULE, COATED, EXTENDED RELEASE ORAL at 00:11

## 2023-04-12 NOTE — THERAPY EVALUATION
Patient Name: Lilli Trivedi  : 1955    MRN: [id number]                              Today's Date: 2023       Admit Date: 2023    Visit Dx:     ICD-10-CM ICD-9-CM   1. Decreased activities of daily living (ADL)  Z78.9 V49.89     Patient Active Problem List   Diagnosis   • Seasonal allergic rhinitis   • Atrial fibrillation with rapid ventricular response   • Hypokalemia   • Non-traumatic rhabdomyolysis   • Nasal fracture   • Transaminitis     Past Medical History:   Diagnosis Date   • Disease of thyroid gland      Past Surgical History:   Procedure Laterality Date   • HYSTERECTOMY      with BSO   • TONSILLECTOMY        General Information     Row Name 23 1256          OT Time and Intention    Document Type evaluation  t/f from [facility] with a-fib and RVR, pnuemonia, Rhabdomyolysis-nontraumatic.  Recent fx of L UE - NWB in soft cast/splint  -JJ     Mode of Treatment occupational therapy  -JJ     Row Name 23 1256          General Information    Patient Profile Reviewed yes  -JJ     Prior Level of Function independent:;all household mobility;transfer;bed mobility;ADL's  -JJ     Existing Precautions/Restrictions fall  -JJ     Barriers to Rehab previous functional deficit  -JJ     Row Name 23 1256          Living Environment    People in Home alone  -JJ     Row Name 23 1256          Home Main Entrance    Number of Stairs, Main Entrance other (see comments)  ramp  -JJ     Stair Railings, Main Entrance railings on both sides of stairs  -JJ     Row Name 23 1256          Stairs Within Home, Primary    Number of Stairs, Within Home, Primary none  -JJ     Stair Railings, Within Home, Primary none  -JJ     Row Name 23 1256          Cognition    Orientation Status (Cognition) oriented x 4  -JJ     Row Name 23 1256          Safety Issues, Functional Mobility    Impairments Affecting Function (Mobility) balance;endurance/activity tolerance;strength  -JJ           User Key  (r) =  Recorded By, (t) = Taken By, (c) = Cosigned By    Initials Name Provider Type     Vanessa Leger OTR/L, MAGED Occupational Therapist                 Mobility/ADL's     [facility] 04/12/23 1256          Bed Mobility    Comment, (Bed Mobility) up in chair  -JJ     Row Name 04/12/23 1256          Transfers    Transfers sit-stand transfer;stand-sit transfer  -JJ     Row Name 04/12/23 1256          Sit-Stand Transfer    Sit-Stand [place] (Transfers) minimum assist (75% patient effort);moderate assist (50% patient effort)  -JJ     Row Name 04/12/23 1256          Stand-Sit Transfer    Stand-Sit [place] (Transfers) minimum assist (75% patient effort)  -JJ     Row Name 04/12/23 1256          Functional Mobility    Functional Mobility- Ind. Level minimum assist (75% patient effort)  -     Functional Mobility- Device --  -     Functional Mobility- Comment amb in hallway.  LOB x1 requiring Min/Mod A.  -JJ     Row Name 04/12/23 1256          Activities of Daily Living    BADL Assessment/Intervention lower body dressing  -JJ     Row Name 04/12/23 1256          Mobility    Extremity Weight-bearing Status left upper extremity  -     Left Upper Extremity (Weight-bearing Status) non weight-bearing (NWB)  -JJ     Row Name 04/12/23 1256          Lower Body Dressing Assessment/Training    [place] Level (Lower Body Dressing) don;socks;moderate assist (50% patient effort)  -     Position (Lower Body Dressing) unsupported sitting  -           User Key  (r) = Recorded By, (t) = Taken By, (c) = Cosigned By    Initials Name Provider Type    Vanessa Syed OTR/L, MAGED Occupational Therapist               Obj/Interventions     Row Name 04/12/23 1256          Sensory Assessment (Somatosensory)    Sensory Assessment (Somatosensory) UE sensation intact  -JJ     Row Name 04/12/23 1256          Vision Assessment/Intervention    Visual Impairment/Limitations WFL  -JJ     Row Name 04/12/23 1256          Range of  Motion Comprehensive    General Range of Motion bilateral upper extremity ROM WFL  -JJ     Comment, General Range of Motion except for L hand/wrist - casted from recent fx.  -JJ     Row Name 04/12/23 1256          Strength Comprehensive (MMT)    Comment, General Manual Muscle Testing (MMT) Assessment BUE strength grossly 4/5  -JJ     Row Name 04/12/23 1256          Balance    Balance Assessment sitting static balance;sitting dynamic balance;standing dynamic balance;standing static balance  -JJ     Static Sitting Balance independent  -JJ     Dynamic Sitting Balance standby assist  -JJ     Position, Sitting Balance unsupported;supported;sitting in chair  -JJ     Static Standing Balance contact guard;minimal assist  -JJ     Dynamic Standing Balance minimal assist  -JJ     Position/Device Used, Standing Balance unsupported  -JJ           User Key  (r) = Recorded By, (t) = Taken By, (c) = Cosigned By    Initials Name Provider Type    Vanessa Hutton, OTR/L, CSRS Occupational Therapist               Goals/Plan     Row Name 04/12/23 1256          Dressing Goal 1 (OT)    Activity/Device (Dressing Goal 1, OT) lower body dressing  -JJ     [place]/Cues Needed (Dressing Goal 1, OT) standby assist  -JJ     Time Frame (Dressing Goal 1, OT) long term goal (LTG);by discharge  -JJ     Progress/Outcome (Dressing Goal 1, OT) new goal  -     Row Name 04/12/23 1256          Toileting Goal 1 (OT)    Activity/Device (Toileting Goal 1, OT) toileting skills, all  -JJ     [place] Level/Cues Needed (Toileting Goal 1, OT) independent  -JJ     Time Frame (Toileting Goal 1, OT) long term goal (LTG);by discharge  -JJ     Progress/Outcome (Toileting Goal 1, OT) new goal  -J     Row Name 04/12/23 1256          Strength Goal 1 (OT)    Strength Goal 1 (OT) Pt will increase B UE strength to 4+/5 for improved independence with adls and mobility.  -JJ     Time Frame (Strength Goal 1, OT) long term goal (LTG);by discharge  -J      Progress/Outcome (Strength Goal 1, OT) new goal  -JJ     Row Name 04/12/23 1256          Problem Specific Goal 1 (OT)    Problem Specific Goal 1 (OT) Pt will participate in 10 minutes of adl tasks in standing without LOB or rest break to increase her tolerance for activity.  -JJ     Time Frame (Problem Specific Goal 1, OT) long term goal (LTG);by discharge  -JJ     Progress/Outcome (Problem Specific Goal 1, OT) new goal  -JJ     Row Name 04/12/23 1256          Therapy Assessment/Plan (OT)    Planned Therapy Interventions (OT) activity tolerance training;BADL retraining;functional balance retraining;transfer/mobility retraining;strengthening exercise;occupation/activity based interventions;patient/caregiver education/training;adaptive equipment training  -JJ           User Key  (r) = Recorded By, (t) = Taken By, (c) = Cosigned By    Initials Name Provider Type    Vanessa Hutton, OTR/L, CSRS Occupational Therapist               Clinical Impression     Row Name 04/12/23 1256          Pain Assessment    Additional Documentation Pain Scale: FACES Pre/Post-Treatment (Group)  -JJ     Row Name 04/12/23 1256          Pain Scale: FACES Pre/Post-Treatment    Pain: FACES Scale, Pretreatment 2-->hurts little bit  -JJ     Pain Location - Side/Orientation Left  -JJ     Pain Location upper  -JJ     Pain Location - extremity  -JJ     Row Name 04/12/23 1256          Plan of Care Review    Plan of Care Reviewed With patient  -JJ     Outcome Evaluation OT eval completed. Pt presents alert and oriented x4, resting comfortably in chair. She has a soft cast/splint on L wrist/forearm, pt reports recent fall with subsequent fx. Treated NWB until further orders clarified by MD. She reports at baseline being independent with all adls and mobility, but has had a progressive decline and been unable to care for herself. Today she demonstrates decreased strength, balance, activity tolerance and ROM. She required Mod A for sit <> stand t/f  from chair. Amb in hallway with Min A, one LOB requiring Min/Mod A to maintain upright position. She is at an increased risk for falls d/t balance and strength deficits. Recommend d/c to short term rehab prior to returning home.  -     Row Name 04/12/23 1256          Therapy Assessment/Plan (OT)    Rehab Potential (OT) good, to achieve stated therapy goals  -     Criteria for Skilled Therapeutic Interventions Met (OT) yes;skilled treatment is necessary  -     Therapy Frequency (OT) 5 times/wk  -     Predicted Duration of Therapy Intervention (OT) 10 days  -     Row Name 04/12/23 1256          Therapy Plan Review/Discharge Plan (OT)    Anticipated Discharge Disposition (OT) skilled nursing facility  -     Row Name 04/12/23 1256          Positioning and Restraints    Pre-Treatment Position sitting in chair/recliner  -     Post Treatment Position chair  -J     In Chair notified nsg;sitting;call light within reach;encouraged to call for assist;with other staff  with PCT  -           User Key  (r) = Recorded By, (t) = Taken By, (c) = Cosigned By    Initials Name Provider Type     Vanessa Leger, DAER/L, CSRS Occupational Therapist               Outcome Measures     Row Name 04/12/23 0748          How much help from another person do you currently need...    Turning from your back to your side while in flat bed without using bedrails? 3  -AC     Moving from lying on back to sitting on the side of a flat bed without bedrails? 3  -AC     Moving to and from a bed to a chair (including a wheelchair)? 3  -AC     Standing up from a chair using your arms (e.g., wheelchair, bedside chair)? 3  -AC     Climbing 3-5 steps with a railing? 3  -AC     To walk in hospital room? 3  -AC     AM-PAC 6 Clicks Score (PT) 18  -AC     Highest level of mobility 6 --> Walked 10 steps or more  -           User Key  (r) = Recorded By, (t) = Taken By, (c) = Cosigned By    Initials Name Provider Type    Arun Barksdale, RN  Registered Nurse                Occupational Therapy Education     Title: PT OT SLP Therapies (In Progress)     Topic: Occupational Therapy (In Progress)     Point: ADL training (Done)     Description:   Instruct learner(s) on proper safety adaptation and remediation techniques during self care or transfers.   Instruct in proper use of assistive devices.              Learning Progress Summary           Patient Acceptance, E, VU by  at 4/12/2023 1333                   Point: Home exercise program (Not Started)     Description:   Instruct learner(s) on appropriate technique for monitoring, assisting and/or progressing therapeutic exercises/activities.              Learner Progress:  Not documented in this visit.          Point: Precautions (Done)     Description:   Instruct learner(s) on prescribed precautions during self-care and functional transfers.              Learning Progress Summary           Patient Acceptance, E, VU by FAROOQ at 4/12/2023 1333                   Point: Body mechanics (Not Started)     Description:   Instruct learner(s) on proper positioning and spine alignment during self-care, functional mobility activities and/or exercises.              Learner Progress:  Not documented in this visit.                      User Key     Initials Effective Dates Name Provider Type Discipline     11/10/21 -  Vanessa Leger, DESTINEY/L, CSRS Occupational Therapist OT              OT Recommendation and Plan  Planned Therapy Interventions (OT): activity tolerance training, BADL retraining, functional balance retraining, transfer/mobility retraining, strengthening exercise, occupation/activity based interventions, patient/caregiver education/training, adaptive equipment training  Therapy Frequency (OT): 5 times/wk  Plan of Care Review  Plan of Care Reviewed With: patient  Outcome Evaluation: OT eval completed. Pt presents alert and oriented x4, resting comfortably in chair. She has a soft cast/splint on L  wrist/forearm, pt reports recent fall with subsequent fx. Treated NWB until further orders clarified by MD. She reports at baseline being independent with all adls and mobility, but has had a progressive decline and been unable to care for herself. Today she demonstrates decreased strength, balance, activity tolerance and ROM. She required Mod A for sit <> stand t/f from chair. Amb in hallway with Min A, one LOB requiring Min/Mod A to maintain upright position. She is at an increased risk for falls d/t balance and strength deficits. Recommend d/c to short term rehab prior to returning home.     Time Calculation:    Time Calculation- OT     Row Name 04/12/23 1256             Time Calculation- OT    OT Start Time 1256  -      OT Stop Time 1334  -      OT Time Calculation (min) 38 min  -      OT Received On 04/12/23  -BRYSON      OT Goal Re-Cert Due Date 04/22/23  -            User Key  (r) = Recorded By, (t) = Taken By, (c) = Cosigned By    Initials Name Provider Type    Vanessa Hutton OTR/L, MAGED Occupational Therapist              Therapy Charges for Today     Code Description Service Date Service Provider Modifiers Qty    [id number] HC OT EVAL LOW COMPLEXITY 3 4/12/2023 Vanessa Leger OTR/L, MAGED GO 1               Vanessa Leger, OTR/L, MAGED  4/12/2023

## 2023-04-12 NOTE — PLAN OF CARE
Goal Outcome Evaluation:  Plan of Care Reviewed With: patient           Outcome Evaluation: OT eval completed. Pt presents alert and oriented x4, resting comfortably in chair. She has a soft cast/splint on L wrist/forearm, pt reports recent fall with subsequent fx. Treated NWB until further orders clarified by MD. She reports at baseline being independent with all adls and mobility, but has had a progressive decline and been unable to care for herself. Today she demonstrates decreased strength, balance, activity tolerance and ROM. She required Mod A for sit <> stand t/f from chair. Amb in hallway with Min A, one LOB requiring Min/Mod A to maintain upright position. She is at an increased risk for falls d/t balance and strength deficits. Recommend d/c to short term rehab prior to returning home.

## 2023-04-12 NOTE — PLAN OF CARE
Goal Outcome Evaluation:  Plan of Care Reviewed With: patient        Progress: improving  Outcome Evaluation: VSS. Denies pain this shift. AFL . Wound care done per orders. PT/OT working with pt. Safety maintained. Will cont to monitor and call MD with any changes.

## 2023-04-12 NOTE — PLAN OF CARE
Goal Outcome Evaluation:  Plan of Care Reviewed With: patient        Progress: improving  Outcome Evaluation: Pt slept well throughout night, VSS, A-fib  up to 144. Sat on edge of bed for a few minutes and tolerated well. Using bedpan until PT evaluates, continent of b&b. No c/o pain or discomfort. Bruising noted on arms, legs, back, and face from fall at home. Potassium and magnesium stabilized. Safety maintained. Will continue to monitor.

## 2023-04-12 NOTE — CASE MANAGEMENT/SOCIAL WORK
Discharge Planning Assessment  [facility]     Patient Name: Lilli Trivedi  MRN: [id number]  Today's Date: 4/12/2023    Admit Date: 4/11/2023    Plan: Spoke with patient and daughter in room for dc planning. Prior to admission patient lived alone and was independent for daily needs / activity. Has good support from daughter. Patient requesting some short term rehab prior to returning home. PT / OT evals ordered and pending. Would like referral sent to [facility]. Social Pilar alves notified and referral sent. Will follow for bed offer.   Discharge Needs Assessment     Row Name 04/12/23 1502       Living Environment    People in Home alone    Current Living Arrangements home    Primary Care Provided by self    Family Caregiver if Needed child(good), adult    Quality of Family Relationships supportive;involved    Able to Return to Prior Arrangements yes       Transition Planning    Patient/Family Anticipates Transition to inpatient rehabilitation facility    Patient/Family Anticipated Services at Transition rehabilitation services       Discharge Needs Assessment    Equipment Currently Used at Home none               Discharge Plan     Row Name 04/12/23 1502       Plan    Plan Spoke with patient and daughter in room for dc planning. Prior to admission patient lived alone and was independent for daily needs / activity. Has good support from daughter. Patient requesting some short term rehab prior to returning home. PT / OT evals ordered and pending. Would like referral sent to [facility]. Pilar Lopez notified and referral sent. Will follow for bed offer.              Continued Care and Services - Admitted Since 4/11/2023    Coordination has not been started for this encounter.       Expected Discharge Date and Time     Expected Discharge Date Expected Discharge Time    Apr 13, 2023          Demographic Summary    No documentation.                Functional Status    No documentation.                Psychosocial    No  documentation.                Abuse/Neglect    No documentation.                Legal    No documentation.                Substance Abuse    No documentation.                Patient Forms    No documentation.                   Merlina A Fletcher, RN

## 2023-04-12 NOTE — PLAN OF CARE
Goal Outcome Evaluation:              Outcome Evaluation: NTN assessment complete. Noted BMI 18.17 kg/m^2; but, weight has increased 10-15lb from last year. Pt has food allergies; these were updated in EMR to include reactions and severity. Pt does eat healthy; food allergies restrict comfort of eating outside of the home. Encouraged PO. Food preferences observed with daily menu selections. Will continue to follow per protocol.

## 2023-04-12 NOTE — CONSULTS
[facility]  INPATIENT WOUND & OSTOMY CONSULTATION    Today's Date: 04/12/23    Patient Name: Lilli Trivedi  MRN: [id number]  CSN: [id number]  PCP: Vanessa Regalado APRN  Referring Provider:   Consulting Provider (From admission, onward)    Start Ordered     Status Ordering Provider    04/11/23 1406 04/11/23 1406  Inpatient Wound Care Provider Consult  Once        Specialty:  Wound Care  Provider:  Nicolette Avelar APRN    Acknowledged ZOHAIB GRAHAM         Attending Provider: Zohaib Graham*  Length of Stay: 1    SUBJECTIVE   Chief Complaint: Multiple wounds of extremities related to fall    HPI: Lilli Trivedi, a 67 y.o.female, presents with a past medical history of thyroid disease.  A full past medical history as listed below.  Patient presented to the hospital on 4/11 as a transfer due to poorly controlled heart rate with atrial fibrillation.  Patient reported having allergy to cinnamon and was around cinnamon candle which caused the sequence of events including weakness falling where she laid for couple of days.  Patient states she was awake fell time trying to get to the phone and was crawling.    Inpatient wound care consulted due to wounds of extremities related to fall.  Patient has multiple abrasions.  She states that she did not lay in one place for a long period of time as she was awake the whole time she was on the floor.      Visit Dx:  No diagnosis found.    Hospital Problem List:     Atrial fibrillation with rapid ventricular response      History:   Past Medical History:   Diagnosis Date   • Disease of thyroid gland      Past Surgical History:   Procedure Laterality Date   • HYSTERECTOMY      with BSO   • TONSILLECTOMY       Social History     Socioeconomic History   • Marital status:    Tobacco Use   • Smoking status: Never   • Smokeless tobacco: Never   Vaping Use   • Vaping Use: Never used   Substance and Sexual Activity   • Alcohol use: Yes   •  Drug use: No   • Sexual activity: Yes     Birth control/protection: Post-menopausal     Family History   Problem Relation Age of Onset   • Lung cancer Father    • No Known Problems Mother    • Prostate cancer Brother 80   • No Known Problems Sister    • Breast cancer Maternal Grandmother 80   • Prostate cancer Maternal Grandfather 70   • Ovarian cancer Neg Hx    • Uterine cancer Neg Hx    • Colon cancer Neg Hx    • Melanoma Neg Hx        Allergies:  Allergies   Allergen Reactions   • Cinnamon Anaphylaxis   • Penicillins Shortness Of Breath   • Peanut-Containing Drug Products    • Barley Grass Rash   • Broccoli [Brassica Oleracea] Rash   • Mustard Rash     Sore throat        Medications:    Current Facility-Administered Medications:   •  apixaban (ELIQUIS) tablet 5 mg, 5 mg, Oral, Q12H, Carlos King MD, 5 mg at 04/12/23 0642  •  sennosides-docusate (PERICOLACE) 8.6-50 MG per tablet 2 tablet, 2 tablet, Oral, BID, 2 tablet at 04/11/23 2045 **AND** polyethylene glycol (MIRALAX) packet 17 g, 17 g, Oral, Daily PRN **AND** bisacodyl (DULCOLAX) EC tablet 5 mg, 5 mg, Oral, Daily PRN **AND** bisacodyl (DULCOLAX) suppository 10 mg, 10 mg, Rectal, Daily PRN, Carlos King MD  •  Chlorhexidine Gluconate Cloth 2 % pads 1 application, 1 application, Topical, Q24H, Carlos King MD  •  levoFLOXacin (LEVAQUIN) 750 mg/150 mL D5W (premix) (LEVAQUIN) 750 mg, 750 mg, Intravenous, Q24H, Carlos King MD, 750 mg at 04/11/23 1630  •  Magnesium Sulfate - Total Dose 10 grams - Magnesium 1 or Less, 2 g, Intravenous, PRN **OR** Magnesium Sulfate - Total Dose 6 grams - Magnesium 1.1 - 1.5, 2 g, Intravenous, PRN **OR** Magnesium Sulfate - Total Dose 4 grams - Magnesium 1.6 - 1.9, 4 g, Intravenous, PRN, Carlos King MD, Last Rate: 25 mL/hr at 04/11/23 1752, 4 g at 04/11/23 1752  •  metoprolol tartrate (LOPRESSOR) tablet 25 mg, 25 mg, Oral, Q12H, Carlos King MD, 25 mg at  04/11/23 2045  •  mupirocin (BACTROBAN) 2 % nasal ointment 1 application, 1 application, Each Nare, BID, Carlos King MD, 1 application at 04/11/23 1650  •  ondansetron (ZOFRAN) injection 4 mg, 4 mg, Intravenous, Q6H PRN, Carlos King MD  •  Pharmacy to Dose Eliquis, , Does not apply, Continuous PRN, Carlos King MD  •  potassium chloride (KLOR-CON) packet 40 mEq, 40 mEq, Oral, PRN, Carlos King MD  •  potassium chloride (MICRO-K) CR capsule 40 mEq, 40 mEq, Oral, PRN, Carlos King MD, 40 mEq at 04/12/23 0011  •  sodium chloride 0.9 % flush 10 mL, 10 mL, Intravenous, Q12H, Carlos King MD, 10 mL at 04/11/23 2045  •  sodium chloride 0.9 % flush 10 mL, 10 mL, Intravenous, PRN, Carlos King MD  •  sodium chloride 0.9 % infusion 40 mL, 40 mL, Intravenous, PRN, Carlos King MD    Review of Systems:   Review of Systems   Constitutional: Positive for fatigue. Negative for chills and fever.   HENT: Negative for rhinorrhea and sore throat.    Respiratory: Negative for cough and shortness of breath.    Cardiovascular: Negative for palpitations.   Gastrointestinal: Negative for diarrhea, nausea and vomiting.   Genitourinary: Negative for flank pain and hematuria.   Musculoskeletal: Positive for back pain, gait problem, myalgias and neck pain. Negative for arthralgias.   Skin: Positive for color change and wound.   Neurological: Positive for weakness. Negative for dizziness and headaches.   Psychiatric/Behavioral: Negative for agitation and behavioral problems.         OBJECTIVE     Vitals:    04/12/23 0700   BP: 136/80   Pulse: 102   Resp: 16   Temp: 98.1 °F (36.7 °C)   SpO2: 96%       PHYSICAL EXAM:   Physical Exam  Vitals and nursing note reviewed.   Constitutional:       General: She is awake.      Appearance: She is underweight.   HENT:      Head: Normocephalic.      Comments: Bruising of face   Eyes:      General: Lids  are normal. Gaze aligned appropriately.   Cardiovascular:      Rate and Rhythm: Tachycardia present. Rhythm irregular.   Pulmonary:      Effort: Pulmonary effort is normal. No respiratory distress.   Abdominal:      General: Abdomen is flat.      Palpations: Abdomen is soft.   Musculoskeletal:      Cervical back: Normal range of motion and neck supple.   Skin:     General: Skin is warm and dry.      Findings: Abrasion and erythema present.      Comments: Patient has multiple abrasions of lower extremities and elbow with erythema and bruising present.  Abrasions are dry or have scant amount of serous drainage.  Irregular edges attached to wound bed.  No signs of large undermining or tunneling wounds present.   Neurological:      Mental Status: She is alert and oriented to person, place, and time.      Motor: Weakness present.   Psychiatric:         Attention and Perception: Attention normal.         Mood and Affect: Mood normal.         Behavior: Behavior is cooperative.                                    Results Review:  Lab Results (last 48 hours)     Procedure Component Value Units Date/Time    Potassium [[id number]]  (Normal) Collected: 04/12/23 0309    Specimen: Blood Updated: 04/12/23 0421     Potassium 4.4 mmol/L     Manual Differential [[id number]]  (Abnormal) Collected: 04/11/23 1514    Specimen: Blood Updated: 04/11/23 1600     Neutrophil % 77.8 %      Lymphocyte % 3.0 %      Monocyte % 3.0 %      Basophil % 1.0 %      Bands %  7.1 %      Metamyelocyte % 3.0 %      Myelocyte % 3.0 %      Atypical Lymphocyte % 2.0 %      Neutrophils Absolute 23.02 10*3/mm3      Lymphocytes Absolute 1.36 10*3/mm3      Monocytes Absolute 0.81 10*3/mm3      Basophils Absolute 0.27 10*3/mm3      Anisocytosis Slight/1+     Poikilocytes Slight/1+     Polychromasia Slight/1+     WBC Morphology Normal     Platelet Morphology Normal    CBC & Differential [[id number]]  (Abnormal) Collected: 04/11/23 1514    Specimen: Blood Updated:  "04/11/23 1600    Narrative:      The following orders were created for panel order CBC & Differential.  Procedure                               Abnormality         Status                     ---------                               -----------         ------                     CBC Auto Differential[[id number]]        Abnormal            Final result                 Please view results for these tests on the individual orders.    CBC Auto Differential [[id number]]  (Abnormal) Collected: 04/11/23 1514    Specimen: Blood Updated: 04/11/23 1600     WBC 27.13 10*3/mm3      RBC 4.64 10*6/mm3      Hemoglobin 13.7 g/dL      Hematocrit 40.2 %      MCV 86.6 fL      MCH 29.5 pg      MCHC 34.1 g/dL      RDW 14.2 %      RDW-SD 45.0 fl      MPV 9.1 fL      Platelets 275 10*3/mm3     TSH [[id number]]  (Normal) Collected: 04/11/23 1514    Specimen: Blood Updated: 04/11/23 1558     TSH 0.651 uIU/mL     Procalcitonin [[id number]]  (Normal) Collected: 04/11/23 1514    Specimen: Blood Updated: 04/11/23 1557     Procalcitonin 0.07 ng/mL     Narrative:      As a Marker for Sepsis (Non-Neonates):    1. <0.5 ng/mL represents a low risk of severe sepsis and/or septic shock.  2. >2 ng/mL represents a high risk of severe sepsis and/or septic shock.    As a Marker for Lower Respiratory Tract Infections that require antibiotic therapy:    PCT on Admission    Antibiotic Therapy       6-12 Hrs later    >0.5                Strongly Recommended  >0.25 - <0.5        Recommended   0.1 - 0.25          Discouraged              Remeasure/reassess PCT  <0.1                Strongly Discouraged     Remeasure/reassess PCT    As 28 day mortality risk marker: \"Change in Procalcitonin Result\" (>80% or <=80%) if Day 0 (or Day 1) and Day 4 values are available. Refer to http://www.[employer]-pct-calculator.com    Change in PCT <=80%  A decrease of PCT levels below or equal to 80% defines a positive change in PCT test result representing a higher risk for 28-day " all-cause mortality of patients diagnosed with severe sepsis for septic shock.    Change in PCT >80%  A decrease of PCT levels of more than 80% defines a negative change in PCT result representing a lower risk for 28-day all-cause mortality of patients diagnosed with severe sepsis or septic shock.       T4, Free [[id number]]  (Normal) Collected: 04/11/23 1514    Specimen: Blood Updated: 04/11/23 1557     Free T4 1.06 ng/dL     Narrative:      Results may be falsely increased if patient taking Biotin.      Lipid Panel [[id number]]  (Abnormal) Collected: 04/11/23 1514    Specimen: Blood Updated: 04/11/23 1554     Total Cholesterol 162 mg/dL      Triglycerides 93 mg/dL      HDL Cholesterol 66 mg/dL      LDL Cholesterol  79 mg/dL      VLDL Cholesterol 17 mg/dL      LDL/HDL Ratio 1.17    Narrative:      Cholesterol Reference Ranges  (U.S. Department of Health and Human Services ATP III Classifications)    Desirable          <200 mg/dL  Borderline High    200-239 mg/dL  High Risk          >240 mg/dL      Triglyceride Reference Ranges  (U.S. Department of Health and Human Services ATP III Classifications)    Normal           <150 mg/dL  Borderline High  150-199 mg/dL  High             200-499 mg/dL  Very High        >500 mg/dL    HDL Reference Ranges  (U.S. Department of Health and Human Services ATP III Classifications)    Low     <40 mg/dl (major risk factor for CHD)  High    >60 mg/dl ('negative' risk factor for CHD)        LDL Reference Ranges  (U.S. Department of Health and Human Services ATP III Classifications)    Optimal          <100 mg/dL  Near Optimal     100-129 mg/dL  Borderline High  130-159 mg/dL  High             160-189 mg/dL  Very High        >189 mg/dL    Comprehensive Metabolic Panel [[id number]]  (Abnormal) Collected: 04/11/23 1514    Specimen: Blood Updated: 04/11/23 1552     Glucose 115 mg/dL      BUN 12 mg/dL      Creatinine 0.41 mg/dL      Sodium 139 mmol/L      Potassium 2.7 mmol/L      Chloride 98  mmol/L      CO2 31.0 mmol/L      Calcium 10.2 mg/dL      Total Protein 6.3 g/dL      Albumin 3.8 g/dL      ALT (SGPT) 215 U/L      AST (SGOT) 103 U/L      Alkaline Phosphatase 95 U/L      Total Bilirubin 0.6 mg/dL      Globulin 2.5 gm/dL      A/G Ratio 1.5 g/dL      BUN/Creatinine Ratio 29.3     Anion Gap 10.0 mmol/L      eGFR 108.0 mL/min/1.73     Narrative:      GFR Normal >60  Chronic Kidney Disease <60  Kidney Failure <15      CK [[id number]]  (Abnormal) Collected: 04/11/23 1514    Specimen: Blood Updated: 04/11/23 1552     Creatine Kinase 729 U/L     Magnesium [[id number]]  (Normal) Collected: 04/11/23 1514    Specimen: Blood Updated: 04/11/23 1547     Magnesium 1.7 mg/dL         Imaging Results (Last 72 Hours)     Procedure Component Value Units Date/Time    XR Chest 1 View [[id number]] Collected: 04/11/23 1438     Updated: 04/11/23 1445    Narrative:      Frontal upright radiograph of the chest 4/11/2023 2:29 PM CDT     HISTORY: Renal failure     COMPARISON: None.     FINDINGS:      There is a thin reticular opacities identified in both upper lobes,  likely fibrotic. No consolidation is seen. Lungs are well expanded. No  pleural effusion or pneumothorax. Heart size is normal. Pulmonary  vasculature are nondilated. No acute bony abnormality seen.       Impression:      1. There appear to be mild fibrotic changes in both upper lobes. No  superimposed acute process.        This report was finalized on 04/11/2023 14:42 by Dr Car Hernadez, .             ASSESSMENT/PLAN       Examination and evaluation of wound(s) was performed.    DIAGNOSIS:     Atrial fibrillation with rapid ventricular response    Multiple abrasions    Fall at home    At high risk for skin breakdown    PLAN:   Orders placed for wound care along with moisture and pressure management.  All orders listed below.          Ordered     Wound Care  Every 12 Hours        Question Answer Comment   Wound Locations Bilateral elbows and right knee    Wound  Care Instructions Clean with NS.  Apply Bactroban and cover with dry dressing.    Cleanse Normal Saline    Intervention Bactroban        04/12/23 0746     Wound Care  Daily      Question Answer Comment   Wound Locations Left anterior distal lower leg    Wound Care Instructions Clean with NS.  Apply opticell Ag to wound bed.  Cover with silicone border foam dressing.    Cleanse Normal Saline    Intervention Other    Other Opticell Ag    Dressing: Silicone Border Dressing        04/12/23 0748     Wound Care  Daily      Question Answer Comment   Wound Locations Right lateral ankle    Wound Care Instructions Clean with NS.  Apply silicone border foam dressing. Change daily.    Cleanse Normal Saline    Dressing: Silicone Border Dressing        04/12/23 0747     Elevate Heels Off of Bed  Until Discontinued         04/12/23 0746     Turn Patient  Now Then Every 2 Hours         04/12/23 0746     Use Seat Cushion When Up In Chair  Continuous         04/12/23 0746                 Discussed findings and treatment plan including risks, benefits, and treatment options with patient in detail. Patient agreed with treatment plan.        This document has been electronically signed by RASHI Quintero on 4/12/2023 07:43 CDT

## 2023-04-12 NOTE — DISCHARGE PLACEMENT REQUEST
"From:  Pilar Herrera, BSJOANNA  [phone]    Lilli Trivedi (67 y.o. Female)     Date of Birth   1955    Social Security Number       Address   [address] [place] [address] [place] [address]    Home Phone   [phone]    MRN   [id number]       Buddhism   Other    Marital Status                               Admission Date   4/11/23    Admission Type   Urgent    Admitting Provider   Carlos King MD    Attending Provider   Carlos King MD    Department, Room/Bed   [facility] 4B, 434/1       Discharge Date       Discharge Disposition       Discharge Destination                               Attending Provider: Carlos King MD    Allergies: Barley Grass, Broccoli [Brassica Oleracea], Cinnamon, Mustard, Peanut-containing Drug Products, Penicillins    Isolation: None   Infection: COVID (History) (04/11/23)   Code Status: CPR    Ht: 180.3 cm (71\")   Wt: 59.1 kg (130 lb 4.8 oz)    Admission Cmt: None   Principal Problem: Atrial fibrillation with rapid ventricular response [I48.91]                 Active Insurance as of 4/11/2023     Primary Coverage     Payor Plan Insurance Group Employer/Plan Group    MEDICARE MEDICARE A & B      Payor Plan Address Payor Plan Phone Number Payor Plan Fax Number Effective Dates    [address] [phone]  7/1/2020 - None Entered    [facility] [id number]       Subscriber Name Subscriber Birth Date Member ID       LILLI TRIVEDI 1955 [id number]           Secondary Coverage     Payor Plan Insurance Group Employer/Plan Group    AETNA COMMERCIAL AETNA  [employer] SUPP      Payor Plan Address Payor Plan Phone Number Payor Plan Fax Number Effective Dates    [address] [phone]  7/1/2020 - None Entered    [facility] [id number]       Subscriber Name Subscriber Birth Date Member ID       LILLI TRIVEDI 1955 [id number]                 Emergency Contacts      (Rel.) Home Phone Work Phone Mobile Phone    Em Mckinney " (Daughter) [phone] -- --               History & Physical      Carlos King MD at 04/11/23 1816              [facility] Medicine Services  HISTORY AND PHYSICAL    Date of Admission: 4/11/2023  Primary Care Physician: Vanessa Regalado APRN    Subjective   Primary Historian:  Patient  Chief Complaint: Transferred here because of poorly controlled heart rate in patient with atrial fibrillation (reportedly new diagnosis).  Family also requested the transfer.    History of Present Illness  Patient is a 67-year-old woman who on March 12 suffered the loss of a family member.  Approximately 2 weeks later went to sign legal papers and reportedly inhaled cinnamon burning in a candle.  She states allergy to cinnamon.  She had some reaction and went to her primary care provider.  She was prescribed Zithromax and prednisone.  She subsequently became weak to the point were she fell to the wall and stayed on the ground for couple of days.  When she regained some strength, she was able to crawl and make a phone call for help.  She was brought to the emergency room and was subsequently admitted with concern for rhabdomyolysis.  Her CPK reportedly 21,000 and troponin was 288.  She had CT of the chest which reportedly revealed pneumonia in the setting of elevated white count of 22,000.  Her initial EKG showed atrial fibrillation.  It is not known to her that she has prior atrial fibrillation.  She has no palpitation until she got bronchodilator treatment.  She was continued on Solu-Medrol while in the hospital  She received anticoagulation, antibiotic and was receiving Cardizem.  I am told that her heart rate had not been adequately controlled.  On transfer I was informed that her heart rate was 104 with blood pressure of 122/66 and SPO2 of 94%.  Echocardiogram showed LV cavity is normal in size.  Normal global wall motion.  EF is 60 to 65% with normal diastolic filling.   "Structurally trileaflet aortic valve with mild regurgitation, mild mitral regurgitation.  No evidence of pulmonary hypertension in the 3 cm diameter liver cyst reported.    On my interview with patient  She said that she has nonproductive coughing spell, wheezing.  She denies any fever chills shortness of breath or difficulty breathing.  She also told me that she had not urinated quite well until recently.    She told me on April 5 she lost her balance and has diminished muscle strength.  She is interested in undergoing physical rehabilitation.  She lives by herself.  She is concerned about her safety on her own at home    The review of diagnostic studies from outside hospital  White count 25.8 \"(in the setting of steroid use) hemoglobin 12.9, platelet count of 249, no evidence of left shift predominance of neutrophils at 65%  Potassium 2.8  When she came in she had a 2.8 potassium which went as high as 5.6 but subsequently went down to its level.  I am informed that it was replaced/supplemented  When she came in her white count was 22.7 but og to 25.2.  Once again this is in the setting of steroid use.  He may have been dehydrated also her hemoglobin is 15.4.  BUN/creatinine ratio was 46.3 on admit but has normal creatinine of 0.8 while BUN was 37  Urinalysis showed RBC of 11-20.    She has a negative MRSA nasal swab  She has had CT of the cervical spine, CT of the chest without contrast CT facial bones (noted that she has periorbital edema mostly in the right lower quadrant) I did not see any report of the CT imaging.  I am told though by our nurse that she has been found with nasal fracture, she has a soft cast on left upper extremity for wrist issue.  She has several Kerlix wrap bilateral knees and right distal leg      EKG reviewed showed atrial fibrillation on April 7.  On April 8 she was normal sinus rhythm with sinus arrhythmia.  On April 10 she had atrial fibrillation with rate of 123    I am told that " they have a cardiology nurse practitioner (Lissa Galaviz who normally comes once a week) apparently had not come.  Not sure whether she was scheduled to come.  Transferring nurse rudier requested transfer because of poorly controlled heart rate.  Family concur.  Heart rate when discussed with me was 103/104.  Rate seem to have been better controlled    When I saw her in the unit, she is running at  heart rate.  Telemetry showed    Overall patient expressed that she is doing better.      Review of Systems   Otherwise complete ROS reviewed and negative except as mentioned in the HPI.    Past Medical History:   Past Medical History:   Diagnosis Date   • Disease of thyroid gland      Past Surgical History:  Past Surgical History:   Procedure Laterality Date   • HYSTERECTOMY      with BSO   • TONSILLECTOMY       Social History:  reports that she has never smoked. She has never used smokeless tobacco. She reports current alcohol use. She reports that she does not use drugs.    Family History: family history includes Breast cancer (age of onset: 80) in her maternal grandmother; Lung cancer in her father; No Known Problems in her mother and sister; Prostate cancer (age of onset: 70) in her maternal grandfather; Prostate cancer (age of onset: 80) in her brother.      Allergies:  Allergies   Allergen Reactions   • Cinnamon Anaphylaxis   • Penicillins Shortness Of Breath   • Peanut-Containing Drug Products    • Barley Grass Rash   • Broccoli [Brassica Oleracea] Rash   • Mustard Rash     Sore throat        Medications:  Prior to Admission medications    Medication Sig Start Date End Date Taking? Authorizing Provider   b complex vitamins capsule Take 1 capsule by mouth Daily.   Yes Abbi De Jesus MD   calcium carbonate (OS-SHAISTA) 600 MG tablet Take 1 tablet by mouth Daily.   Yes Abbi De Jesus MD   Cholecalciferol (VITAMIN D3) 2000 units capsule Take 1 capsule by mouth Daily.   Yes Abbi De Jesus MD  "  guaiFENesin (Mucinex) 600 MG 12 hr tablet Take 2 tablets by mouth 2 (Two) Times a Day. 4/3/23  Yes Ave Canada APRN   MAGNESIUM PO Take 200 mg by mouth Daily.   Yes Provider, MD Abbi   potassium gluconate 595 (99 K) MG tablet tablet Take 1 tablet by mouth Daily.   Yes Provider, MD Abbi   thyroid (ARMOUR) 15 MG tablet Take 1 tablet by mouth Daily.   Yes Provider, MD Abbi   Zinc 50 MG tablet Take 1 tablet by mouth Daily.   Yes Provider, MD Abbi   azithromycin (Zithromax Z-Pardeep) 250 MG tablet Take 2 tablets the first day, then 1 tablet daily for 4 days.  Patient not taking: Reported on 4/11/2023 4/3/23   Ave Canada APRN   fluconazole (Diflucan) 150 MG tablet Day 1 take 1 tablet.  If no improvement in symptoms in 3 days, take the second tablet.  Patient not taking: Reported on 4/11/2023 2/16/23   Vanessa Regalado APRN   methylPREDNISolone (MEDROL) 4 MG dose pack Take as directed on package instructions.  Patient not taking: Reported on 4/11/2023 4/3/23   Ave Canada APRN     I have utilized all available immediate resources to obtain, update, or review the patient's current medications (including all prescriptions, over-the-counter products, herbals, cannabis/cannabidiol products, and vitamin/mineral/dietary (nutritional) supplements).    Objective     Vital Signs: /82   Pulse 89   Temp 98.3 °F (36.8 °C) (Oral)   Resp 11   Ht 180.3 cm (71\")   Wt 58.5 kg (129 lb 0.4 oz)   LMP 06/28/2001 (Exact Date)   SpO2 95%   BMI 18.00 kg/m²   Physical Exam   GEN: Awake, alert, interactive, in NAD  HEENT: Atraumatic, PERRLA, EOMI, Anicteric, Trachea midline  Lungs: CTAB, no wheezing/rales/rhonchi  Heart: RRR, +S1/s2, no rub  ABD: soft, nt/nd, +BS, no guarding/rebound  Extremities: atraumatic, no cyanosis, no edema  Skin: no rashes or lesions (patient has multiple dressing on bilateral knees, distal right leg, right elbow and has a soft cast " on left upper extremity)  She also has contusion right inferior portion of orbit    Neuro: AAOx3, no focal deficits  Psych: normal mood & affect        Results Reviewed:  Lab Results (last 24 hours)     ** No results found for the last 24 hours. **        Imaging Results (Last 24 Hours)     Procedure Component Value Units Date/Time    XR Chest 1 View [[id number]] Resulted: 04/11/23 1429     Updated: 04/11/23 1429        I have personally reviewed and interpreted the radiology studies and ECG obtained at time of admission.     Assessment / Plan   Assessment:   There are no active hospital problems to display for this patient.      Medical Decision Making  Number and Complexity of problems:  · Atrial fibrillation new onset.  Rate now controlled or at least better controlled  · Fall secondary to generalized weakness  · Rhabdomyolysis-nontraumatic.  Historically been on the floor for couple of days.  · Bilateral nasal fracture  · Decreased urine output.  Normal creatinine  · Hypokalemia  · Being treated for pneumonia prior to this admission.      Treatment Plan  · The patient will be admitted to my service here at [facility].  · I do not think patient needs bronchodilator treatment at this time.  I also do not think that she needs Solu-Medrol.  · I will order for chest x-ray  · EKG reviewed showed atrial fibrillation rate controlled  · Recheck chemistries, CPK, CBC  · Have asked the nurse to check prior use of anticoagulation from transferring facility.  I also would like to asked the nurse to find out head CT report and other imaging study prior to starting Eliquis.  · We will check with pharmacist regarding appropriate dosing of Eliquis.  Her weight is at the borderline cutoff of 60.  Creatinine not yet available but normal from prior check at outside hospital.  Her age is only 62.  She might still be at the 5 mg twice daily dose.  · We will recheck potassium and placed on replacement protocol.  We will  also check magnesium.  · At this point I do not see any particular need for hydration but it will all depend on her CPK, renal function.  · She is generally weak.  There is nothing to suggest that this was a stroke event.  Will request PT OT to evaluate.  · She is concerned about living by herself.  She is interested to know her functional capacity and open for placement if needed.  · Patient is hemodynamically stable and appropriate to be transferred to telemetry floor.  · We will obtain medication list and resume accordingly.  · We will obtain culture report from outside hospital, will resume Levaquin.  · We will start on beta-blocker 25 mg twice daily of Lopressor.  We will consider putting him back on Cardizem drip on as-needed basis    Conditions and Status        Stable  MDM Data  External documents reviewed: Reviewed   cardiac tracing (EKG, telemetry) interpretation: Reviewed  Radiology interpretation: None available but reported to have had left metacarpal fracture of the fourth and fifth.    Labs reviewed: Yes  Any tests that were considered but not ordered: None     Decision rules/scores evaluated (example [id number], Wells, etc):   We will calculate CHADS2 vasc 2  No history of bleeding      Discussed with: Patient and sisters     Care Planning  Shared decision making: Patient and sisters   code status and discussions: Full code  Disposition  Social Determinants of Health that impact treatment or disposition: None at this time  Estimated length of stay is to be determined    I confirmed that the patient's advanced care plan is present, code status is documented, and a surrogate decision maker is listed in the patient's medical record.     The patient's surrogate decision maker is sister Henrik.    The patient was seen and examined by me on   Electronically signed by Carlos King MD, 04/11/23, 14:35 CDT.        Made available and showed no acute process in the brain  Bilateral nasal  fracture  Degenerative changes on CT cervical spine  Progress note indicates fourth and fifth left metacarpal fracture.  I am told that she should follow-up with Ortho and the swelling subside.      Electronically signed by Carlos King MD, 04/11/23, 3:26 PM CDT.                     Electronically signed by Carlos King MD at 04/11/23 1526       Operative/Procedure Notes (most recent note)    No notes of this type exist for this encounter.            Physician Progress Notes (most recent note)      Jennifer Nelson APRN at 04/12/23 1345              [facility] Medicine Services  INPATIENT PROGRESS NOTE    Patient Name: Lilli Trivedi  Date of Admission: 4/11/2023  Today's Date: 04/12/23  Length of Stay: 1  Primary Care Physician: Vanessa Regalado APRN    Subjective   Chief Complaint: Follow-up atrial fibrillation  HPI   Ms. Trivedi is a 67-year-old female that was transferred from  due to atrial fibrillation with poorly controlled heart rate.  Recently was seen at [facility] primary care on 4/3/2023 and was diagnosed with bronchitis.  She was started on Mucinex, Z-Pardeep and Medrol Dosepak.  She was admitted to  on 4/7 with pneumonia and rhabdomyolysis.  She lives home alone.  Reportedly had a fall in which she stayed on the ground for 2 days until she was able to make a phone call for help.  She did hit her face when she fell and left upper extremity.  At [facility] emergency department, CPK reportedly 21,424 and troponin was 288. WBC 22,000.  CT of the chest revealed pulmonary contusion and pneumonia for which she was getting Levaquin and vancomycin.  She was getting IV Solu-Medrol.  She was also found to have fracture to left upper extremity and has a soft cast in place in addition to nasal fracture.  Initial EKG showed atrial fibrillation no acute T-T wave changes. She had received  Cardizem boluses and p.o. Cardizem however continued to have heart rate up to 160s.  2D echocardiogram showed normal ejection fraction.  No significant valvular pathology. Facility was unable to initiate Cardizem drip due to unit capabilities.  Family and patient requested transfer.    Sitting up in chair.  Remains in atrial flutter, low 100s up to 120 on telemetry.  No chest pain or shortness of breath.  On room air.  Occasional productive cough.   No fever.  No pain.  She was able to ambulate in the hallway with therapy.    Review of Systems   All pertinent negatives and positives are as above. All other systems have been reviewed and are negative unless otherwise stated.     Objective    Temp:  [97.2 °F (36.2 °C)-98.1 °F (36.7 °C)] 97.2 °F (36.2 °C)  Heart Rate:  [] 102  Resp:  [16] 16  BP: (122-137)/(65-89) 134/75  Physical Exam  Vitals reviewed.   Constitutional:       General: She is not in acute distress.     Appearance: She is not toxic-appearing.      Comments: Up in chair.  No acute distress.  On room air.  No family at bedside.  Bruising under right eye.  Discussed with her nurse Dana.   HENT:      Head: Normocephalic and atraumatic.      Mouth/Throat:      Mouth: Mucous membranes are moist.      Pharynx: Oropharynx is clear.   Eyes:      Extraocular Movements: Extraocular movements intact.      Conjunctiva/sclera: Conjunctivae normal.      Pupils: Pupils are equal, round, and reactive to light.   Cardiovascular:      Rate and Rhythm: Tachycardia present. Rhythm irregular.      Pulses: Normal pulses.      Comments: Atrial flutter, 86 up to 121  Pulmonary:      Effort: Pulmonary effort is normal. No respiratory distress.      Breath sounds: Rhonchi present.   Abdominal:      General: Bowel sounds are normal. There is no distension.      Palpations: Abdomen is soft.      Tenderness: There is no abdominal tenderness.   Musculoskeletal:         General: No swelling or tenderness. Normal range of  motion.      Cervical back: Normal range of motion and neck supple. No muscular tenderness.   Skin:     General: Skin is warm and dry.      Findings: No erythema or rash.      Comments: Multiple abrasions with dressings in place.  Cast to left upper extremity.   Neurological:      General: No focal deficit present.      Mental Status: She is alert and oriented to person, place, and time.      Cranial Nerves: No cranial nerve deficit.      Motor: No weakness.   Psychiatric:         Mood and Affect: Mood normal.         Behavior: Behavior normal.       Results Review:  I have reviewed the labs, radiology results, and diagnostic studies.    Laboratory Data:   Results from last 7 days   Lab Units 04/12/23  1338 04/11/23  1514   WBC 10*3/mm3 31.50* 27.13*   HEMOGLOBIN g/dL 13.4 13.7   HEMATOCRIT % 41.0 40.2   PLATELETS 10*3/mm3 316 275        Results from last 7 days   Lab Units 04/12/23  1338 04/12/23  0309 04/11/23  1514   SODIUM mmol/L 136  --  139   POTASSIUM mmol/L 4.2 4.4 2.7*   CHLORIDE mmol/L 100  --  98   CO2 mmol/L 26.0  --  31.0*   BUN mg/dL 26*  --  12   CREATININE mg/dL 0.52*  --  0.41*   CALCIUM mg/dL 10.6*  --  10.2   BILIRUBIN mg/dL 0.5  --  0.6   ALK PHOS U/L 89  --  95   ALT (SGPT) U/L 171*  --  215*   AST (SGOT) U/L 65*  --  103*   GLUCOSE mg/dL 130*  --  115*       Culture Data:   Microbiology Results (last 10 days)     ** No results found for the last 240 hours. **        Radiology Data:   Imaging Results (Last 24 Hours)     ** No results found for the last 24 hours. **          I have reviewed the patient's current medications.     Assessment/Plan   Assessment  Active Hospital Problems    Diagnosis    • **Atrial fibrillation with rapid ventricular response    • Hypokalemia    • Non-traumatic rhabdomyolysis    • Nasal fracture    • Transaminitis        Treatment Plan  Atrial fibrillation with rapid ventricular response.  Transthoracic echocardiogram at outlying facility showed LV cavity is normal in  size.  Normal global wall motion.  EF is 60 to 65% with normal diastolic filling.  Structurally trileaflet aortic valve with mild regurgitation, mild mitral regurgitation.  No evidence of pulmonary hypertension. On telemetry, heart rates 86 up to 120.  Metoprolol increased to 50 twice daily.  Continue Eliquis.  May need EP outpatient follow-up.    CT at [facility] showed pneumonia.  She was given vancomycin and Levaquin.  Dr. King continued her on Levaquin.  Chest x-ray on 4/11 showed mild fibrotic changes in both upper lobes.  No superimposed acute process.  On room air.  WBC 27 with follow-up 31.  Procalcitonin 0.07.    Fracture to left upper extremity.  States that she did not see an orthopedic physician at [facility] and was told to follow-up with orthopedics as outpatient.  Consult orthopedics to evaluate fracture to left upper extremity.    Nontraumatic rhabdomyolysis with reported CK 21,424 at outlying facility.  , 428.    Hypokalemic, 2.7 on admission.  Received potassium supplementation.  Follow-up 4.2.  Magnesium 2.6.    Unable to review medical information from [facility], no documentation in her chart on 4B and unable to find in the computer.  No CD available.  EZEKIEL Cortez trying to locate medical information that came with the patient upon transfer or may need to request information again.    Wound care consult.    Eliquis will serve as DVT prophylaxis.    PT/OT.    Medical Decision Making  Number and Complexity of problems: 4 acute problems in the form of atrial fibrillation with rapid ventricular response, possible pneumonia, fracture to left upper extremity, nontraumatic rhabdomyolysis  Differential Diagnosis: None considered at present    Conditions and Status        Condition is improving.     [facility] Data  External documents reviewed: Prior [facility] records  Cardiac tracing (EKG, telemetry) interpretation: Atrial flutter  Radiology interpretation: Interpreted by  radiology  Labs reviewed: As above  Any tests that were considered but not ordered: None considered at present     Decision rules/scores evaluated (example [id number], Wells, etc): None considered at present     Discussed with: Patient, Dana FALCON and Dr. King     Care Planning  Shared decision making: Patient is agreeable to ongoing work-up and treatment  Code status and discussions: Full code with full interventions    Disposition  Social Determinants of Health that impact treatment or disposition: She is interested in skilled nursing facility placement  I expect the patient to be discharged to skilled nursing facility in 1-2 days.     Electronically signed by RASHI Lynn, 23, 15:08 CDT.      Electronically signed by Jennifer Nelson APRN at 23 1511       Consult Notes (most recent note)    No notes of this type exist for this encounter.            Nutrition Notes (most recent note)      Waleska Ken RD at 23 1206        Goal Outcome Evaluation:              Outcome Evaluation: NTN assessment complete. Noted BMI 18.17 kg/m^2; but, weight has increased 10-15lb from last year. Pt has food allergies; these were updated in EMR to include reactions and severity. Pt does eat healthy; food allergies restrict comfort of eating outside of the home. Encouraged PO. Food preferences observed with daily menu selections. Will continue to follow per protocol.    Electronically signed by Waleska Ken RD at 23 1206       Physical Therapy Notes (most recent note)    No notes exist for this encounter.            Occupational Therapy Notes (most recent note)      Vanessa Leger OTR/L, CSRS at 23 1510          Patient Name: Lilli Trivedi  : 1955    MRN: [id number]                              Today's Date: 2023       Admit Date: 2023    Visit Dx:     ICD-10-CM ICD-9-CM   1. Decreased activities of daily living (ADL)  Z78.9 V49.89     Patient Active Problem List    Diagnosis   • Seasonal allergic rhinitis   • Atrial fibrillation with rapid ventricular response   • Hypokalemia   • Non-traumatic rhabdomyolysis   • Nasal fracture   • Transaminitis     Past Medical History:   Diagnosis Date   • Disease of thyroid gland      Past Surgical History:   Procedure Laterality Date   • HYSTERECTOMY      with BSO   • TONSILLECTOMY        General Information     Row Name 04/12/23 1256          OT Time and Intention    Document Type evaluation  t/f from [facility] with a-fib and RVR, pnuemonia, Rhabdomyolysis-nontraumatic.  Recent fx of L UE - NWB in soft cast/splint  -     Mode of Treatment occupational therapy  -     Row Name 04/12/23 1256          General Information    Patient Profile Reviewed yes  -JJ     Prior Level of Function independent:;all household mobility;transfer;bed mobility;ADL's  -     Existing Precautions/Restrictions fall  -     Barriers to Rehab previous functional deficit  -     Row Name 04/12/23 1256          Living Environment    People in Home alone  -     Row Name 04/12/23 1256          Home Main Entrance    Number of Stairs, Main Entrance other (see comments)  ramp  -J     Stair Railings, Main Entrance railings on both sides of stairs  -     Row Name 04/12/23 1256          Stairs Within Home, Primary    Number of Stairs, Within Home, Primary none  -J     Stair Railings, Within Home, Primary none  -J     Row Name 04/12/23 1256          Cognition    Orientation Status (Cognition) oriented x 4  -     Row Name 04/12/23 1256          Safety Issues, Functional Mobility    Impairments Affecting Function (Mobility) balance;endurance/activity tolerance;strength  -           User Key  (r) = Recorded By, (t) = Taken By, (c) = Cosigned By    Initials Name Provider Type    Vanessa Syed, DESTINEY/L, CSRS Occupational Therapist                 Mobility/ADL's     Row Name 04/12/23 1256          Bed Mobility    Comment, (Bed Mobility) up in chair  -     Row  Name 04/12/23 1256          Transfers    Transfers sit-stand transfer;stand-sit transfer  -     Row Name 04/12/23 1256          Sit-Stand Transfer    Sit-Stand [place] (Transfers) minimum assist (75% patient effort);moderate assist (50% patient effort)  -     Row Name 04/12/23 1256          Stand-Sit Transfer    Stand-Sit [place] (Transfers) minimum assist (75% patient effort)  -     Row Name 04/12/23 1256          Functional Mobility    Functional Mobility- Ind. Level minimum assist (75% patient effort)  -     Functional Mobility- Device --  -     Functional Mobility- Comment amb in hallway.  LOB x1 requiring Min/Mod A.  -     Row Name 04/12/23 1256          Activities of Daily Living    BADL Assessment/Intervention lower body dressing  -[facility] Name 04/12/23 1256          Mobility    Extremity Weight-bearing Status left upper extremity  -     Left Upper Extremity (Weight-bearing Status) non weight-bearing (NWB)  -     Row Name 04/12/23 1256          Lower Body Dressing Assessment/Training    [place] Level (Lower Body Dressing) don;socks;moderate assist (50% patient effort)  -     Position (Lower Body Dressing) unsupported sitting  -           User Key  (r) = Recorded By, (t) = Taken By, (c) = Cosigned By    Initials Name Provider Type    Vanessa Hutton, DAER/L, CSRS Occupational Therapist               Obj/Interventions     Row Name 04/12/23 1256          Sensory Assessment (Somatosensory)    Sensory Assessment (Somatosensory) UE sensation intact  -     Row Name 04/12/23 1256          Vision Assessment/Intervention    Visual Impairment/Limitations WFL  -     Row Name 04/12/23 1256          Range of Motion Comprehensive    General Range of Motion bilateral upper extremity ROM [facility]     Comment, General Range of Motion except for L hand/wrist - casted from recent fx.  -     Row Name 04/12/23 1256          Strength Comprehensive (MMT)    Comment, General Manual  Muscle Testing (MMT) Assessment BUE strength grossly 4/5  -JJ     Row Name 04/12/23 1256          Balance    Balance Assessment sitting static balance;sitting dynamic balance;standing dynamic balance;standing static balance  -JJ     Static Sitting Balance independent  -JJ     Dynamic Sitting Balance standby assist  -JJ     Position, Sitting Balance unsupported;supported;sitting in chair  -JJ     Static Standing Balance contact guard;minimal assist  -JJ     Dynamic Standing Balance minimal assist  -JJ     Position/Device Used, Standing Balance unsupported  -JJ           User Key  (r) = Recorded By, (t) = Taken By, (c) = Cosigned By    Initials Name Provider Type    Vanessa Hutton, OTR/L, CSRS Occupational Therapist               Goals/Plan     Row Name 04/12/23 1256          Dressing Goal 1 (OT)    Activity/Device (Dressing Goal 1, OT) lower body dressing  -JJ     Gay/Cues Needed (Dressing Goal 1, OT) standby assist  -JJ     Time Frame (Dressing Goal 1, OT) long term goal (LTG);by discharge  -JJ     Progress/Outcome (Dressing Goal 1, OT) new goal  -     Row Name 04/12/23 1256          Toileting Goal 1 (OT)    Activity/Device (Toileting Goal 1, OT) toileting skills, all  -JJ     [place] Level/Cues Needed (Toileting Goal 1, OT) independent  -JJ     Time Frame (Toileting Goal 1, OT) long term goal (LTG);by discharge  -JJ     Progress/Outcome (Toileting Goal 1, OT) new goal  -     Row Name 04/12/23 1256          Strength Goal 1 (OT)    Strength Goal 1 (OT) Pt will increase B UE strength to 4+/5 for improved independence with adls and mobility.  -JJ     Time Frame (Strength Goal 1, OT) long term goal (LTG);by discharge  -JJ     Progress/Outcome (Strength Goal 1, OT) new goal  -     Row Name 04/12/23 1256          Problem Specific Goal 1 (OT)    Problem Specific Goal 1 (OT) Pt will participate in 10 minutes of adl tasks in standing without LOB or rest break to increase her tolerance for activity.   -J     Time Frame (Problem Specific Goal 1, OT) long term goal (LTG);by discharge  -JJ     Progress/Outcome (Problem Specific Goal 1, OT) new goal  -JJ     Row Name 04/12/23 4645          Therapy Assessment/Plan (OT)    Planned Therapy Interventions (OT) activity tolerance training;BADL retraining;functional balance retraining;transfer/mobility retraining;strengthening exercise;occupation/activity based interventions;patient/caregiver education/training;adaptive equipment training  -           User Key  (r) = Recorded By, (t) = Taken By, (c) = Cosigned By    Initials Name Provider Type    Vanessa Hutton, OTR/L, CSRS Occupational Therapist               Clinical Impression     Row Name 04/12/23 1250          Pain Assessment    Additional Documentation Pain Scale: FACES Pre/Post-Treatment (Group)  -     Row Name 04/12/23 2553          Pain Scale: FACES Pre/Post-Treatment    Pain: FACES Scale, Pretreatment 2-->hurts little bit  -JJ     Pain Location - Side/Orientation Left  -JJ     Pain Location upper  -JJ     Pain Location - extremity  -J     Row Name 04/12/23 5052          Plan of Care Review    Plan of Care Reviewed With patient  -JJ     Outcome Evaluation OT eval completed. Pt presents alert and oriented x4, resting comfortably in chair. She has a soft cast/splint on L wrist/forearm, pt reports recent fall with subsequent fx. Treated NWB until further orders clarified by MD. She reports at baseline being independent with all adls and mobility, but has had a progressive decline and been unable to care for herself. Today she demonstrates decreased strength, balance, activity tolerance and ROM. She required Mod A for sit <> stand t/f from chair. Amb in hallway with Min A, one LOB requiring Min/Mod A to maintain upright position. She is at an increased risk for falls d/t balance and strength deficits. Recommend d/c to short term rehab prior to returning home.  -     Row Name 04/12/23 5035           Therapy Assessment/Plan (OT)    Rehab Potential (OT) good, to achieve stated therapy goals  -     Criteria for Skilled Therapeutic Interventions Met (OT) yes;skilled treatment is necessary  -     Therapy Frequency (OT) 5 times/wk  -     Predicted Duration of Therapy Intervention (OT) 10 days  -     Row Name 04/12/23 1256          Therapy Plan Review/Discharge Plan (OT)    Anticipated Discharge Disposition (OT) [facility] nursing facility  -     Row Name 04/12/23 1256          Positioning and Restraints    Pre-Treatment Position sitting in chair/recliner  -     Post Treatment Position chair  -JJ     In Chair notified nsg;sitting;call light within reach;encouraged to call for assist;with other staff  with PCT  -           User Key  (r) = Recorded By, (t) = Taken By, (c) = Cosigned By    Initials Name Provider Type     Vanessa Leger, DAER/L, CSRS Occupational Therapist               Outcome Measures     Row Name 04/12/23 0748          How much help from another person do you currently need...    Turning from your back to your side while in flat bed without using bedrails? 3  -AC     Moving from lying on back to sitting on the side of a flat bed without bedrails? 3  -AC     Moving to and from a bed to a chair (including a wheelchair)? 3  -AC     Standing up from a chair using your arms (e.g., wheelchair, bedside chair)? 3  -AC     Climbing 3-5 steps with a railing? 3  -AC     To walk in hospital room? 3  -AC     AM-PAC 6 Clicks Score (PT) 18  -AC     Highest level of mobility 6 --> Walked 10 steps or more  -           User Key  (r) = Recorded By, (t) = Taken By, (c) = Cosigned By    Initials Name Provider Type    Arun Barksdale, RN Registered Nurse                Occupational Therapy Education     Title: PT OT SLP Therapies (In Progress)     Topic: Occupational Therapy (In Progress)     Point: ADL training (Done)     Description:   Instruct learner(s) on proper safety adaptation and remediation  techniques during self care or transfers.   Instruct in proper use of assistive devices.              Learning Progress Summary           Patient Acceptance, E, VU by  at 4/12/2023 1333                   Point: Home exercise program (Not Started)     Description:   Instruct learner(s) on appropriate technique for monitoring, assisting and/or progressing therapeutic exercises/activities.              Learner Progress:  Not documented in this visit.          Point: Precautions (Done)     Description:   Instruct learner(s) on prescribed precautions during self-care and functional transfers.              Learning Progress Summary           Patient Acceptance, E, VU by FAROOQ at 4/12/2023 1333                   Point: Body mechanics (Not Started)     Description:   Instruct learner(s) on proper positioning and spine alignment during self-care, functional mobility activities and/or exercises.              Learner Progress:  Not documented in this visit.                      User Key     Initials Effective Dates Name Provider Type Discipline    FAROOQ 11/10/21 -  Vanessa Leger, DESTINEY/L, CSRS Occupational Therapist OT              OT Recommendation and Plan  Planned Therapy Interventions (OT): activity tolerance training, BADL retraining, functional balance retraining, transfer/mobility retraining, strengthening exercise, occupation/activity based interventions, patient/caregiver education/training, adaptive equipment training  Therapy Frequency (OT): 5 times/wk  Plan of Care Review  Plan of Care Reviewed With: patient  Outcome Evaluation: OT eval completed. Pt presents alert and oriented x4, resting comfortably in chair. She has a soft cast/splint on L wrist/forearm, pt reports recent fall with subsequent fx. Treated NWB until further orders clarified by MD. She reports at baseline being independent with all adls and mobility, but has had a progressive decline and been unable to care for herself. Today she demonstrates  decreased strength, balance, activity tolerance and ROM. She required Mod A for sit <> stand t/f from chair. Amb in hallway with Min A, one LOB requiring Min/Mod A to maintain upright position. She is at an increased risk for falls d/t balance and strength deficits. Recommend d/c to short term rehab prior to returning home.     Time Calculation:    Time Calculation- OT     Row Name 04/12/23 1256             Time Calculation- OT    OT Start Time 1256  -      OT Stop Time 1334  -      OT Time Calculation (min) 38 min  -      OT Received On 04/12/23  -BRYSON      OT Goal Re-Cert Due Date 04/22/23  -            User Key  (r) = Recorded By, (t) = Taken By, (c) = Cosigned By    Initials Name Provider Type    JJ Leger, Vanessa, OTR/L, CSRS Occupational Therapist              Therapy Charges for Today     Code Description Service Date Service Provider Modifiers Qty    07309965392 HC OT EVAL LOW COMPLEXITY 3 4/12/2023 Vanessa Leger OTR/L, CSRS GO 1               Vanessa Leger, OTR/L, CSRS  4/12/2023    Electronically signed by Vanessa Leger OTR/L, CSRS at 04/12/23 1511       Speech Language Pathology Notes (most recent note)    No notes exist for this encounter.         Respiratory Therapy Notes (most recent note)    No notes exist for this encounter.

## 2023-04-12 NOTE — PROGRESS NOTES
[facility] Medicine Services  INPATIENT PROGRESS NOTE    Patient Name: Lilli Trivedi  Date of Admission: 4/11/2023  Today's Date: 04/12/23  Length of Stay: 1  Primary Care Physician: Vanessa Regalado APRN    Subjective   Chief Complaint: Follow-up atrial fibrillation  HPI   Ms. Trivedi is a 67-year-old female that was transferred from [facility] due to atrial fibrillation with poorly controlled heart rate.  Recently was seen at [facility] primary care on 4/3/2023 and was diagnosed with bronchitis.  She was started on Mucinex, Z-Pardeep and Medrol Dosepak.  She was admitted to [facility] on 4/7 with pneumonia and rhabdomyolysis.  She lives home alone.  Reportedly had a fall in which she stayed on the ground for 2 days until she was able to make a phone call for help.  She did hit her face when she fell and left upper extremity.  At [facility] emergency department, CPK reportedly 21,424 and troponin was 288. WBC 22,000.  CT of the chest revealed pulmonary contusion and pneumonia for which she was getting Levaquin and vancomycin.  She was getting IV Solu-Medrol.  She was also found to have fracture to left upper extremity and has a soft cast in place in addition to nasal fracture.  Initial EKG showed atrial fibrillation no acute T-T wave changes. She had received Cardizem boluses and p.o. Cardizem however continued to have heart rate up to 160s.  2D echocardiogram showed normal ejection fraction.  No significant valvular pathology. Facility was unable to initiate Cardizem drip due to unit capabilities.  Family and patient requested transfer.    Sitting up in chair.  Remains in atrial flutter, low 100s up to 120 on telemetry.  No chest pain or shortness of breath.  On room air.  Occasional productive cough.   No fever.  No pain.  She was able to ambulate in the hallway with therapy.    Review of Systems   All pertinent negatives and  positives are as above. All other systems have been reviewed and are negative unless otherwise stated.     Objective    Temp:  [97.2 °F (36.2 °C)-98.1 °F (36.7 °C)] 97.2 °F (36.2 °C)  Heart Rate:  [] 102  Resp:  [16] 16  BP: (122-137)/(65-89) 134/75  Physical Exam  Vitals reviewed.   Constitutional:       General: She is not in acute distress.     Appearance: She is not toxic-appearing.      Comments: Up in chair.  No acute distress.  On room air.  No family at bedside.  Bruising under right eye.  Discussed with her nurse Dana.   HENT:      Head: Normocephalic and atraumatic.      Mouth/Throat:      Mouth: Mucous membranes are moist.      Pharynx: Oropharynx is clear.   Eyes:      Extraocular Movements: Extraocular movements intact.      Conjunctiva/sclera: Conjunctivae normal.      Pupils: Pupils are equal, round, and reactive to light.   Cardiovascular:      Rate and Rhythm: Tachycardia present. Rhythm irregular.      Pulses: Normal pulses.      Comments: Atrial flutter, 86 up to 121  Pulmonary:      Effort: Pulmonary effort is normal. No respiratory distress.      Breath sounds: Rhonchi present.   Abdominal:      General: Bowel sounds are normal. There is no distension.      Palpations: Abdomen is soft.      Tenderness: There is no abdominal tenderness.   Musculoskeletal:         General: No swelling or tenderness. Normal range of motion.      Cervical back: Normal range of motion and neck supple. No muscular tenderness.   Skin:     General: Skin is warm and dry.      Findings: No erythema or rash.      Comments: Multiple abrasions with dressings in place.  Cast to left upper extremity.   Neurological:      General: No focal deficit present.      Mental Status: She is alert and oriented to person, place, and time.      Cranial Nerves: No cranial nerve deficit.      Motor: No weakness.   Psychiatric:         Mood and Affect: Mood normal.         Behavior: Behavior normal.       Results Review:  I have  reviewed the labs, radiology results, and diagnostic studies.    Laboratory Data:   Results from last 7 days   Lab Units 04/12/23  1338 04/11/23  1514   WBC 10*3/mm3 31.50* 27.13*   HEMOGLOBIN g/dL 13.4 13.7   HEMATOCRIT % 41.0 40.2   PLATELETS 10*3/mm3 316 275        Results from last 7 days   Lab Units 04/12/23  1338 04/12/23  0309 04/11/23  1514   SODIUM mmol/L 136  --  139   POTASSIUM mmol/L 4.2 4.4 2.7*   CHLORIDE mmol/L 100  --  98   CO2 mmol/L 26.0  --  31.0*   BUN mg/dL 26*  --  12   CREATININE mg/dL 0.52*  --  0.41*   CALCIUM mg/dL 10.6*  --  10.2   BILIRUBIN mg/dL 0.5  --  0.6   ALK PHOS U/L 89  --  95   ALT (SGPT) U/L 171*  --  215*   AST (SGOT) U/L 65*  --  103*   GLUCOSE mg/dL 130*  --  115*       Culture Data:   Microbiology Results (last 10 days)     ** No results found for the last 240 hours. **        Radiology Data:   Imaging Results (Last 24 Hours)     ** No results found for the last 24 hours. **          I have reviewed the patient's current medications.     Assessment/Plan   Assessment  Active Hospital Problems    Diagnosis    • **Atrial fibrillation with rapid ventricular response    • Hypokalemia    • Non-traumatic rhabdomyolysis    • Nasal fracture    • Transaminitis        Treatment Plan  Atrial fibrillation with rapid ventricular response.  Transthoracic echocardiogram at outlying facility showed LV cavity is normal in size.  Normal global wall motion.  EF is 60 to 65% with normal diastolic filling.  Structurally trileaflet aortic valve with mild regurgitation, mild mitral regurgitation.  No evidence of pulmonary hypertension. On telemetry, heart rates 86 up to 120.  Metoprolol increased to 50 twice daily.  Continue Eliquis.  May need EP outpatient follow-up.    CT at [facility] showed pneumonia.  She was given vancomycin and Levaquin.  Dr. King continued her on Levaquin.  Chest x-ray on 4/11 showed mild fibrotic changes in both upper lobes.  No superimposed acute  process.  On room air.  WBC 27 with follow-up 31.  Procalcitonin 0.07.    Fracture to left upper extremity.  States that she did not see an orthopedic physician at [facility] and was told to follow-up with orthopedics as outpatient.  Consult orthopedics to evaluate fracture to left upper extremity.    Nontraumatic rhabdomyolysis with reported CK 21,424 at outlying facility.  , 428.    Hypokalemic, 2.7 on admission.  Received potassium supplementation.  Follow-up 4.2.  Magnesium 2.6.    Unable to review medical information from [facility], no documentation in her chart on 4B and unable to find in the computer.  No CD available.  EZEKIEL Cortez trying to locate medical information that came with the patient upon transfer or may need to request information again.    Wound care consult.    Eliquis will serve as DVT prophylaxis.    PT/OT.    Medical Decision Making  Number and Complexity of problems: 4 acute problems in the form of atrial fibrillation with rapid ventricular response, possible pneumonia, fracture to left upper extremity, nontraumatic rhabdomyolysis  Differential Diagnosis: None considered at present    Conditions and Status        Condition is improving.     [place] Data  External documents reviewed: Prior [facility] records  Cardiac tracing (EKG, telemetry) interpretation: Atrial flutter  Radiology interpretation: Interpreted by radiology  Labs reviewed: As above  Any tests that were considered but not ordered: None considered at present     Decision rules/scores evaluated (example [id number], [place], etc): None considered at present     Discussed with: Nayely, Dana FALCON and Dr. King     Care Planning  Shared decision making: Patient is agreeable to ongoing work-up and treatment  Code status and discussions: Full code with full interventions    Disposition  Social Determinants of Health that impact treatment or disposition: She is interested in skilled nursing facility placement  I  expect the patient to be discharged to skilled nursing facility in 1-2 days.     Electronically signed by RASHI Lynn, 04/12/23, 15:08 CDT.

## 2023-04-13 ENCOUNTER — APPOINTMENT (OUTPATIENT)
Dept: GENERAL RADIOLOGY | Facility: HOSPITAL | Age: 68
DRG: 308 | End: 2023-04-13
Payer: MEDICARE

## 2023-04-13 PROBLEM — S62.305A CLOSED DISPLACED FRACTURE OF FOURTH METACARPAL BONE OF LEFT HAND: Status: ACTIVE | Noted: 2023-04-13

## 2023-04-13 LAB
ANION GAP SERPL CALCULATED.3IONS-SCNC: 7 MMOL/L (ref 5–15)
ANISOCYTOSIS BLD QL: ABNORMAL
BUN SERPL-MCNC: 22 MG/DL (ref 8–23)
BUN/CREAT SERPL: 47.8 (ref 7–25)
BURR CELLS BLD QL SMEAR: ABNORMAL
CALCIUM SPEC-SCNC: 9.7 MG/DL (ref 8.6–10.5)
CHLORIDE SERPL-SCNC: 100 MMOL/L (ref 98–107)
CO2 SERPL-SCNC: 30 MMOL/L (ref 22–29)
CREAT SERPL-MCNC: 0.46 MG/DL (ref 0.57–1)
DEPRECATED RDW RBC AUTO: 45.8 FL (ref 37–54)
EGFRCR SERPLBLD CKD-EPI 2021: 105 ML/MIN/1.73
ERYTHROCYTE [DISTWIDTH] IN BLOOD BY AUTOMATED COUNT: 14.4 % (ref 12.3–15.4)
GLUCOSE SERPL-MCNC: 100 MG/DL (ref 65–99)
HCT VFR BLD AUTO: 41.6 % (ref 34–46.6)
HGB BLD-MCNC: 13.5 G/DL (ref 12–15.9)
LYMPHOCYTES # BLD MANUAL: 3.08 10*3/MM3 (ref 0.7–3.1)
LYMPHOCYTES NFR BLD MANUAL: 6.1 % (ref 5–12)
MCH RBC QN AUTO: 28.5 PG (ref 26.6–33)
MCHC RBC AUTO-ENTMCNC: 32.5 G/DL (ref 31.5–35.7)
MCV RBC AUTO: 87.8 FL (ref 79–97)
METAMYELOCYTES NFR BLD MANUAL: 1 % (ref 0–0)
MONOCYTES # BLD: 2.04 10*3/MM3 (ref 0.1–0.9)
MYELOCYTES NFR BLD MANUAL: 2 % (ref 0–0)
NEUTROPHILS # BLD AUTO: 27.31 10*3/MM3 (ref 1.7–7)
NEUTROPHILS NFR BLD MANUAL: 79.6 % (ref 42.7–76)
NEUTS BAND NFR BLD MANUAL: 2 % (ref 0–5)
NRBC SPEC MANUAL: 1 /100 WBC (ref 0–0.2)
PLAT MORPH BLD: NORMAL
PLATELET # BLD AUTO: 305 10*3/MM3 (ref 140–450)
PMV BLD AUTO: 9.3 FL (ref 6–12)
POIKILOCYTOSIS BLD QL SMEAR: ABNORMAL
POLYCHROMASIA BLD QL SMEAR: ABNORMAL
POTASSIUM SERPL-SCNC: 4 MMOL/L (ref 3.5–5.2)
RBC # BLD AUTO: 4.74 10*6/MM3 (ref 3.77–5.28)
SODIUM SERPL-SCNC: 137 MMOL/L (ref 136–145)
VARIANT LYMPHS NFR BLD MANUAL: 4.1 % (ref 19.6–45.3)
VARIANT LYMPHS NFR BLD MANUAL: 5.1 % (ref 0–5)
WBC MORPH BLD: NORMAL
WBC NRBC COR # BLD: 33.45 10*3/MM3 (ref 3.4–10.8)

## 2023-04-13 PROCEDURE — 85007 BL SMEAR W/DIFF WBC COUNT: CPT | Performed by: NURSE PRACTITIONER

## 2023-04-13 PROCEDURE — 85025 COMPLETE CBC W/AUTO DIFF WBC: CPT | Performed by: NURSE PRACTITIONER

## 2023-04-13 PROCEDURE — 85060 BLOOD SMEAR INTERPRETATION: CPT | Performed by: INTERNAL MEDICINE

## 2023-04-13 PROCEDURE — 97535 SELF CARE MNGMENT TRAINING: CPT | Performed by: OCCUPATIONAL THERAPIST

## 2023-04-13 PROCEDURE — 85007 BL SMEAR W/DIFF WBC COUNT: CPT | Performed by: INTERNAL MEDICINE

## 2023-04-13 PROCEDURE — 93010 ELECTROCARDIOGRAM REPORT: CPT | Performed by: INTERNAL MEDICINE

## 2023-04-13 PROCEDURE — 73130 X-RAY EXAM OF HAND: CPT

## 2023-04-13 PROCEDURE — 80048 BASIC METABOLIC PNL TOTAL CA: CPT | Performed by: NURSE PRACTITIONER

## 2023-04-13 PROCEDURE — 97162 PT EVAL MOD COMPLEX 30 MIN: CPT

## 2023-04-13 PROCEDURE — 99222 1ST HOSP IP/OBS MODERATE 55: CPT | Performed by: INTERNAL MEDICINE

## 2023-04-13 PROCEDURE — 93005 ELECTROCARDIOGRAM TRACING: CPT | Performed by: INTERNAL MEDICINE

## 2023-04-13 PROCEDURE — 25010000002 LEVOFLOXACIN PER 250 MG: Performed by: INTERNAL MEDICINE

## 2023-04-13 RX ORDER — FLUCONAZOLE 150 MG/1
150 TABLET ORAL ONCE
Status: COMPLETED | OUTPATIENT
Start: 2023-04-13 | End: 2023-04-13

## 2023-04-13 RX ADMIN — DOCUSATE SODIUM 50 MG AND SENNOSIDES 8.6 MG 2 TABLET: 8.6; 5 TABLET, FILM COATED ORAL at 09:57

## 2023-04-13 RX ADMIN — FLUCONAZOLE 150 MG: 150 TABLET ORAL at 09:46

## 2023-04-13 RX ADMIN — APIXABAN 5 MG: 5 TABLET, FILM COATED ORAL at 17:07

## 2023-04-13 RX ADMIN — METOPROLOL TARTRATE 50 MG: 50 TABLET, FILM COATED ORAL at 09:42

## 2023-04-13 RX ADMIN — APIXABAN 5 MG: 5 TABLET, FILM COATED ORAL at 05:25

## 2023-04-13 RX ADMIN — THYROID, PORCINE 15 MG: 15 TABLET ORAL at 09:44

## 2023-04-13 RX ADMIN — DOCUSATE SODIUM 50 MG AND SENNOSIDES 8.6 MG 2 TABLET: 8.6; 5 TABLET, FILM COATED ORAL at 21:50

## 2023-04-13 RX ADMIN — GUAIFENESIN 1200 MG: 600 TABLET, EXTENDED RELEASE ORAL at 09:43

## 2023-04-13 RX ADMIN — DILTIAZEM HYDROCHLORIDE 30 MG: 30 TABLET, FILM COATED ORAL at 17:07

## 2023-04-13 RX ADMIN — LEVOFLOXACIN 750 MG: 5 INJECTION, SOLUTION INTRAVENOUS at 17:07

## 2023-04-13 RX ADMIN — GUAIFENESIN 1200 MG: 600 TABLET, EXTENDED RELEASE ORAL at 21:50

## 2023-04-13 RX ADMIN — DILTIAZEM HYDROCHLORIDE 30 MG: 30 TABLET, FILM COATED ORAL at 12:45

## 2023-04-13 RX ADMIN — MUPIROCIN 1 APPLICATION: 20 OINTMENT TOPICAL at 09:46

## 2023-04-13 RX ADMIN — Medication 10 ML: at 21:50

## 2023-04-13 NOTE — PLAN OF CARE
"Goal Outcome Evaluation:  Plan of Care Reviewed With: patient           Outcome Evaluation: PT eval complete. Pt A&O x4 and sitting in bedside recliner, L wrist/hand in cast and NWB. MMT revealed strength deficits throughout BLE, pt reports that she is tender from fall and describes the feeling as muscle fatigue \"like after you work your muscles too hard\". Pt required Adria to perform sit to stand and was able to ambulate with her shoes on and CGA for 460 ft in the hallway with minor self-corrected LOB and 2 standing rest breaks. Pt demonstrates decreased muscle strength, impaired balance, and decreased gait speed and would benefit from skilled PT services. PT recommends HH upon d/c.  "

## 2023-04-13 NOTE — PLAN OF CARE
Goal Outcome Evaluation:  Plan of Care Reviewed With: patient        Progress: improving  Outcome Evaluation: OT tx completed. Pt presents alert and oriented x4, sitting up in chair and agreeable to therapy. Remains on L forearm/hand soft splint, NWB. She demonstrates improved balance, activity tolerance and strength this date. Able to complete sit <> stand t/f from chair and commode with Min A/CGA. Doffed socks with set-up, donned shoes with CGA and Max A to tie shoe laces. She amb in hallway with CGA, no LOB or overt sway noted. Tolerated approx 10 minutes of standing activity this date. Complete all aspects of toileting with SBA and able to wash hands sink side with CGA for dynamic standing balance tasks. Left sitting up in chair at end of session. Continue OT POC.

## 2023-04-13 NOTE — PROGRESS NOTES
[facility] Medicine Services  INPATIENT PROGRESS NOTE    Patient Name: Lilli Trivedi  Date of Admission: 4/11/2023  Today's Date: 04/13/23  Length of Stay: 2  Primary Care Physician: Vanessa Regalado APRN    Subjective   Chief Complaint: Follow-up atrial fibrillation  HPI   Ms. Trivedi is a 67-year-old female that was transferred from [facility] due to atrial fibrillation with poorly controlled heart rate.  Recently was seen at [facility] primary care on 4/3/2023 and was diagnosed with bronchitis.  She was started on Mucinex, Z-Pardeep and Medrol Dosepak.  She was admitted to [facility] on 4/7 with pneumonia and rhabdomyolysis.  She lives home alone.  Reportedly had a fall in which she stayed on the ground for 2 days until she was able to make a phone call for help.  She did hit her face when she fell and left upper extremity.  At [facility] emergency department, CPK reportedly 21,424 and troponin was 288. WBC 22,000.  CT of the chest revealed pulmonary contusion and pneumonia for which she was getting Levaquin and vancomycin.  She was getting IV Solu-Medrol.  She was also found to have fracture to left upper extremity and has a soft cast in place in addition to nasal fracture.  Initial EKG showed atrial fibrillation no acute T-T wave changes. She had received Cardizem boluses and p.o. Cardizem however continued to have heart rate up to 160s.  2D echocardiogram showed normal ejection fraction.  No significant valvular pathology. Facility was unable to initiate Cardizem drip due to unit capabilities.  Family and patient requested transfer.    Up in chair with daughter at bedside.  I saw her walking in the valladares with OT.  Heart rate remained low 100s.  Denies shortness of breath, chest pain or pressure.  On room air.  Daughter states she can tell her mom is more like herself.    Review of Systems   All pertinent negatives and  positives are as above. All other systems have been reviewed and are negative unless otherwise stated.     Objective    Temp:  [97.2 °F (36.2 °C)-98.4 °F (36.9 °C)] 98.1 °F (36.7 °C)  Heart Rate:  [] 102  Resp:  [16-18] 16  BP: (105-134)/(65-86) 105/65  Physical Exam  Vitals reviewed.   Constitutional:       General: She is not in acute distress.     Appearance: She is not toxic-appearing.      Comments: Up in chair.  No acute distress.  On room air.  Daughter at bedside.  Bruising under right eye.  Discussed with her nurse miles.   HENT:      Head: Normocephalic and atraumatic.      Mouth/Throat:      Mouth: Mucous membranes are moist.      Pharynx: Oropharynx is clear.   Eyes:      Extraocular Movements: Extraocular movements intact.      Conjunctiva/sclera: Conjunctivae normal.      Pupils: Pupils are equal, round, and reactive to light.   Cardiovascular:      Rate and Rhythm: Tachycardia present. Rhythm irregular.      Pulses: Normal pulses.      Comments: Atrial flutter,  up to 180s  Pulmonary:      Effort: Pulmonary effort is normal. No respiratory distress.   Abdominal:      General: Bowel sounds are normal. There is no distension.      Palpations: Abdomen is soft.      Tenderness: There is no abdominal tenderness.   Musculoskeletal:         General: No swelling or tenderness. Normal range of motion.      Cervical back: Normal range of motion and neck supple. No muscular tenderness.   Skin:     General: Skin is warm and dry.      Findings: No erythema or rash.      Comments: Multiple abrasions with dressings in place.  Cast to left upper extremity.   Neurological:      General: No focal deficit present.      Mental Status: She is alert and oriented to person, place, and time.      Cranial Nerves: No cranial nerve deficit.      Motor: No weakness.   Psychiatric:         Mood and Affect: Mood normal.         Behavior: Behavior normal.       Results Review:  I have reviewed the labs, radiology  results, and diagnostic studies.    Laboratory Data:   Results from last 7 days   Lab Units 04/13/23  0311 04/12/23  1338 04/11/23  1514   WBC 10*3/mm3 33.45* 31.50* 27.13*   HEMOGLOBIN g/dL 13.5 13.4 13.7   HEMATOCRIT % 41.6 41.0 40.2   PLATELETS 10*3/mm3 305 316 275        Results from last 7 days   Lab Units 04/13/23  0311 04/12/23  1338 04/12/23  0309 04/11/23  1514   SODIUM mmol/L 137 136  --  139   POTASSIUM mmol/L 4.0 4.2 4.4 2.7*   CHLORIDE mmol/L 100 100  --  98   CO2 mmol/L 30.0* 26.0  --  31.0*   BUN mg/dL 22 26*  --  12   CREATININE mg/dL 0.46* 0.52*  --  0.41*   CALCIUM mg/dL 9.7 10.6*  --  10.2   BILIRUBIN mg/dL  --  0.5  --  0.6   ALK PHOS U/L  --  89  --  95   ALT (SGPT) U/L  --  171*  --  215*   AST (SGOT) U/L  --  65*  --  103*   GLUCOSE mg/dL 100* 130*  --  115*       Culture Data:   Microbiology Results (last 10 days)     ** No results found for the last 240 hours. **        Radiology Data:   Imaging Results (Last 24 Hours)     ** No results found for the last 24 hours. **          I have reviewed the patient's current medications.     Assessment/Plan   Assessment  Active Hospital Problems    Diagnosis    • **Atrial fibrillation with rapid ventricular response    • Closed displaced fracture of fourth metacarpal bone of left hand    • Hypokalemia    • Non-traumatic rhabdomyolysis    • Nasal fracture    • Transaminitis        Treatment Plan  Atrial fibrillation with rapid ventricular response.  Transthoracic echocardiogram at outlying facility showed LV cavity is normal in size.  Normal global wall motion.  EF is 60 to 65% with normal diastolic filling.  Structurally trileaflet aortic valve with mild regurgitation, mild mitral regurgitation.  No evidence of pulmonary hypertension. On telemetry, heart rates  up to 180 with activity.  Add Cardizem 30 mg every 6 hours.  Continue metoprolol 50 twice daily.  Continue Eliquis.  Consult cardiology.    CT at [facility] showed pneumonia.   She was given vancomycin and Levaquin.  Dr. King continued her on Levaquin.  Chest x-ray on 4/11 showed mild fibrotic changes in both upper lobes.  No superimposed acute process.  On room air.  WBC 27 with follow-up 33 (in the setting of steroid use).  Procalcitonin 0.07.    Orthopedics consulted for closed displaced fracture of fourth metacarpal bone of left hand. States that she did not see an orthopedic physician at [facility] and was told to follow-up with orthopedics as outpatient.  Discussed with JOSELYN Cadena -x-ray left hand and likely outpatient follow-up.    Nontraumatic rhabdomyolysis with reported CK 21,424 at outlying facility.  , 428.    Hypokalemic, 2.7 on admission.  Received potassium supplementation.  Follow-up 4.0.  Magnesium 2.6.    Wound care consult.    Eliquis will serve as DVT prophylaxis.    PT/OT.    Medical Decision Making  Number and Complexity of problems: 4 acute problems in the form of atrial fibrillation with rapid ventricular response, possible pneumonia, fracture to left upper extremity, nontraumatic rhabdomyolysis  Differential Diagnosis: None considered at present    Conditions and Status        Condition is improving.     [facility] Data  External documents reviewed: Prior [facility] records  Cardiac tracing (EKG, telemetry) interpretation: Atrial flutter  Radiology interpretation: Interpreted by radiology  Labs reviewed: As above  Any tests that were considered but not ordered: None considered at present     Decision rules/scores evaluated (example [id number], Wells, etc): None considered at present     Discussed with: Patient, miles FALCON and Dr. King     Care Planning  Shared decision making: Patient is agreeable to ongoing work-up and treatment  Code status and discussions: Full code with full interventions    Disposition  Social Determinants of Health that impact treatment or disposition: She is interested in skilled nursing facility placement  I expect the  patient to be discharged to [facility] in 1-2 days.     Electronically signed by RASHI Lynn, 04/13/23, 13:05 CDT.

## 2023-04-13 NOTE — DISCHARGE PLACEMENT REQUEST
"Lilli Trivedi (67 y.o. Female)     Date of Birth   1955    Social Security Number       Address   [address] [place] [address] [place] [address]    Home Phone   [phone]    MRN   [id number]       Scientologist   Other    Marital Status                               Admission Date   4/11/23    Admission Type   Urgent    Admitting Provider   Carlos King MD    Attending Provider   Carlos King MD    Department, Room/Bed   [facility] 4B, 434/1       Discharge Date       Discharge Disposition       Discharge Destination                               Attending Provider: Carlos King MD    Allergies: Barley Grass, Broccoli [Brassica Oleracea], Cinnamon, Mustard, Peanut-containing Drug Products, Penicillins    Isolation: None   Infection: COVID (History) (04/11/23)   Code Status: CPR    Ht: 180.3 cm (71\")   Wt: 56.6 kg (124 lb 12.8 oz)    Admission Cmt: None   Principal Problem: Atrial fibrillation with rapid ventricular response [I48.91]                 Active Insurance as of 4/11/2023     Primary Coverage     Payor Plan Insurance Group Employer/Plan Group    MEDICARE MEDICARE A & B      Payor Plan Address Payor Plan Phone Number Payor Plan Fax Number Effective Dates    [address] [phone]  7/1/2020 - None Entered    [facility] [id number]       Subscriber Name Subscriber Birth Date Member ID       LILLI TRIVEDI 1955 [id number]           Secondary Coverage     Payor Plan Insurance Group Employer/Plan Group    AETNA COMMERCIAL AETNA  [employer] SUPP      Payor Plan Address Payor Plan Phone Number Payor Plan Fax Number Effective Dates    [address] [phone]  7/1/2020 - None Entered    [facility] [id number]       Subscriber Name Subscriber Birth Date Member ID       LILLI TRIVEDI 1955 [id number]                 Emergency Contacts      (Rel.) Home Phone Work Phone Mobile Phone    Em Mckinney (Daughter) [phone] -- --        for " Lilli Trivedi as of 04/13/23 0905  Legend:                          Inactive     Active     Linked                 Medications 04/13/23 04/14/23 04/15/23 04/16/23 04/17/23 04/18/23 04/19/23   apixaban (ELIQUIS) tablet 5 mg  Dose: 5 mg  Freq: Every 12 Hours Scheduled Route: PO  Indications Comment: Atrial Fibrillation  Start: 04/11/23 1800   Admin Instructions:   Tablet may be crushed and suspended in 60 mL of water or D5W and immediately delivered via NG tube.    17817     1800      0600     1800      0600     1800      0600     1800      0600     1800      0600     1800      0600     1800        fluconazole (DIFLUCAN) tablet 150 mg  Dose: 150 mg  Freq: Once Route: PO  Indications of Use: VULVOVAGINAL CANDIDIASIS  Start: 04/13/23 0845   Admin Instructions:   Group 2 (Pink) Hazardous Drug - Reproductive Risk Only - See Handling Guide    0845              guaiFENesin (MUCINEX) 12 hr tablet 1,200 mg  Dose: 1,200 mg  Freq: Every 12 Hours Scheduled Route: PO  Start: 04/12/23 2100   Admin Instructions:   Caution: Look alike/sound alike drug alert               Do not crush, split, or chew.    0900 2100 0900 2100      0900 2100 0900 2100 0900 2100 0900 2100 0900 2100        levoFLOXacin (LEVAQUIN) 750 mg/150 mL D5W (premix) (LEVAQUIN) 750 mg  Dose: 750 mg  Freq: Every 24 Hours Route: IV  Indications Comment: Reported pneumonia from outside hospital  Start: 04/11/23 1600 End: 04/14/23 1559   Admin Instructions:   Caution: Look alike/sound alike drug alert. Protect from light. Do NOT refrigerate.    1600      1559-D/C'd           metoprolol tartrate (LOPRESSOR) tablet 50 mg  Dose: 50 mg  Freq: Every 12 Hours Scheduled Route: PO  Start: 04/12/23 2100 0900 2100      0900 2100 0900 2100 0900 2100 0900 2100 0900 2100 0900 2100        mupirocin (BACTROBAN) 2 % ointment 1 application  Dose: 1 application  Freq: Every 12  Hours Scheduled Route: TOP  Start: 04/12/23 2215   Admin Instructions:   Apply to wound topically as directed.      0900 2100 0900 2100 0900 2100 0900 2100 0900 2100 0900 2100 0900 2100         sennosides-docusate (PERICOLACE) 8.6-50 MG per tablet 2 tablet  Dose: 2 tablet  Freq: 2 Times Daily Route: PO  Start: 04/11/23 2100   Admin Instructions:   HOLD MEDICATION IF PATIENT HAS HAD BOWEL MOVEMENT. Start bowel management regimen if patient has not had a bowel movement after 12 hours.    0900 2100 0900 2100 0900 2100 0900 2100 0900 2100 0900 2100 0900 2100        And  polyethylene glycol (MIRALAX) packet 17 g  Dose: 17 g  Freq: Daily PRN Route: PO  PRN Reason: Constipation  PRN Comment: Use if senna-docusate is ineffective  Start: 04/11/23 1457   Admin Instructions:   Use if no bowel movement after 12 hours. Mix in 6-8 ounces of water.  Use 4-8 ounces of water, tea, or juice for each 17 gram dose.             And  bisacodyl (DULCOLAX) EC tablet 5 mg  Dose: 5 mg  Freq: Daily PRN Route: PO  PRN Reason: Constipation  PRN Comment: Use if polyethylene glycol is ineffective  Start: 04/11/23 1457   Admin Instructions:   Use if no bowel movement after 12 hours.  Swallow whole. Do not crush, split, or chew tablet.             And  bisacodyl (DULCOLAX) suppository 10 mg  Dose: 10 mg  Freq: Daily PRN Route: RE  PRN Reason: Constipation  PRN Comment: Use if bisacodyl oral is ineffective  Start: 04/11/23 1457   Admin Instructions:   Use if no bowel movement after 12 hours.  Hold for diarrhea             sodium chloride 0.9 % flush 10 mL  Dose: 10 mL  Freq: Every 12 Hours Scheduled Route: IV  Start: 04/11/23 2100 0900 2100 0900 2100 0900 2100 0900 2100 0900 2100 0900 2100 0900 2100        thyroid (ARMOUR) tablet 15 mg  Dose: 15 mg  Freq: Daily Route:  PO  Start: 04/12/23 1600    0900      0900      0900      0900      0900      0900      0900        Medications 04/13/23 04/14/23 04/15/23 04/16/23 04/17/23 04/18/23 04/19/23   Administration Detail   Ref # Action Time Dose Rate Route Site Duration Comments / Reason User   1 Given 04/13/23 0525 5 mg   Oral      Fe Mckinley                               Continuous Meds Sorted by Name  for Sanjay Trivedimanav CEDENO as of 04/13/23 0905  Legend:                          Inactive     Active     Linked                 Medications 04/13/23 04/14/23 04/15/23 04/16/23 04/17/23 04/18/23 04/19/23   Pharmacy to Dose Eliquis  Freq: Continuous PRN Route: XX  PRN Reason: Consult  Start: 04/11/23 1454   Order specific questions:   Pharmacy to Dose: Eliquis  Indication of Use Atrial fibrillation             Administration Detail   ** No Administration Information **         PRN Meds Sorted by Name  for Farooq Lilli CEDENO as of 04/13/23 0905  Legend:                          Inactive     Active     Linked                 Medications 04/13/23 04/14/23 04/15/23 04/16/23 04/17/23 04/18/23 04/19/23    sennosides-docusate (PERICOLACE) 8.6-50 MG per tablet 2 tablet  Dose: 2 tablet  Freq: 2 Times Daily Route: PO  Start: 04/11/23 2100   Admin Instructions:   HOLD MEDICATION IF PATIENT HAS HAD BOWEL MOVEMENT. Start bowel management regimen if patient has not had a bowel movement after 12 hours.    0900     2100      0900     2100      0900 2100 0900 2100 0900 2100 0900 2100 0900     2100        And  polyethylene glycol (MIRALAX) packet 17 g  Dose: 17 g  Freq: Daily PRN Route: PO  PRN Reason: Constipation  PRN Comment: Use if senna-docusate is ineffective  Start: 04/11/23 1457   Admin Instructions:   Use if no bowel movement after 12 hours. Mix in 6-8 ounces of water.  Use 4-8 ounces of water, tea, or juice for each 17 gram dose.             And  bisacodyl (DULCOLAX) EC tablet 5 mg  Dose: 5 mg  Freq: Daily PRN Route:  PO  PRN Reason: Constipation  PRN Comment: Use if polyethylene glycol is ineffective  Start: 04/11/23 1457   Admin Instructions:   Use if no bowel movement after 12 hours.  Swallow whole. Do not crush, split, or chew tablet.             And  bisacodyl (DULCOLAX) suppository 10 mg  Dose: 10 mg  Freq: Daily PRN Route: RE  PRN Reason: Constipation  PRN Comment: Use if bisacodyl oral is ineffective  Start: 04/11/23 1457   Admin Instructions:   Use if no bowel movement after 12 hours.  Hold for diarrhea              Magnesium Sulfate - Total Dose 10 grams - Magnesium 1 or Less  Dose: 2 g  Freq: As Needed Route: IV  PRN Comment: See Administration Instructions  Indications of Use: HYPOMAGNESEMIA  Start: 04/11/23 1457   Admin Instructions:   Magnesium 1 or Less  Administer 2 grams over 30 minutes, Followed By 2 grams over 2 Hours x4 Doses (Total Dose 10 grams)  Recheck Magnesium in AM             Or  Magnesium Sulfate - Total Dose 6 grams - Magnesium 1.1 - 1.5  Dose: 2 g  Freq: As Needed Route: IV  PRN Comment: See Administration Instructions  Start: 04/11/23 1457   Admin Instructions:   Mg 1.1 -1.5  Administer 2 grams over 2 Hours x3 Doses (Total Dose 6 grams)  Recheck Magnesium in AM             Or  Magnesium Sulfate - Total Dose 4 grams - Magnesium 1.6 - 1.9  Dose: 4 g  Freq: As Needed Route: IV  PRN Comment: See Administration Instructions  Start: 04/11/23 1457   Admin Instructions:   Mg 1.6 - 1.9  Administer 4 grams over 4 Hours x1 Dose  Recheck Magnesium in AM             ondansetron (ZOFRAN) injection 4 mg  Dose: 4 mg  Freq: Every 6 Hours PRN Route: IV  PRN Reasons: Nausea,Vomiting  Start: 04/11/23 1457   Admin Instructions:   If BOTH ondansetron (ZOFRAN) and promethazine (PHENERGAN) are ordered use ondansetron first and THEN promethazine IF ondansetron is ineffective.             Pharmacy to Dose Eliquis  Freq: Continuous PRN Route: XX  PRN Reason: Consult  Start: 04/11/23 1454   Order specific questions:    Pharmacy to Dose: [facility]  Indication of Use Atrial fibrillation             potassium chloride (KLOR-CON) packet 40 mEq  Dose: 40 mEq  Freq: As Needed Route: PO  PRN Comment: Potassium Replacement, See Admin Instructions  Start: 04/11/23 1457   Admin Instructions:   Potassium 3.1 or Less Give KCl 40 mEq q4h x3 Doses   Potassium 3.2 - 3.6 Give KCl 40 mEq q4h x2 Doses     Check Potassium 4 Hours After Last Dose Given   Check Magnesium if Potassium Level Remains Low After Replacement   DO NOT GIVE if CrCl is Less Than 30 mL/minute or Urine Output Less Than 30 mL/hr             potassium chloride (MICRO-K) CR capsule 40 mEq  Dose: 40 mEq  Freq: As Needed Route: PO  PRN Comment: Potassium Replacement.  See Admin Instructions  Start: 04/11/23 1457   Admin Instructions:   Potassium 3.1 or Less Give KCl 40 mEq q4h x3 Doses   Potassium 3.2 - 3.6 Give KCl 40 mEq q4h x2 Doses     Check Potassium 4 Hours After Last Dose Given   Check Magnesium if Potassium Level Remains Low After Replacement   DO NOT GIVE if CrCl is Less Than 30 mL/minute or Urine Output Less Than 30 mL/hr             sodium chloride 0.9 % flush 10 mL  Dose: 10 mL  Freq: As Needed Route: IV  PRN Reason: Line Care  Start: 04/11/23 1451             sodium chloride 0.9 % infusion 40 mL  Dose: 40 mL  Freq: As Needed Route: IV  PRN Reason: Line Care  Start: 04/11/23 1451   Admin Instructions:   Following administration of an IV intermittent medication, flush line with 40mL NS at 100mL/hr.             Medications 04/13/23 04/14/23 04/15/23 04/16/23 04/17/23 04/18/23 04/19/23   Administration Detail   ** No Administration Information **

## 2023-04-13 NOTE — THERAPY TREATMENT NOTE
Patient Name: Lilli Trivedi  : 1955    MRN: [id number]                              Today's Date: 2023       Admit Date: 2023    Visit Dx:     ICD-10-CM ICD-9-CM   1. Decreased activities of daily living (ADL)  Z78.9 V49.89     Patient Active Problem List   Diagnosis   • Seasonal allergic rhinitis   • Atrial fibrillation with rapid ventricular response   • Hypokalemia   • Non-traumatic rhabdomyolysis   • Nasal fracture   • Transaminitis   • Closed displaced fracture of fourth metacarpal bone of left hand     Past Medical History:   Diagnosis Date   • Disease of thyroid gland      Past Surgical History:   Procedure Laterality Date   • HYSTERECTOMY      with BSO   • TONSILLECTOMY        General Information     Row Name 23 1308          OT Time and Intention    Document Type therapy note (daily note)  -     Mode of Treatment occupational therapy  -     Row Name 23 1308          General Information    Patient Profile Reviewed yes  -     Prior Level of Function independent:;all household mobility;transfer;ADL's;bed mobility  -     Existing Precautions/Restrictions fall;oxygen therapy device and L/min  -     Barriers to Rehab previous functional deficit  -     Row Name 23 1308          Living Environment    People in Home alone  -     Row Name 23 1308          Cognition    Orientation Status (Cognition) oriented x 4  -     Row Name 23 1308          Safety Issues, Functional Mobility    Impairments Affecting Function (Mobility) balance;endurance/activity tolerance;strength  -           User Key  (r) = Recorded By, (t) = Taken By, (c) = Cosigned By    Initials Name Provider Type    Vanessa Hutton OTR/L, CSRS Occupational Therapist                 Mobility/ADL's     Row Name 23 1308          Transfers    Transfers sit-stand transfer;stand-sit transfer  -     Row Name 23 1308          Sit-Stand Transfer    Sit-Stand [place] (Transfers)  minimum assist (75% patient effort)  -[facility] Name 04/13/23 1308          Stand-Sit Transfer    Stand-Sit [place] (Transfers) minimum assist (75% patient effort)  -JJ     Row Name 04/13/23 1308          Functional Mobility    Functional Mobility- Ind. Level contact guard assist  -JJ     Row Name 04/13/23 1308          Activities of Daily Living    BADL Assessment/Intervention lower body dressing;toileting  -JJ     Row Name 04/13/23 1308          Mobility    Extremity Weight-bearing Status left upper extremity  -     Left Upper Extremity (Weight-bearing Status) non weight-bearing (NWB)  -JJ     Row Name 04/13/23 1308          Lower Body Dressing Assessment/Training    [place] Level (Lower Body Dressing) don;shoes/slippers  -     Position (Lower Body Dressing) unsupported sitting  -     Comment, (Lower Body Dressing) required Max A to tie shoes. But was able to doff socks while seated in chair and don tennis shoes.  -JJ     Row Name 04/13/23 1308          Toileting Assessment/Training    [place] Level (Toileting) perform perineal hygiene;adjust/manage clothing;change pad/brief;set up;standby assist  -     Assistive Devices (Toileting) commode  -JJ           User Key  (r) = Recorded By, (t) = Taken By, (c) = Cosigned By    Initials Name Provider Type    Vanessa Hutton OTR/L, MAGED Occupational Therapist               Obj/Interventions     Row Name 04/13/23 1308          Balance    Balance Assessment sitting static balance;sitting dynamic balance;standing dynamic balance;standing static balance  -J     Comment, Balance dynami standing balance tasks completed at sink side. No overt LOB.  -JJ           User Key  (r) = Recorded By, (t) = Taken By, (c) = Cosigned By    Initials Name Provider Type    Vanessa Hutton OTR/L, MATIS Occupational Therapist               Goals/Plan    No documentation.                Clinical Impression     Row Name 04/13/23 1308          Pain Assessment     Pain Intervention(s) Medication (See MAR);Repositioned;Ambulation/increased activity  -JJ     Row Name 04/13/23 1301          Plan of Care Review    Plan of Care Reviewed With patient  -FAROOQ     Progress improving  -JJ     Outcome Evaluation OT tx completed. Pt presents alert and oriented x4, sitting up in chair and agreeable to therapy. Remains on L forearm/hand soft splint, NWB. She demonstrates improved balance, activity tolerance and strength this date. Able to complete sit <> stand t/f from chair and commode with Min A/CGA. Doffed socks with set-up, donned shoes with CGA and Max A to tie shoe laces. She amb in hallway with CGA, no LOB or overt sway noted. Tolerated approx 10 minutes of standing activity this date. Complete all aspects of toileting with SBA and able to wash hands sink side with CGA for dynamic standing balance tasks. Left sitting up in chair at end of session. Continue OT POC.  -J     Row Name 04/13/23 1309          Therapy Plan Review/Discharge Plan (OT)    Anticipated Discharge Disposition (OT) sub acute care setting;home with assist;home with home health  -     Row Name 04/13/23 1301          Positioning and Restraints    Pre-Treatment Position sitting in chair/recliner  -JJ     Post Treatment Position chair  -JJ     In Chair notified nsg;reclined;call light within reach;encouraged to call for assist;legs elevated;with brace  -J           User Key  (r) = Recorded By, (t) = Taken By, (c) = Cosigned By    Initials Name Provider Type    Vanessa Hutton, DAER/L, CSRS Occupational Therapist               Outcome Measures     Row Name 04/13/23 1302          How much help from another is currently needed...    Putting on and taking off regular lower body clothing? 3  -JJ     Bathing (including washing, rinsing, and drying) 3  -JJ     Toileting (which includes using toilet bed pan or urinal) 3  -JJ     Putting on and taking off regular upper body clothing 3  -JJ     Taking care of personal  grooming (such as brushing teeth) 4  -JJ     Eating meals 4  -JJ     AM-PAC 6 Clicks Score (OT) 20  -JJ     Row Name 04/13/23 1303 04/13/23 0800       How much help from another person do you currently need...    Turning from your back to your side while in flat bed without using bedrails? 3 (P)   -SR 3  -MJ    Moving from lying on back to sitting on the side of a flat bed without bedrails? 3 (P)   -SR 3  -MJ    Moving to and from a bed to a chair (including a wheelchair)? 3 (P)   -SR 3  -MJ    Standing up from a chair using your arms (e.g., wheelchair, bedside chair)? 3 (P)   -SR 3  -MJ    Climbing 3-5 steps with a railing? 3 (P)   -SR 3  -MJ    To walk in hospital room? 3 (P)   -SR 3  -MJ    AM-PAC 6 Clicks Score (PT) 18 (P)   -SR 18  -MJ    Highest level of mobility 6 --> Walked 10 steps or more (P)   -SR 6 --> Walked 10 steps or more  -MJ    Row Name 04/13/23 1308 04/13/23 1303       Functional Assessment    Outcome Measure Options AM-PAC 6 Clicks Daily Activity (OT)  - AM-PAC 6 Clicks Basic Mobility (PT) (P)   -SR          User Key  (r) = Recorded By, (t) = Taken By, (c) = Cosigned By    Initials Name Provider Type    Vanessa Syed, OTR/L, CSRS Occupational Therapist    Ivet Malik, RN Registered Nurse    Walker Montiel, PT Student PT Student                Occupational Therapy Education     Title: PT OT SLP Therapies (In Progress)     Topic: Occupational Therapy (In Progress)     Point: ADL training (Done)     Description:   Instruct learner(s) on proper safety adaptation and remediation techniques during self care or transfers.   Instruct in proper use of assistive devices.              Learning Progress Summary           Patient Acceptance, E, VU by FAROOQ at 4/13/2023 1411    Acceptance, E, VU by FAROOQ at 4/12/2023 1333                   Point: Home exercise program (Not Started)     Description:   Instruct learner(s) on appropriate technique for monitoring, assisting and/or progressing  therapeutic exercises/activities.              Learner Progress:  Not documented in this visit.          Point: Precautions (Done)     Description:   Instruct learner(s) on prescribed precautions during self-care and functional transfers.              Learning Progress Summary           Patient Acceptance, E, VU by FAROOQ at 4/13/2023 1411    Acceptance, E, VU by FAROOQ at 4/12/2023 1333                   Point: Body mechanics (Not Started)     Description:   Instruct learner(s) on proper positioning and spine alignment during self-care, functional mobility activities and/or exercises.              Learner Progress:  Not documented in this visit.                      User Key     Initials Effective Dates Name Provider Type Discipline    FAROOQ 11/10/21 -  Vanessa Leger, OTR/L, CSRS Occupational Therapist OT              OT Recommendation and Plan  Planned Therapy Interventions (OT): activity tolerance training, BADL retraining, functional balance retraining, transfer/mobility retraining, strengthening exercise, occupation/activity based interventions, patient/caregiver education/training, adaptive equipment training  Therapy Frequency (OT): 5 times/wk  Plan of Care Review  Plan of Care Reviewed With: patient  Progress: improving  Outcome Evaluation: OT tx completed. Pt presents alert and oriented x4, sitting up in chair and agreeable to therapy. Remains on L forearm/hand soft splint, NWB. She demonstrates improved balance, activity tolerance and strength this date. Able to complete sit <> stand t/f from chair and commode with Min A/CGA. Doffed socks with set-up, donned shoes with CGA and Max A to tie shoe laces. She amb in hallway with CGA, no LOB or overt sway noted. Tolerated approx 10 minutes of standing activity this date. Complete all aspects of toileting with SBA and able to wash hands sink side with CGA for dynamic standing balance tasks. Left sitting up in chair at end of session. Continue OT POC.     Time  Calculation:    Time Calculation- OT     Row Name 04/13/23 1411             Time Calculation- OT    OT Start Time 1308  -      OT Stop Time 1405  -      OT Time Calculation (min) 57 min  -      Total Timed Code Minutes- OT 57 minute(s)  -      OT Received On 04/13/23  -            User Key  (r) = Recorded By, (t) = Taken By, (c) = Cosigned By    Initials Name Provider Type    Vanessa Syed, OTR/L, CSRS Occupational Therapist              Therapy Charges for Today     Code Description Service Date Service Provider Modifiers Qty    [id number]  OT EVAL LOW COMPLEXITY 3 4/12/2023 Vanessa Leger OTR/L, CSRS GO 1    [id number]  OT SELF CARE/MGMT/TRAIN EA 15 MIN 4/13/2023 Vanessa Leger OTR/L, CSRS GO 4               DESTINEY Cartagena/L, CSRS  4/13/2023

## 2023-04-13 NOTE — CASE MANAGEMENT/SOCIAL WORK
Continued Stay Note   [place]     Patient Name: Lilli Trivedi  MRN: [id number]  Today's Date: 4/13/2023    Admit Date: 4/11/2023    Plan: [facility]   Discharge Plan     Row Name 04/13/23 1347       Plan    Plan [facility]    Patient/Family in Agreement with Plan yes    Final Discharge Disposition Code 03 - skilled nursing facility (SNF)    Final Note [facility] has accepted pt.  Pt will have 3 night stay and can dc tomorrow 4/14.   [phone]  Pt will need covid test.               Discharge Codes    No documentation.               Expected Discharge Date and Time     Expected Discharge Date Expected Discharge Time    Apr 13, 2023             SHAQ BatemanW

## 2023-04-13 NOTE — CONSULTS
"Inpatient Cardiology Consult  Consult performed by: Enmanuel Barnard MD  Consult ordered by: Jennifer Nelson APRN  Reason for consult: \"a. ifb/flutter with RVR\"      Chief Complaint: Palpitaitons    HPI: This is a 67-year-old female who has no past medical history as she says that she simply has never gone to a physician before, presenting on transfer from outside hospital with difficult to control heart rate in the setting of atrial fibrillation.  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  Shortly afterwards, the patient began to feel some fullness in her ears and some upper respiratory type symptoms.  This followed a situation where she was exposed to cinnamon, which she states she is allergic to with previous anaphylaxis.  In any event, she went to a primary care provider and was prescribed steroids and antibiotics.  The patiently subsequently began to feel somewhat unsteady on her feet, thinking this was secondary to inner ear issues and fluid behind her tympanic membrane.  She ultimately fell.  She denies having any preceding palpitations, chest discomfort or other symptoms other than simply being unsteady on her feet.  She apparently was down for a prolonged period of time.  She was brought in for further evaluation and was found to have what was thought to be rhabdomyolysis.  She was also thought to have pneumonia.  In addition, she was found to be in atrial fibrillation with elevated heart rate.  At that time, the patient says that she felt some palpitations but no other symptoms related to atrial fibrillation.  However, she then received breathing treatments for her symptoms and began to feel palpitations.  Ultimately, heart rate was difficult to manage and she was transferred here for further evaluation.    At this time, the patient continues to have issues with heart rate control with heart rate predominantly remaining greater than 100 " bpm.  The patient notes some palpitations from time to time but denies having chest discomfort.  Her breathing is slowly improving.  She now has a nonproductive cough but no fevers or chills.  She denies having orthopnea, PND but does note some mild lower extremity edema.  She denies having lightheadedness, dizziness, syncope.    Past Medical History:   Diagnosis Date   • Disease of thyroid gland      Past Surgical History:   Procedure Laterality Date   • HYSTERECTOMY      with BSO   • TONSILLECTOMY         Current Facility-Administered Medications:   •  apixaban (ELIQUIS) tablet 5 mg, 5 mg, Oral, Q12H, Carlos King MD, 5 mg at 04/13/23 0525  •  sennosides-docusate (PERICOLACE) 8.6-50 MG per tablet 2 tablet, 2 tablet, Oral, BID, 2 tablet at 04/13/23 0957 **AND** polyethylene glycol (MIRALAX) packet 17 g, 17 g, Oral, Daily PRN **AND** bisacodyl (DULCOLAX) EC tablet 5 mg, 5 mg, Oral, Daily PRN **AND** bisacodyl (DULCOLAX) suppository 10 mg, 10 mg, Rectal, Daily PRN, Carlos King MD  •  dilTIAZem (CARDIZEM) tablet 30 mg, 30 mg, Oral, Q6H, Jennifer Nelson APRN  •  guaiFENesin (MUCINEX) 12 hr tablet 1,200 mg, 1,200 mg, Oral, Q12H, Jennifer Nelson APRN, 1,200 mg at 04/13/23 0943  •  levoFLOXacin (LEVAQUIN) 750 mg/150 mL D5W (premix) (LEVAQUIN) 750 mg, 750 mg, Intravenous, Q24H, Carlos King MD, 750 mg at 04/12/23 1705  •  Magnesium Sulfate - Total Dose 10 grams - Magnesium 1 or Less, 2 g, Intravenous, PRN **OR** Magnesium Sulfate - Total Dose 6 grams - Magnesium 1.1 - 1.5, 2 g, Intravenous, PRN **OR** Magnesium Sulfate - Total Dose 4 grams - Magnesium 1.6 - 1.9, 4 g, Intravenous, PRN, Carlos King MD, Last Rate: 25 mL/hr at 04/11/23 1752, 4 g at 04/11/23 1752  •  metoprolol tartrate (LOPRESSOR) tablet 50 mg, 50 mg, Oral, Q12H, Carlos King MD, 50 mg at 04/13/23 0942  •  mupirocin (BACTROBAN) 2 % ointment 1 application, 1 application, Topical, Q12H, Christine,  Carlos Escobar MD, 1 application at 04/13/23 0946  •  ondansetron (ZOFRAN) injection 4 mg, 4 mg, Intravenous, Q6H PRBHAVIN, Carlos King MD  •  Pharmacy to Dose Eliquis, , Does not apply, Continuous PRN, Carlos King MD  •  potassium chloride (KLOR-CON) packet 40 mEq, 40 mEq, Oral, PRNChristine Glenn Riego, MD  •  potassium chloride (MICRO-K) CR capsule 40 mEq, 40 mEq, Oral, PRN, Carlos King MD, 40 mEq at 04/12/23 0011  •  sodium chloride 0.9 % flush 10 mL, 10 mL, Intravenous, Q12H, Carlos King MD, 10 mL at 04/12/23 2112  •  sodium chloride 0.9 % flush 10 mL, 10 mL, Intravenous, PRNChristine Glenn Riego, MD  •  sodium chloride 0.9 % infusion 40 mL, 40 mL, Intravenous, PRN, Carlos King MD  •  thyroid (ARMOUR) tablet 15 mg, 15 mg, Oral, Daily, Nelson, Jennifer, APRN, 15 mg at 04/13/23 0944    Allergies   Allergen Reactions   • Barley Grass Shortness Of Breath and Rash   • Broccoli [Brassica Oleracea] Shortness Of Breath and Rash   • Cinnamon Anaphylaxis   • Mustard Shortness Of Breath and Rash     Sore throat    • Peanut-Containing Drug Products Anaphylaxis   • Penicillins Shortness Of Breath     Social History     Tobacco Use   • Smoking status: Never   • Smokeless tobacco: Never   Substance Use Topics   • Alcohol use: Yes     Family History   Problem Relation Age of Onset   • Lung cancer Father    • No Known Problems Mother    • Prostate cancer Brother 80   • No Known Problems Sister    • Breast cancer Maternal Grandmother 80   • Prostate cancer Maternal Grandfather 70   • Ovarian cancer Neg Hx    • Uterine cancer Neg Hx    • Colon cancer Neg Hx    • Melanoma Neg Hx      Review of Systems   Constitutional: Positive for malaise/fatigue. Negative for chills, fever and weight loss.   HENT: Negative for congestion and hearing loss.    Eyes: Negative for blurred vision and pain.   Cardiovascular: Positive for leg swelling and palpitations. Negative for  "chest pain, dyspnea on exertion, orthopnea, paroxysmal nocturnal dyspnea and syncope.   Respiratory: Positive for cough. Negative for shortness of breath, sputum production and wheezing.    Endocrine: Negative for cold intolerance and heat intolerance.   Hematologic/Lymphatic: Negative for adenopathy and bleeding problem.   Skin: Negative for color change, poor wound healing and rash.   Musculoskeletal: Positive for falls and joint pain. Negative for myalgias and neck pain.   Gastrointestinal: Negative for abdominal pain, nausea and vomiting.   Genitourinary: Negative for dysuria and frequency.   Neurological: Positive for weakness. Negative for dizziness, focal weakness, headaches, light-headedness, loss of balance and numbness.   Psychiatric/Behavioral: Negative for altered mental status and memory loss.   Allergic/Immunologic: Negative for hives and persistent infections.     Physical Exam:  /65 (BP Location: Right arm, Patient Position: Sitting)   Pulse 102   Temp 98.1 °F (36.7 °C) (Oral)   Resp 16   Ht 180.3 cm (71\")   Wt 56.6 kg (124 lb 12.8 oz)   LMP 06/28/2001 (Exact Date)   SpO2 96%   BMI 17.41 kg/m²   Temp:  [97.2 °F (36.2 °C)-98.4 °F (36.9 °C)] 98.1 °F (36.7 °C)  Heart Rate:  [] 102  Resp:  [16-18] 16  BP: (105-134)/(65-86) 105/65    Vitals reviewed.   Constitutional:       General: Not in acute distress.     Appearance: Well-developed and not in distress. Ill-appearing. Not toxic-appearing or diaphoretic.   Eyes:      General: Lids are normal.      Extraocular Movements: Extraocular movements intact.      Pupils: Pupils are equal, round, and reactive to light.   HENT:      Head: Normocephalic.      Comments: Bruising around right eye     Right Ear: External ear normal.      Left Ear: External ear normal.      Nose: Nose normal.    Mouth/Throat:      Mouth: Mucous membranes are not pale, not dry and not cyanotic.   Neck:      Thyroid: No thyromegaly.      Vascular: No carotid bruit or " JVD.   Pulmonary:      Effort: Pulmonary effort is normal.      Breath sounds: Decreased breath sounds present. No wheezing. No rhonchi. No rales.   Chest:      Chest wall: Not tender to palpatation.   Cardiovascular:      Tachycardia present. Irregularly irregular rhythm.      Murmurs: There is no murmur.      No gallop.   Pulses:     Intact distal pulses.   Edema:     Peripheral edema present.     Pretibial: bilateral trace edema of the pretibial area.     Ankle: bilateral trace edema of the ankle.  Abdominal:      General: Bowel sounds are normal. There is no distension or abdominal bruit.      Palpations: Abdomen is soft.      Tenderness: There is no abdominal tenderness.   Musculoskeletal: Normal range of motion.         General: No tenderness or deformity.      Extremities: No clubbing present.     Cervical back: Neck supple.      Comments: Left arm and hand with dressing/bandages in place Skin:     General: Skin is warm and dry. There is no cyanosis.      Findings: Abrasion and bruising present. No erythema or rash.   Neurological:      General: No focal deficit present.      Mental Status: Oriented to person, place, and time and oriented to person, place and time.      Cranial Nerves: No cranial nerve deficit.   Psychiatric:         Attention and Perception: Attention normal.         Mood and Affect: Mood normal.         Speech: Speech normal.         Behavior: Behavior normal. Behavior is cooperative.         Thought Content: Thought content normal.       Diagnostic Data:    Lab Results   Component Value Date    WBC 33.45 (C) 04/13/2023    HGB 13.5 04/13/2023    HCT 41.6 04/13/2023    MCV 87.8 04/13/2023     04/13/2023     Lab Results   Component Value Date    GLUCOSE 100 (H) 04/13/2023    CALCIUM 9.7 04/13/2023     04/13/2023    K 4.0 04/13/2023    CO2 30.0 (H) 04/13/2023     04/13/2023    BUN 22 04/13/2023    CREATININE 0.46 (L) 04/13/2023    EGFR 105.0 04/13/2023    BCR 47.8 (H)  04/13/2023    ANIONGAP 7.0 04/13/2023        Lab Results   Component Value Date    TSH 0.651 04/11/2023     Lab Results   Component Value Date    CKTOTAL 428 (H) 04/12/2023               Echo at OSH:      ASSESSMENT/PLAN:    1. Persistent atrial fibrillation/atrial flutter: Some rhythm strips appear to be most consistent with atrial flutter although previous ECGs from outside hospital were described as atrial fibrillation.  The patient is minimally symptomatic and otherwise hemodynamically stable at the current time.  2.  Leukocytosis with recent steroid administration, previously treated for pneumonia at outside hospital  3.  Nontraumatic rhabdomyolysis  4.  Nasal fracture  5.  Fall secondary to generalized weakness    -We are asked to see this patient remains in atrial fibrillation/flutter with elevated heart rate.  She is relatively hemodynamically stable and minimally symptomatic at this time but likely could benefit from further rate control.  The primary service is added Cardizem 30 mg p.o. every 6 hours in addition to Lopressor.  Continue to monitor heart rate response to this new medication.  This can be titrated further if needed for heart rate control.  Goal should be heart rate less than 100 predominately.  Likely, heart rate is elevated as a result of the patient's illness at this time and may improve with continued improvement in the patient's overall condition.  -The patient is appropriately anticoagulated with Eliquis at this time.  -Echocardiogram at outside hospital did not show any significant valvular abnormalities per report.  Normal systolic function also noted.  -At this time, other than efforts at rate control, currently being attempted by the primary service with the addition of Cardizem today along with anticoagulation, we would have no further recommendations.  Certainly, if heart rate remains uncontrolled despite addition of Cardizem today, this can be increased as noted above.  Please  feel free to call if we can assist further.

## 2023-04-13 NOTE — THERAPY EVALUATION
Patient Name: Lilli Trivedi  : 1955    MRN: [id number]                              Today's Date: 2023       Admit Date: 2023    Visit Dx:     ICD-10-CM ICD-9-CM   1. Decreased activities of daily living (ADL)  Z78.9 V49.89     Patient Active Problem List   Diagnosis   • Seasonal allergic rhinitis   • Atrial fibrillation with rapid ventricular response   • Hypokalemia   • Non-traumatic rhabdomyolysis   • Nasal fracture   • Transaminitis   • Closed displaced fracture of fourth metacarpal bone of left hand     Past Medical History:   Diagnosis Date   • Disease of thyroid gland      Past Surgical History:   Procedure Laterality Date   • HYSTERECTOMY      with BSO   • TONSILLECTOMY        General Information     Row Name 23 1303          Physical Therapy Time and Intention    Document Type evaluation  dx a-fib with rapid ventricular response, fractures in L hand and nasal bone d/t fall, hypokalemia, non-traumatic rhabdomyolysis. c/o palpitations and nonproductive cough. hx of thyroid disease.  -MONIQUE (r) SR (t) MONIQUE (c)     Mode of Treatment physical therapy  -MONIQUE (r) SR (t) MONIQUE (c)     Row Name 23 1303          General Information    Patient Profile Reviewed yes  -MONIQUE (r) SR (t) MONIQUE (c)     Prior Level of Function independent:;all household mobility;ADL's;gait  -MONIQUE (r) SR (t) MONIQUE (c)     Existing Precautions/Restrictions fall  -MONIQUE (r) SR (t) MONIQUE (c)     Barriers to Rehab previous functional deficit  -MONIQUE (r) SR (t) MONIQUE (c)     Row Name 23 1303          Living Environment    People in Home alone  -MONIQUE (r) SR (t) MONIQUE (c)     Row Name 23 1303          Home Main Entrance    Number of Stairs, Main Entrance other (see comments)  ramp  -MONIQUE (r) SR (t) MONIQUE (c)     Stair Railings, Main Entrance railings on both sides of stairs  -MONIQUE (r) SR (t) MONIQUE (c)     Row Name 23 1303          Stairs Within Home, Primary    Number of Stairs, Within Home, Primary none  -MONIQUE (r) SR (t) MONIQUE (c)     Row Name 23  1303          Cognition    Orientation Status (Cognition) oriented x 4  -MONIQUE (r) SR (t) MONIQUE (c)     Row Name 04/13/23 1303          Safety Issues, Functional Mobility    Impairments Affecting Function (Mobility) balance;endurance/activity tolerance;strength  -MONIQUE (r) SR (t) MONIQUE (c)           User Key  (r) = Recorded By, (t) = Taken By, (c) = Cosigned By    Initials Name Provider Type    Wilton Valverde, PT DPT Physical Therapist    Walker Montiel, PT Student PT Student               Mobility     Row Name 04/13/23 1303          Bed Mobility    Comment, (Bed Mobility) up in chair  -MONIQUE (r) SR (t) MONIQUE (c)     Row Name 04/13/23 1303          Sit-Stand Transfer    Sit-Stand [place] (Transfers) minimum assist (75% patient effort)  -MONIQUE (r) SR (t) MONIQUE (c)     Row Name 04/13/23 1303          Gait/Stairs (Locomotion)    [place] Level (Gait) contact guard  -MONIQUE (r) SR (t) MONIQUE (c)     Distance in Feet (Gait) 460 ft  -MONIQUE (r) SR (t) MONIQUE (c)     Deviations/Abnormal Patterns (Gait) gait speed decreased;stride length decreased;other (see comments)  increased sway, LOB  -MONIQUE (r) SR (t) MONIQUE (c)     Comment, (Gait/Stairs) 2 standing rest breaks  -MONIQUE (r) SR (t) MONIQUE (c)     Row Name 04/13/23 1303          Mobility    Extremity Weight-bearing Status left upper extremity  -MONIQUE (r) SR (t) MONIQUE (c)     Left Upper Extremity (Weight-bearing Status) non weight-bearing (NWB)  -MONIQUE (r) SR (t) MONIQUE (c)           User Key  (r) = Recorded By, (t) = Taken By, (c) = Cosigned By    Initials Name Provider Type    Wilton Valverde, PT DPT Physical Therapist    Walker Montiel, PT Student PT Student               Obj/Interventions     Row Name 04/13/23 1303          Range of Motion Comprehensive    General Range of Motion bilateral lower extremity ROM WFL;bilateral upper extremity ROM WFL  -MONIQUE (r) SR (t) MONIQUE (c)     Comment, General Range of Motion L wrist and hand in cast  -MONIQUE (r) SR (t) MONIQUE (c)     Row Name 04/13/23 1303          Strength Comprehensive  (MMT)    Comment, General Manual Muscle Testing (MMT) Assessment B DF/PF 4/5, B knee flex 4-/5, B knee ext and hip flex 3+/5  -MONIQUE (r) SR (t) MONIQUE (c)     Row Name 04/13/23 1305          Balance    Balance Assessment sitting static balance;sitting dynamic balance;standing static balance;standing dynamic balance  -MONIQUE (r) SR (t) MONIQUE (c)     Static Sitting Balance independent  -MONIQUE (r) SR (t) MONIQUE (c)     Dynamic Sitting Balance standby assist  -MONIQUE (r) SR (t) MONIQUE (c)     Position, Sitting Balance supported;sitting in chair  -MONIQUE (r) SR (t) MONIQUE (c)     Static Standing Balance contact guard  -MONIQUE (r) SR (t) MONIQUE (c)     Dynamic Standing Balance contact guard;minimal assist  -MONIQUE (r) SR (t) MONIQUE (c)     Position/Device Used, Standing Balance unsupported  -MONIQUE (r) SR (t) MONIQUE (c)     Balance Interventions sitting;standing;sit to stand  -MONIQUE (r) SR (t) MONIQUE (c)     Row Name 04/13/23 1307          Sensory Assessment (Somatosensory)    Sensory Assessment (Somatosensory) LE sensation intact  -MONIQUE (r) SR (t) MONIQUE (c)           User Key  (r) = Recorded By, (t) = Taken By, (c) = Cosigned By    Initials Name Provider Type    Wilton Valverde, PT DPT Physical Therapist    Walker Montiel, PT Student PT Student               Goals/Plan     Row Name 04/13/23 1773          Bed Mobility Goal 1 (PT)    Activity/Assistive Device (Bed Mobility Goal 1, PT) sit to supine/supine to sit  -MONIQUE (r) SR (t) MONIQUE (c)     [place] Level/Cues Needed (Bed Mobility Goal 1, PT) independent  -MONIQUE (r) SR (t) MONIQUE (c)     Time Frame (Bed Mobility Goal 1, PT) long term goal (LTG);10 days  -MONIQUE (r) SR (t) MONIQUE (c)     Progress/Outcomes (Bed Mobility Goal 1, PT) new goal  -MONIQUE (r) SR (t) MONIQUE (c)     Row Name 04/13/23 2346          Transfer Goal 1 (PT)    Activity/Assistive Device (Transfer Goal 1, PT) sit-to-stand/stand-to-sit;bed-to-chair/chair-to-bed  -MONIQUE (r) SR (t) MONIQUE (c)     [place] Level/Cues Needed (Transfer Goal 1, PT) standby assist  -MONIQUE (r) SR (t) MONIQUE (c)     Time Frame  "(Transfer Goal 1, PT) long term goal (LTG);10 days  -MONIQUE (r) SR (t) MONIQUE (c)     Progress/Outcome (Transfer Goal 1, PT) new goal  -MONIQUE (r) SR (t) MONIQUE (c)     Row Name 04/13/23 1303          Gait Training Goal 1 (PT)    Activity/Assistive Device (Gait Training Goal 1, PT) gait (walking locomotion);improve balance and speed;decrease fall risk;increase endurance/gait distance;increase energy conservation  -MONIQUE (r) SR (t) MONIQUE (c)     [place] Level (Gait Training Goal 1, PT) standby assist  -MONIQUE (r) SR (t) MONIQUE (c)     Distance (Gait Training Goal 1, PT) 460 ft  -MONIQUE (r) SR (t) MONIQUE (c)     Time Frame (Gait Training Goal 1, PT) long term goal (LTG);10 days  -MONIQUE (r) SR (t) MONIQUE (c)     Progress/Outcome (Gait Training Goal 1, PT) new goal  -MONIQUE (r) SR (t) MONIQUE (c)     Row Name 04/13/23 1303          Therapy Assessment/Plan (PT)    Planned Therapy Interventions (PT) balance training;bed mobility training;gait training;patient/family education;strengthening;transfer training  -MONIQUE (r) SR (t) MONIQUE (c)           User Key  (r) = Recorded By, (t) = Taken By, (c) = Cosigned By    Initials Name Provider Type    Wilton Valverde, PT DPT Physical Therapist    Walker Montiel, PT Student PT Student               Clinical Impression     Row Name 04/13/23 1303          Pain    Pretreatment Pain Rating 0/10 - no pain  -MONIQUE (r) SR (t) MONIQUE (c)     Row Name 04/13/23 1303          Plan of Care Review    Plan of Care Reviewed With patient  -MONIQUE (r) SR (t) MONIQUE (c)     Outcome Evaluation PT eval complete. Pt A&O x4 and sitting in bedside recliner, L wrist/hand in cast and NWB. MMT revealed strength deficits throughout BLE, pt reports that she is tender from fall and describes the feeling as muscle fatigue \"like after you work your muscles too hard\". Pt required Adria to perform sit to stand and was able to ambulate with her shoes on and CGA for 460 ft in the hallway with minor self-corrected LOB and 2 standing rest breaks. Pt demonstrates decreased muscle " strength, impaired balance, and decreased gait speed and would benefit from skilled PT services. PT recommends HH upon d/c.  -MONIQUE (r) SR (t) MONIQUE (c)     Row Name 04/13/23 1303          Therapy Assessment/Plan (PT)    Patient/Family Therapy Goals Statement (PT) go home  -MONIQUE     Rehab Potential (PT) good, to achieve stated therapy goals  -MONIQUE (r) SR (t) MONIQUE (c)     Criteria for Skilled Interventions Met (PT) yes;meets criteria  -MONIQUE (r) SR (t) MONIQUE (c)     Therapy Frequency (PT) 2 times/day  -MONIQUE (r) SR (t) MONIQUE (c)     Predicted Duration of Therapy Intervention (PT) until d/c  -MONIQUE (r) SR (t) MONIQUE (c)     Row Name 04/13/23 1303          Positioning and Restraints    Pre-Treatment Position sitting in chair/recliner  -MONIQUE (r) SR (t) MONIQUE (c)     Post Treatment Position chair  -MONIQUE (r) SR (t) MONIQUE (c)     In Chair call light within reach;encouraged to call for assist;legs elevated;reclined  -MONIQUE (r) SR (t) MONIQUE (c)           User Key  (r) = Recorded By, (t) = Taken By, (c) = Cosigned By    Initials Name Provider Type    Wiltno Valverde, PT DPT Physical Therapist    Walker Montiel, ELISA Student PT Student               Outcome Measures     Row Name 04/13/23 1303 04/13/23 0800       How much help from another person do you currently need...    Turning from your back to your side while in flat bed without using bedrails? 3  -MONIQUE (r) SR (t) MONIQUE (c) 3  -MJ    Moving from lying on back to sitting on the side of a flat bed without bedrails? 3  -MONIQUE (r) SR (t) MONIQUE (c) 3  -MJ    Moving to and from a bed to a chair (including a wheelchair)? 3  -MONIQUE (r) SR (t) MONIQUE (c) 3  -MJ    Standing up from a chair using your arms (e.g., wheelchair, bedside chair)? 3  -MONIQUE (r) SR (t) MONIQUE (c) 3  -MJ    Climbing 3-5 steps with a railing? 3  -MONIQUE (r) SR (t) MONIQUE (c) 3  -MJ    To walk in hospital room? 3  -MONIQUE (r) SR (t) MONIQUE (c) 3  -MJ    AM-PAC 6 Clicks Score (PT) 18  -MONIQUE (r) SR (t) 18  -MJ    Highest level of mobility 6 --> Walked 10 steps or more  -MONIQUE (r) SR (t) 6 --> Walked  "10 steps or more  -    Row Name 04/13/23 1308 04/13/23 1303       Functional Assessment    Outcome Measure Options AM-PAC 6 Clicks Daily Activity (OT)  -FAROOQ AM-PAC 6 Clicks Basic Mobility (PT)  -MONIQUE (r) SR (t) MONIQUE (c)          User Key  (r) = Recorded By, (t) = Taken By, (c) = Cosigned By    Initials Name Provider Type    MONIQUE Wilton Romero, PT DPT Physical Therapist    Vanessa Hutton, OTR/L, CSRS Occupational Therapist    Ivet Malik, RN Registered Nurse    SR Walker Brown, PT Student PT Student                             Physical Therapy Education     Title: PT OT SLP Therapies (In Progress)     Topic: Physical Therapy (In Progress)     Point: Mobility training (Done)     Learning Progress Summary           Patient Acceptance, E, VU by SR at 4/13/2023 1303    Comment: pt educated on safe ambulation and transfer techniques                   Point: Home exercise program (Not Started)     Learner Progress:  Not documented in this visit.          Point: Body mechanics (Not Started)     Learner Progress:  Not documented in this visit.          Point: Precautions (Not Started)     Learner Progress:  Not documented in this visit.                      User Key     Initials Effective Dates Name Provider Type Discipline    SR 03/15/23 -  Walker Brown, PT Student PT Student PT              PT Recommendation and Plan  Planned Therapy Interventions (PT): balance training, bed mobility training, gait training, patient/family education, strengthening, transfer training  Plan of Care Reviewed With: patient  Outcome Evaluation: PT eval complete. Pt A&O x4 and sitting in bedside recliner, L wrist/hand in cast and NWB. MMT revealed strength deficits throughout BLE, pt reports that she is tender from fall and describes the feeling as muscle fatigue \"like after you work your muscles too hard\". Pt required Adria to perform sit to stand and was able to ambulate with her shoes on and CGA for 460 ft in the hallway with " minor self-corrected LOB and 2 standing rest breaks. Pt demonstrates decreased muscle strength, impaired balance, and decreased gait speed and would benefit from skilled PT services. PT recommends HH upon d/c.     Time Calculation:    PT Charges     Row Name 04/13/23 1434             Time Calculation    Start Time 1303  -MONIQUE (r) SR (t) MONIQUE (c)      Stop Time 1356  -MONIQUE (r) SR (t) MONIQUE (c)      Time Calculation (min) 53 min  -MONIQUE (r) SR (t)      PT Received On 04/13/23  -MONIQUE (r) SR (t) MONIQUE (c)      PT Goal Re-Cert Due Date 04/23/23  -MONIQUE (r) SR (t) MONIQUE (c)            User Key  (r) = Recorded By, (t) = Taken By, (c) = Cosigned By    Initials Name Provider Type    Wilton Valverde, PT DPT Physical Therapist    Walker Montiel, PT Student PT Student                  PT G-Codes  Outcome Measure Options: AM-PAC 6 Clicks Daily Activity (OT)  AM-PAC 6 Clicks Score (PT): 18  AM-PAC 6 Clicks Score (OT): 20  PT Discharge Summary  Anticipated Discharge Disposition (PT): home with home health    Walker Brown, PT Student  4/13/2023

## 2023-04-14 VITALS
RESPIRATION RATE: 20 BRPM | TEMPERATURE: 98.1 F | HEART RATE: 84 BPM | WEIGHT: 117.8 LBS | HEIGHT: 71 IN | DIASTOLIC BLOOD PRESSURE: 70 MMHG | BODY MASS INDEX: 16.49 KG/M2 | SYSTOLIC BLOOD PRESSURE: 127 MMHG | OXYGEN SATURATION: 95 %

## 2023-04-14 LAB
ANISOCYTOSIS BLD QL: ABNORMAL
BURR CELLS BLD QL SMEAR: ABNORMAL
CYTOLOGIST CVX/VAG CYTO: NORMAL
DEPRECATED RDW RBC AUTO: 47.1 FL (ref 37–54)
EOSINOPHIL # BLD MANUAL: 0.3 10*3/MM3 (ref 0–0.4)
EOSINOPHIL NFR BLD MANUAL: 1 % (ref 0.3–6.2)
ERYTHROCYTE [DISTWIDTH] IN BLOOD BY AUTOMATED COUNT: 14.3 % (ref 12.3–15.4)
HCT VFR BLD AUTO: 39.3 % (ref 34–46.6)
HGB BLD-MCNC: 12.6 G/DL (ref 12–15.9)
LYMPHOCYTES # BLD MANUAL: 2.47 10*3/MM3 (ref 0.7–3.1)
LYMPHOCYTES NFR BLD MANUAL: 5.1 % (ref 5–12)
MCH RBC QN AUTO: 29.2 PG (ref 26.6–33)
MCHC RBC AUTO-ENTMCNC: 32.1 G/DL (ref 31.5–35.7)
MCV RBC AUTO: 91 FL (ref 79–97)
METAMYELOCYTES NFR BLD MANUAL: 2 % (ref 0–0)
MONOCYTES # BLD: 1.55 10*3/MM3 (ref 0.1–0.9)
MYELOCYTES NFR BLD MANUAL: 3 % (ref 0–0)
NEUTROPHILS # BLD AUTO: 24.64 10*3/MM3 (ref 1.7–7)
NEUTROPHILS NFR BLD MANUAL: 78.8 % (ref 42.7–76)
NEUTS BAND NFR BLD MANUAL: 2 % (ref 0–5)
PATH INTERP BLD-IMP: NORMAL
PLAT MORPH BLD: NORMAL
PLATELET # BLD AUTO: 246 10*3/MM3 (ref 140–450)
PMV BLD AUTO: 9 FL (ref 6–12)
POIKILOCYTOSIS BLD QL SMEAR: ABNORMAL
QT INTERVAL: 332 MS
QT INTERVAL: 364 MS
QTC INTERVAL: 430 MS
QTC INTERVAL: 462 MS
RBC # BLD AUTO: 4.32 10*6/MM3 (ref 3.77–5.28)
SARS-COV-2 AG RESP QL IA.RAPID: NORMAL
VARIANT LYMPHS NFR BLD MANUAL: 8.1 % (ref 19.6–45.3)
WBC MORPH BLD: NORMAL
WBC NRBC COR # BLD: 30.49 10*3/MM3 (ref 3.4–10.8)

## 2023-04-14 PROCEDURE — 97530 THERAPEUTIC ACTIVITIES: CPT

## 2023-04-14 PROCEDURE — 87426 SARSCOV CORONAVIRUS AG IA: CPT | Performed by: NURSE PRACTITIONER

## 2023-04-14 PROCEDURE — 97535 SELF CARE MNGMENT TRAINING: CPT

## 2023-04-14 PROCEDURE — 85007 BL SMEAR W/DIFF WBC COUNT: CPT | Performed by: INTERNAL MEDICINE

## 2023-04-14 PROCEDURE — 85025 COMPLETE CBC W/AUTO DIFF WBC: CPT | Performed by: INTERNAL MEDICINE

## 2023-04-14 RX ORDER — DILTIAZEM HYDROCHLORIDE 120 MG/1
120 CAPSULE, COATED, EXTENDED RELEASE ORAL
Status: DISCONTINUED | OUTPATIENT
Start: 2023-04-14 | End: 2023-04-14 | Stop reason: HOSPADM

## 2023-04-14 RX ORDER — METOPROLOL TARTRATE 50 MG/1
50 TABLET, FILM COATED ORAL EVERY 12 HOURS SCHEDULED
Start: 2023-04-14

## 2023-04-14 RX ORDER — DILTIAZEM HYDROCHLORIDE 120 MG/1
120 CAPSULE, COATED, EXTENDED RELEASE ORAL
Start: 2023-04-14

## 2023-04-14 RX ADMIN — DOCUSATE SODIUM 50 MG AND SENNOSIDES 8.6 MG 2 TABLET: 8.6; 5 TABLET, FILM COATED ORAL at 09:35

## 2023-04-14 RX ADMIN — MUPIROCIN 1 APPLICATION: 20 OINTMENT TOPICAL at 00:58

## 2023-04-14 RX ADMIN — THYROID, PORCINE 15 MG: 15 TABLET ORAL at 09:35

## 2023-04-14 RX ADMIN — DILTIAZEM HYDROCHLORIDE 30 MG: 30 TABLET, FILM COATED ORAL at 00:58

## 2023-04-14 RX ADMIN — DILTIAZEM HYDROCHLORIDE 30 MG: 30 TABLET, FILM COATED ORAL at 05:24

## 2023-04-14 RX ADMIN — APIXABAN 5 MG: 5 TABLET, FILM COATED ORAL at 05:24

## 2023-04-14 RX ADMIN — METOPROLOL TARTRATE 50 MG: 50 TABLET, FILM COATED ORAL at 09:35

## 2023-04-14 RX ADMIN — Medication 10 ML: at 09:34

## 2023-04-14 RX ADMIN — GUAIFENESIN 1200 MG: 600 TABLET, EXTENDED RELEASE ORAL at 09:35

## 2023-04-14 RX ADMIN — DILTIAZEM HYDROCHLORIDE 30 MG: 30 TABLET, FILM COATED ORAL at 11:46

## 2023-04-14 NOTE — DISCHARGE SUMMARY
[facility] Medicine Services  DISCHARGE SUMMARY       Date of Admission: 4/11/2023  Date of Discharge:  4/14/2023  Primary Care Physician: Vanessa Regalado APRN    Presenting Problem/History of Present Illness:  fall    Final Discharge Diagnoses:  Active Hospital Problems    Diagnosis    • **Atrial fibrillation with rapid ventricular response    • Acute traumatic displaced 4th and 5th metacarpal fracture of the left hand    • Hypokalemia    • Non-traumatic rhabdomyolysis    • Nasal fracture    • Transaminitis        Consults: Dr. Barnard with cardiology.  Dr. Cobian with orthopedics.    Procedures Performed: none.    Pertinent Test Results:      Echo at [facility]:         Imaging Results (All)     Procedure Component Value Units Date/Time    XR Hand 3+ View Left [[id number]] Collected: 04/13/23 1415     Updated: 04/13/23 1419    Narrative:      EXAMINATION: XR HAND 3+ VW LEFT-     4/13/2023 1:56 PM CDT     HISTORY: suspected fracture; Z78.9-Other specified health status     Left hand, 3 views.     Overlying splint obscures detail.     Osteopenia.     Displaced fractures of the fourth and fifth metacarpal neck.     No dislocation.     Fingers are intact.     The first through third metacarpals are intact.     Carpal bones align normally.     Summary:  1. Displaced fractures of the left fourth and fifth metacarpal neck.  This report was finalized on 04/13/2023 14:16 by Dr. Valentin Bello MD.    XR Chest 1 View [[id number]] Collected: 04/11/23 1438     Updated: 04/11/23 1445    Narrative:      Frontal upright radiograph of the chest 4/11/2023 2:29 PM CDT     HISTORY: Renal failure     COMPARISON: None.     FINDINGS:      There is a thin reticular opacities identified in both upper lobes,  likely fibrotic. No consolidation is seen. Lungs are well expanded. No  pleural effusion or pneumothorax. Heart size is normal. Pulmonary  vasculature are nondilated. No  acute bony abnormality seen.       Impression:      1. There appear to be mild fibrotic changes in both upper lobes. No  superimposed acute process.        This report was finalized on 04/11/2023 14:42 by Dr Car Hernadez, .        LAB RESULTS:      Lab 04/14/23  0247 04/13/23  0311 04/12/23  1338 04/11/23  1514   WBC 30.49* 33.45* 31.50* 27.13*   HEMOGLOBIN 12.6 13.5 13.4 13.7   HEMATOCRIT 39.3 41.6 41.0 40.2   PLATELETS 246 305 316 275   NEUTROS ABS 24.64* 27.31* 23.86* 23.02*   EOS ABS 0.30  --   --   --    MCV 91.0 87.8 88.4 86.6   PROCALCITONIN  --   --   --  0.07         Lab 04/13/23  0311 04/12/23  1338 04/12/23  0309 04/11/23  1514   SODIUM 137 136  --  139   POTASSIUM 4.0 4.2 4.4 2.7*   CHLORIDE 100 100  --  98   CO2 30.0* 26.0  --  31.0*   ANION GAP 7.0 10.0  --  10.0   BUN 22 26*  --  12   CREATININE 0.46* 0.52*  --  0.41*   EGFR 105.0 102.0  --  108.0   GLUCOSE 100* 130*  --  115*   CALCIUM 9.7 10.6*  --  10.2   MAGNESIUM  --  2.6*  --  1.7   TSH  --   --   --  0.651         Lab 04/12/23  1338 04/11/23  1514   TOTAL PROTEIN 5.7* 6.3   ALBUMIN 3.5 3.8   GLOBULIN 2.2 2.5   ALT (SGPT) 171* 215*   AST (SGOT) 65* 103*   BILIRUBIN 0.5 0.6   ALK PHOS 89 95             Lab 04/11/23  1514   CHOLESTEROL 162   LDL CHOL 79   HDL CHOL 66*   TRIGLYCERIDES 93             Brief Urine Lab Results     None        Microbiology Results (last 10 days)     ** No results found for the last 240 hours. **          Hospital Course:   Ms. Trivedi is a 67-year-old female that was transferred from [facility] due to atrial fibrillation with poorly controlled heart rate.  Recently was seen at [facility] primary care on 4/3/2023 and was diagnosed with bronchitis.  She was started on Mucinex, Z-Pardeep and Medrol Dosepak.  She was admitted to [facility] on 4/7 with pneumonia and rhabdomyolysis.  She lives home alone.  Reportedly had a fall in which she stayed on the ground for 2 days until she was  able to make a phone call for help.  She did hit her face when she fell and left upper extremity.  At [facility] emergency department, CPK reportedly 21,424 and troponin was 288. WBC 22,000.  CT of the chest revealed pulmonary contusion and pneumonia for which she was getting Levaquin and vancomycin.  She was getting IV Solu-Medrol.  She was also found to have fracture to left upper extremity and has a soft cast in place in addition to nasal fracture.  Initial EKG showed atrial fibrillation no acute T-T wave changes. She had received Cardizem boluses and p.o. Cardizem however continued to have heart rate up to 160s.  2D echocardiogram showed normal ejection fraction.  No significant valvular pathology. Facility was unable to initiate Cardizem drip due to unit capabilities.  Family and patient requested transfer.    Atrial fibrillation with rapid ventricular response.  Cardiology, Dr. Barnard consulted.  Transthoracic echocardiogram at outlying facility showed LV cavity is normal in size.  Normal global wall motion.  EF is 60 to 65% with normal diastolic filling.  Structurally trileaflet aortic valve with mild regurgitation, mild mitral regurgitation.  No evidence of pulmonary hypertension. On telemetry, she was in and out of atrial fibrillation overnight.  Now currently normal sinus rhythm 80s.  We will transition short acting Cardizem to Cardizem CD1 20 mg daily. Continue metoprolol 50 twice daily.  Continue Eliquis.      CT at [facility] showed pneumonia.  She was given vancomycin and Levaquin.  She completed 7-day treatment with Levaquin on 4/13. Chest x-ray on 4/11 showed mild fibrotic changes in both upper lobes.  No superimposed acute process.  On room air.  Procalcitonin 0.07.  No fever. WBC 27 with follow-up 30 (in the setting of steroid use).  Recommend repeat CBC as outpatient to ensure resolution of leukocytosis.  Consider hematology consult if persistent leukocytosis.      Orthopedics  "consulted for acute traumatic displaced 4th and 5th metacarpal fracture of the left hand. States that she did not see an orthopedic physician at [facility] and was told to follow-up with orthopedics as outpatient.  Discussed with Osvaldo Ceballos, she will follow-up with orthopedics on Monday, 4/17 and will be scheduled for outpatient surgery next week.  Remain in splint.     Nontraumatic rhabdomyolysis with reported CK 21,424 at outlying facility.  , 428.     Hypokalemic, 2.7 on admission.  Received potassium supplementation.  Follow-up 4.0.  Magnesium 2.6.     Wound care consult for right lateral ankle, left anterior distal lower leg and bilateral elbows and right knee.     Eliquis will serve as DVT prophylaxis.     PT/OT.  [facility] is able to accept patient today.    Overall, patient reports feeling much better.  Normal sinus rhythm.  On room air.  Denies any acute complaints.  She feels ready for discharge.  She has reached maximum benefit of hospitalization.  She is medically stable and appropriate for discharge today.    Physical Exam on Discharge:  /70 (BP Location: Left arm, Patient Position: Sitting)   Pulse 84   Temp 98.1 °F (36.7 °C) (Oral)   Resp 20   Ht 180.3 cm (71\")   Wt 53.4 kg (117 lb 12.8 oz)   LMP 06/28/2001 (Exact Date)   SpO2 95%   BMI 16.43 kg/m²   Physical Exam  Vitals reviewed.   Constitutional:       General: She is not in acute distress.     Appearance: She is not toxic-appearing.      Comments: Up in bed.  No acute distress.  On room air.  No family at bedside.  Bruising under right eye.  Discussed with her nurse miles.   HENT:      Head: Normocephalic and atraumatic.      Mouth/Throat:      Mouth: Mucous membranes are moist.      Pharynx: Oropharynx is clear.   Eyes:      Extraocular Movements: Extraocular movements intact.      Conjunctiva/sclera: Conjunctivae normal.      Pupils: Pupils are equal, round, and reactive to light.   Cardiovascular:      Rate and " Rhythm: Normal rate. Normal rhythm.     Pulses: Normal pulses.      Comments: In and out of atrial fibrillation overnight, Currently normal sinus 80s since 8-9 AM.  Pulmonary:      Effort: Pulmonary effort is normal. No respiratory distress.   Abdominal:      General: Bowel sounds are normal. There is no distension.      Palpations: Abdomen is soft.      Tenderness: There is no abdominal tenderness.   Musculoskeletal:         General: No swelling or tenderness. Normal range of motion.      Cervical back: Normal range of motion and neck supple. No muscular tenderness.   Skin:     General: Skin is warm and dry.      Findings: No erythema or rash.      Comments: Multiple abrasions with dressings in place.  Cast to left upper extremity.   Neurological:      General: No focal deficit present.      Mental Status: She is alert and oriented to person, place, and time.      Cranial Nerves: No cranial nerve deficit.      Motor: No weakness.   Psychiatric:         Mood and Affect: Mood normal.         Behavior: Behavior normal.     Condition on Discharge: medically stable.    Discharge Disposition:  Skilled Nursing Facility (DC - External)    Discharge Medications:     Discharge Medications      New Medications      Instructions Start Date   apixaban 5 MG tablet tablet  Commonly known as: ELIQUIS   5 mg, Oral, Every 12 Hours Scheduled      dilTIAZem  MG 24 hr capsule  Commonly known as: CARDIZEM CD   120 mg, Oral, Every 24 Hours Scheduled      metoprolol tartrate 50 MG tablet  Commonly known as: LOPRESSOR   50 mg, Oral, Every 12 Hours Scheduled      mupirocin 2 % ointment  Commonly known as: BACTROBAN   1 application, Topical, Every 12 Hours Scheduled         Continue These Medications      Instructions Start Date   b complex vitamins capsule   1 capsule, Oral, Daily      calcium carbonate 600 MG tablet  Commonly known as: OS-SHAISTA   600 mg, Oral, Daily      guaiFENesin 600 MG 12 hr tablet  Commonly known as: Mucinex    1,200 mg, Oral, 2 Times Daily      MAGNESIUM PO   200 mg, Oral, Daily      potassium gluconate 595 (99 K) MG tablet tablet   595 mg, Oral, Daily      thyroid 15 MG tablet  Commonly known as: ARMOUR   15 mg, Oral, Daily      Vitamin D3 50 MCG (2000 UT) capsule   2,000 Units, Oral, Daily      Zinc 50 MG tablet   1 tablet, Oral, Daily         Stop These Medications    azithromycin 250 MG tablet  Commonly known as: Zithromax Z-Pardeep     fluconazole 150 MG tablet  Commonly known as: Diflucan     methylPREDNISolone 4 MG dose pack  Commonly known as: MEDROL            Discharge Diet:   Diet Instructions     Diet: Regular/House Diet, Cardiac Diets; Healthy Heart (2-3 Na+); Regular Texture (IDDSI 7); Thin (IDDSI 0)      Discharge Diet:  Regular/House Diet  Cardiac Diets       Cardiac Diet: Healthy Heart (2-3 Na+)    Texture: Regular Texture (IDDSI 7)    Fluid Consistency: Thin (IDDSI 0)          Activity at Discharge:   Activity Instructions     Activity as Tolerated            Wound care:  1.  Right lateral ankle: Clean with NS.  Apply silicone border foam dressing. Change daily.  2.  Left anterior distal lower leg: Clean with NS.  Apply opticell Ag to wound bed.  Cover with silicone border foam dressing.  3.  Bilateral elbows and right knee: Clean with NS.  Apply bactroban and cover with dry dressing.    Follow-up Appointments:   1.  Follow-up with RASHI Montemayor in 1 week.  2.  Follow-up with orthopedics on Monday, 4/17.    Test Results Pending at Discharge: none.    Electronically signed by RASHI Lynn, 04/14/23, 12:05 CDT.    Time: 35 minutes.

## 2023-04-14 NOTE — THERAPY DISCHARGE NOTE
Acute Care - Occupational Therapy Discharge Summary  [facility]     Patient Name: Lilli Trivedi  : 1955  MRN: [id number]    Today's Date: 2023                 Admit Date: 2023        OT Recommendation and Plan    Visit Dx:    ICD-10-CM ICD-9-CM   1. Decreased activities of daily living (ADL)  Z78.9 V49.89   2. Impaired mobility  Z74.09 799.89          Time Calculation- OT     Row Name 23 1347             Time Calculation- OT    OT Start Time 1046  -LS      OT Stop Time 1124  -LS      OT Time Calculation (min) 38 min  -LS      Total Timed Code Minutes- OT 38 minute(s)  -LS      OT Received On 23  -LS            User Key  (r) = Recorded By, (t) = Taken By, (c) = Cosigned By    Initials Name Provider Type    eVlma Fuller COTA Occupational Therapist Assistant                   OT Rehab Goals     Row Name 23 1600             Dressing Goal 1 (OT)    Activity/Device (Dressing Goal 1, OT) lower body dressing  -LS      [place]/Cues Needed (Dressing Goal 1, OT) standby assist  -LS      Time Frame (Dressing Goal 1, OT) long term goal (LTG);by discharge  -LS      Progress/Outcome (Dressing Goal 1, OT) goal met  -LS         Toileting Goal 1 (OT)    Activity/Device (Toileting Goal 1, OT) toileting skills, all  -LS      [place] Level/Cues Needed (Toileting Goal 1, OT) independent  -LS      Time Frame (Toileting Goal 1, OT) long term goal (LTG);by discharge  -LS      Progress/Outcome (Toileting Goal 1, OT) goal not met  -LS         Strength Goal 1 (OT)    Strength Goal 1 (OT) Pt will increase B UE strength to 4+/5 for improved independence with adls and mobility.  -LS      Time Frame (Strength Goal 1, OT) long term goal (LTG);by discharge  -LS      Progress/Outcome (Strength Goal 1, OT) goal not met  -LS         Problem Specific Goal 1 (OT)    Problem Specific Goal 1 (OT) Pt will participate in 10 minutes of adl tasks in standing without LOB or rest break to increase her tolerance for  activity.  -LS      Time Frame (Problem Specific Goal 1, OT) long term goal (LTG);by discharge  -      Progress/Outcome (Problem Specific Goal 1, OT) goal not met  -            User Key  (r) = Recorded By, (t) = Taken By, (c) = Cosigned By    Initials Name Provider Type Discipline    Velma Fuller COTA Occupational Therapist Assistant THERAPIES                 Outcome Measures     Row Name 04/14/23 1100             How much help from another is currently needed...    Putting on and taking off regular lower body clothing? 3 (P)   -GK      Bathing (including washing, rinsing, and drying) 3 (P)   -GK      Toileting (which includes using toilet bed pan or urinal) 3 (P)   -GK      Putting on and taking off regular upper body clothing 3 (P)   -GK      Taking care of personal grooming (such as brushing teeth) 4 (P)   -GK      Eating meals 4 (P)   -GK      AM-PAC 6 Clicks Score (OT) 20 (P)   -GK         Functional Assessment    Outcome Measure Options AM-PAC 6 Clicks Daily Activity (OT) (P)   -            User Key  (r) = Recorded By, (t) = Taken By, (c) = Cosigned By    Initials Name Provider Type    Mejia Silva OTA Student OT Student                    Therapy Charges for Today     Code Description Service Date Service Provider Modifiers Qty    [id number]  OT SELF CARE/MGMT/TRAIN EA 15 MIN 4/14/2023 Velma Mendoza COTA GO 2    [id number]  OT THERAPEUTIC ACT EA 15 MIN 4/14/2023 Velma Mendoza COTA GO 1          OT Discharge Summary  Anticipated Discharge Disposition (OT): skilled nursing facility  Reason for Discharge: Discharge from facility  Outcomes Achieved: Refer to plan of care for updates on goals achieved  Discharge Destination: SNF      NAYELI Decker  4/14/2023

## 2023-04-14 NOTE — CONSULTS
Orthopaedic Inpatient Consultation    NAME:  Lilli Trivedi   : 1955  MRN: [id number]    2023 11:43 AM    Requesting Physician: Leena Rees APRN    CHIEF COMPLAINT:  left hand pain      HISTORY OF PRESENT ILLNESS:   The patient is a 67 y.o. female who had a fall at home. She was down for several days and developed rhabdomyolysis.  Pain is located in the left hand, rated a 3/5, dull and constant, worse with movement, better with rest and medication.  There are no associated symptoms.      Past Medical History:    Past Medical History:   Diagnosis Date   • Disease of thyroid gland        Past Surgical History:    Past Surgical History:   Procedure Laterality Date   • HYSTERECTOMY      with BSO   • TONSILLECTOMY         Current Medications:   Prior to Admission medications    Medication Sig Start Date End Date Taking? Authorizing Provider   b complex vitamins capsule Take 1 capsule by mouth Daily.   Yes Abbi De Jesus MD   calcium carbonate (OS-SHAISTA) 600 MG tablet Take 1 tablet by mouth Daily.   Yes Abbi De Jesus MD   Cholecalciferol (VITAMIN D3) 2000 units capsule Take 1 capsule by mouth Daily.   Yes Abbi De Jesus MD   guaiFENesin (Mucinex) 600 MG 12 hr tablet Take 2 tablets by mouth 2 (Two) Times a Day. 4/3/23  Yes Ave Canada APRN   MAGNESIUM PO Take 200 mg by mouth Daily.   Yes Abbi De Jesus MD   potassium gluconate 595 (99 K) MG tablet tablet Take 1 tablet by mouth Daily.   Yes Abbi De Jesus MD   thyroid (ARMOUR) 15 MG tablet Take 1 tablet by mouth Daily.   Yes Abbi De Jesus MD   Zinc 50 MG tablet Take 1 tablet by mouth Daily.   Yes Abbi De Jesus MD   azithromycin (Zithromax Z-Pardeep) 250 MG tablet Take 2 tablets the first day, then 1 tablet daily for 4 days.  Patient not taking: Reported on 2023 4/3/23   Ave Canada APRN   fluconazole (Diflucan) 150 MG tablet Day 1 take 1 tablet.  If no improvement in symptoms in 3  days, take the second tablet.  Patient not taking: Reported on 4/11/2023 2/16/23   Vanessa Regalado APRN   methylPREDNISolone (MEDROL) 4 MG dose pack Take as directed on package instructions.  Patient not taking: Reported on 4/11/2023 4/3/23   Ave Canada APRN       Allergies:  Barley grass, Broccoli [brassica oleracea], Cinnamon, Mustard, Peanut-containing drug products, and Penicillins    Social History:   Social History     Socioeconomic History   • Marital status:    Tobacco Use   • Smoking status: Never   • Smokeless tobacco: Never   Vaping Use   • Vaping Use: Never used   Substance and Sexual Activity   • Alcohol use: Yes   • Drug use: No   • Sexual activity: Yes     Birth control/protection: Post-menopausal       Family History:   Family History   Problem Relation Age of Onset   • Lung cancer Father    • No Known Problems Mother    • Prostate cancer Brother 80   • No Known Problems Sister    • Breast cancer Maternal Grandmother 80   • Prostate cancer Maternal Grandfather 70   • Ovarian cancer Neg Hx    • Uterine cancer Neg Hx    • Colon cancer Neg Hx    • Melanoma Neg Hx        REVIEW OF SYSTEMS:  14 point review of systems has been reviewed from the patient's emergency room visit, reviewed with the patient on today's date with no new changes.    PHYSICAL EXAM:      Physical Examination:  Vitals:   Vitals:    04/1955 04/13/23 2359 04/14/23 0420 04/14/23 0700   BP: (!) 85/45 123/65 118/57 106/69   BP Location: Right arm Right arm Right arm Left arm   Patient Position: Lying Lying Lying Sitting   Pulse: 87 74 86 (!) 126   Resp: 18 18 20 20   Temp: 97.7 °F (36.5 °C) 98.2 °F (36.8 °C) 98.4 °F (36.9 °C) 97.6 °F (36.4 °C)   TempSrc: Oral Oral Oral Oral   SpO2: 91% 93% 95% 95%   Weight:   53.4 kg (117 lb 12.8 oz)    Height:         General:  Appears stated age, no distress.  Orientation:  Alert and oriented to time, place, and person.  Mood and Affect:  Cooperative and  pleasant.  Gait:  Resting comfortably in bed.  Cardiovascular:  Symmetric 1-2 plus pulses in upper and lower extremities.  Lymph:  No cervical or inguinal lymphadenopathy noted.  Sensation:  Grossly intact to light touch.  DTR:  Normal, no pathologic reflexes.  Coordination/balance:  Normal    Musculoskeletal:  Right upper extremity exam:  There is no tenderness to palpation about the shoulder, elbow, wrist or hand.  Full motion.  Stability normal with provocative tests, 5/5 strength, and skin is normal.      Left upper extremity exam:  There is no tenderness to palpation about the shoulder, elbow, wrist or hand. Full motion.  Stability normal with provocative tests, 5/5 strength, and skin is normal.     Right lower extremity exam:  There is no tenderness to palpation about the hip, knee, ankle or foot.  Full motion  Stability normal with provocative tests, 5/5 strength, and skin is normal.     Left lower extremity exam: Splint applied. Tender over 5th metacarpal, skin is normal.      DATA:    LAB RESULTS:      Lab 04/14/23  0247 04/13/23  0311 04/12/23  1338 04/11/23  1514   WBC 30.49* 33.45* 31.50* 27.13*   HEMOGLOBIN 12.6 13.5 13.4 13.7   HEMATOCRIT 39.3 41.6 41.0 40.2   PLATELETS 246 305 316 275   NEUTROS ABS 24.64* 27.31* 23.86* 23.02*   EOS ABS 0.30  --   --   --    MCV 91.0 87.8 88.4 86.6   PROCALCITONIN  --   --   --  0.07         Lab 04/13/23  0311 04/12/23  1338 04/12/23  0309 04/11/23  1514   SODIUM 137 136  --  139   POTASSIUM 4.0 4.2 4.4 2.7*   CHLORIDE 100 100  --  98   CO2 30.0* 26.0  --  31.0*   ANION GAP 7.0 10.0  --  10.0   BUN 22 26*  --  12   CREATININE 0.46* 0.52*  --  0.41*   EGFR 105.0 102.0  --  108.0   GLUCOSE 100* 130*  --  115*   CALCIUM 9.7 10.6*  --  10.2   MAGNESIUM  --  2.6*  --  1.7   TSH  --   --   --  0.651         Lab 04/12/23  1338 04/11/23  1514   TOTAL PROTEIN 5.7* 6.3   ALBUMIN 3.5 3.8   GLOBULIN 2.2 2.5   ALT (SGPT) 171* 215*   AST (SGOT) 65* 103*   BILIRUBIN 0.5 0.6   ALK  PHOS 89 95             Lab 04/11/23  1514   CHOLESTEROL 162   LDL CHOL 79   HDL CHOL 66*   TRIGLYCERIDES 93             Brief Urine Lab Results     None        Microbiology Results (last 10 days)     ** No results found for the last 240 hours. **             ----------------------------------------------------------------------------------------------------------------------  I have reviewed the radiology images above and agree with the findings dictated below    Radiology: XR Hand 3+ View Left    Result Date: 4/13/2023  EXAMINATION: XR HAND 3+ VW LEFT-  4/13/2023 1:56 PM CDT  HISTORY: suspected fracture; Z78.9-Other specified health status  Left hand, 3 views.  Overlying splint obscures detail.  Osteopenia.  Displaced fractures of the fourth and fifth metacarpal neck.  No dislocation.  Fingers are intact.  The first through third metacarpals are intact.  Carpal bones align normally.  Summary: 1. Displaced fractures of the left fourth and fifth metacarpal neck. This report was finalized on 04/13/2023 14:16 by Dr. Valentin Bello MD.    XR Chest 1 View    Result Date: 4/11/2023  Frontal upright radiograph of the chest 4/11/2023 2:29 PM CDT  HISTORY: Renal failure  COMPARISON: None.  FINDINGS:  There is a thin reticular opacities identified in both upper lobes, likely fibrotic. No consolidation is seen. Lungs are well expanded. No pleural effusion or pneumothorax. Heart size is normal. Pulmonary vasculature are nondilated. No acute bony abnormality seen.      1. There appear to be mild fibrotic changes in both upper lobes. No superimposed acute process.   This report was finalized on 04/11/2023 14:42 by Dr Car Hernadez, .       ----------------------------------------------------------------------------------------------------------------------  Assessment:    1)  Acute traumatic displaced 4th and 5th metacarpal fracture of the left hand, initial encounter for closed fracture    Atrial fibrillation with rapid  ventricular response    Hypokalemia    Non-traumatic rhabdomyolysis    Nasal fracture    Transaminitis    Closed displaced fracture of fourth metacarpal bone of left hand       Plan:  1) Patient will require surgery next week. Patient to follow up on Monday 4/17 at 10:50 am at [facility] to be scheduled for outpatient surgery   2) Remain in splint.       Electronically signed by Osvaldo Ceballos PA-C on 4/14/2023 at 08:24 CDT

## 2023-04-14 NOTE — PLAN OF CARE
Goal Outcome Evaluation:  Plan of Care Reviewed With: (P) patient        Progress: (P) improving  Outcome Evaluation: (P) OT tx completed this date. Pt in fowlers upon arrival. Pt agreeable to therapy. Pt d/cing to [place] today. Pt completed fowlers<>EOB requiring SBA and completed sit<>stand t/f requiring SBA. Pt completed functional mobility to bathroom requiring SBA and completed all toileting aspects requiring SBA. Pt completed sit<>stand t/f from toilet requiring CGA and required SBA for stand<>step t/f from toilet to sink. Pt completed grooming tasks at ambulatory level while at sink side of washing hands, washing face, and oral hygiene requiring set up. Pt completed functional mobility from bathroom back to bed requiring SBA and required SBA for EOB<> fowlers t/f.

## 2023-04-14 NOTE — THERAPY DISCHARGE NOTE
Acute Care - Physical Therapy Discharge Summary  [facility]       Patient Name: Lilli Trivedi  : 1955  MRN: [id number]    Today's Date: 2023                 Admit Date: 2023      PT Recommendation and Plan    Visit Dx:    ICD-10-CM ICD-9-CM   1. Decreased activities of daily living (ADL)  Z78.9 V49.89   2. Impaired mobility  Z74.09 799.89        Outcome Measures     Row Name 23 1100             How much help from another is currently needed...    Putting on and taking off regular lower body clothing? 3 (P)   -GK      Bathing (including washing, rinsing, and drying) 3 (P)   -GK      Toileting (which includes using toilet bed pan or urinal) 3 (P)   -GK      Putting on and taking off regular upper body clothing 3 (P)   -GK      Taking care of personal grooming (such as brushing teeth) 4 (P)   -GK      Eating meals 4 (P)   -GK      AM-PAC 6 Clicks Score (OT) 20 (P)   -GK         Functional Assessment    Outcome Measure Options AM-PAC 6 Clicks Daily Activity (OT) (P)   -            User Key  (r) = Recorded By, (t) = Taken By, (c) = Cosigned By    Initials Name Provider Type    Mejia Silva OTA Student OT Student                     PT Rehab Goals     Row Name 23 1534             Bed Mobility Goal 1 (PT)    Activity/Assistive Device (Bed Mobility Goal 1, PT) sit to supine/supine to sit  -LY      [place] Level/Cues Needed (Bed Mobility Goal 1, PT) independent  -LY      Time Frame (Bed Mobility Goal 1, PT) long term goal (LTG);10 days  -LY      Progress/Outcomes (Bed Mobility Goal 1, PT) goal not met  -LY         Transfer Goal 1 (PT)    Activity/Assistive Device (Transfer Goal 1, PT) sit-to-stand/stand-to-sit;bed-to-chair/chair-to-bed  -LY      [place] Level/Cues Needed (Transfer Goal 1, PT) standby assist  -LY      Time Frame (Transfer Goal 1, PT) long term goal (LTG);10 days  -LY      Progress/Outcome (Transfer Goal 1, PT) goal not met  -LY         Gait Training Goal 1 (PT)     Activity/Assistive Device (Gait Training Goal 1, PT) gait (walking locomotion);improve balance and speed;decrease fall risk;increase endurance/gait distance;increase energy conservation  -LY      [place] Level (Gait Training Goal 1, PT) standby assist  -LY      Distance (Gait Training Goal 1, PT) 460 ft  -LY      Time Frame (Gait Training Goal 1, PT) long term goal (LTG);10 days  -LY      Progress/Outcome (Gait Training Goal 1, PT) goal not met  -LY            User Key  (r) = Recorded By, (t) = Taken By, (c) = Cosigned By    Initials Name Provider Type Discipline    Elizabeth Arana, PTA Physical Therapist Assistant PT                    PT Discharge Summary  Anticipated Discharge Disposition (PT): home with home health  Reason for Discharge: Discharge from facility  Outcomes Achieved: Refer to plan of care for updates on goals achieved  Discharge Destination: SNF      Elizabeth Le PTA   4/14/2023

## 2023-04-14 NOTE — PLAN OF CARE
Goal Outcome Evaluation:  Plan of Care Reviewed With: patient        Progress: no change  Outcome Evaluation: Patient has not had any c/o pain.  Converted to back to flutter this morning.  Eloquis given.  Cardizem given.  VSS.  Cont. to monitor.  Call for concerns.  Wound care completed on all wounds last night.  call for concerns.

## 2023-04-14 NOTE — THERAPY TREATMENT NOTE
Acute Care - Occupational Therapy Treatment Note  [facility]     Patient Name: Lilli Trivedi  : 1955  MRN: [id number]  Today's Date: 2023             Admit Date: 2023       ICD-10-CM ICD-9-CM   1. Decreased activities of daily living (ADL)  Z78.9 V49.89   2. Impaired mobility  Z74.09 799.89     Patient Active Problem List   Diagnosis   • Seasonal allergic rhinitis   • Atrial fibrillation with rapid ventricular response   • Hypokalemia   • Non-traumatic rhabdomyolysis   • Nasal fracture   • Transaminitis   • Acute traumatic displaced 4th and 5th metacarpal fracture of the left hand     Past Medical History:   Diagnosis Date   • Disease of thyroid gland      Past Surgical History:   Procedure Laterality Date   • HYSTERECTOMY      with BSO   • TONSILLECTOMY           OT ASSESSMENT FLOWSHEET (last 12 hours)     OT Evaluation and Treatment     Row Name 23 1046                   OT Time and Intention    Subjective Information no complaints (P)   -GK        Document Type therapy note (daily note) (P)   -GK        Mode of Treatment occupational therapy (P)   -GK        Patient Effort good (P)   -GK           General Information    Patient Profile Reviewed yes (P)   -GK        Existing Precautions/Restrictions fall;oxygen therapy device and L/min (P)   -GK        Barriers to Rehab previous functional deficit (P)   -GK           Pain Assessment    Pretreatment Pain Rating 0/10 - no pain (P)   -GK        Posttreatment Pain Rating 0/10 - no pain (P)   -GK           Cognition    Orientation Status (Cognition) oriented x 4 (P)   -GK           Wound 23 1200 Right elbow Abrasion    Wound - Properties Group Placement Date: 23  -SC Placement Time: 1200  -SC Present on Hospital Admission: Y  -SC Side: Right  -SC Location: elbow  -SC Primary Wound Type: Abrasion  -SC    Retired Wound - Properties Group Placement Date: 23  -SC Placement Time: 1200  -SC Present on Hospital Admission: Y  -SC Side:  Right  -SC Location: elbow  -SC Primary Wound Type: Abrasion  -SC    Retired Wound - Properties Group Date first assessed: 04/11/23  -SC Time first assessed: 1200  -SC Present on Hospital Admission: Y  -SC Side: Right  -SC Location: elbow  -SC Primary Wound Type: Abrasion  -SC       Wound 04/11/23 1200 Left posterior elbow    Wound - Properties Group Placement Date: 04/11/23  -SC Placement Time: 1200  -SC Present on Hospital Admission: Y  -SC Side: Left  -SC Orientation: posterior  -SC Location: elbow  -SC    Retired Wound - Properties Group Placement Date: 04/11/23  -SC Placement Time: 1200  -SC Present on Hospital Admission: Y  -SC Side: Left  -SC Orientation: posterior  -SC Location: elbow  -SC    Retired Wound - Properties Group Date first assessed: 04/11/23  -SC Time first assessed: 1200  -SC Present on Hospital Admission: Y  -SC Side: Left  -SC Location: elbow  -SC       Wound 04/11/23 1200 Right anterior knee    Wound - Properties Group Placement Date: 04/11/23  -SC Placement Time: 1200  -SC Present on Hospital Admission: Y  -SC Side: Right  -SC Orientation: anterior  -SC Location: knee  -SC    Retired Wound - Properties Group Placement Date: 04/11/23  -SC Placement Time: 1200  -SC Present on Hospital Admission: Y  -SC Side: Right  -SC Orientation: anterior  -SC Location: knee  -SC    Retired Wound - Properties Group Date first assessed: 04/11/23  -SC Time first assessed: 1200  -SC Present on Hospital Admission: Y  -SC Side: Right  -SC Location: knee  -SC       Wound 04/11/23 1200 Right distal ankle    Wound - Properties Group Placement Date: 04/11/23  -SC Placement Time: 1200  -SC Side: Right  -SC Orientation: distal  -SC Location: ankle  -SC    Retired Wound - Properties Group Placement Date: 04/11/23  -SC Placement Time: 1200  -SC Side: Right  -SC Orientation: distal  -SC Location: ankle  -SC    Retired Wound - Properties Group Date first assessed: 04/11/23  -SC Time first assessed: 1200  -SC Side: Right   -SC Location: ankle  -SC       Wound 04/11/23 1200 Left lower leg Laceration    Wound - Properties Group Placement Date: 04/11/23  -SC Placement Time: 1200  -SC Present on Hospital Admission: Y  -SC Side: Left  -SC Orientation: lower  -SC Location: leg  -SC Primary Wound Type: Laceration  -SC    Retired Wound - Properties Group Placement Date: 04/11/23  -SC Placement Time: 1200  -SC Present on Hospital Admission: Y  -SC Side: Left  -SC Orientation: lower  -SC Location: leg  -SC Primary Wound Type: Laceration  -SC    Retired Wound - Properties Group Date first assessed: 04/11/23  -SC Time first assessed: 1200  -SC Present on Hospital Admission: Y  -SC Side: Left  -SC Location: leg  -SC Primary Wound Type: Laceration  -SC       Coping    Observed Emotional State calm;cooperative;pleasant (P)   -GK        Verbalized Emotional State acceptance (P)   -GK           Plan of Care Review    Plan of Care Reviewed With patient (P)   -GK        Progress improving (P)   -GK        Outcome Evaluation OT tx completed this date. Pt in [facility]owlers upon arrival. Pt agreeable to therapy. Pt d/cing to [place] today. Pt completed fowlers<>EOB requiring SBA and completed sit<>stand t/f requiring SBA. Pt completed functional mobility to bathroom requiring SBA and completed all toileting aspects requiring SBA. Pt completed sit<>stand t/f from toilet requiring CGA and required SBA for stand<>step t/f from toilet to sink. Pt completed grooming tasks at ambulatory level while at sink side of washing hands, washing face, and oral hygiene requiring set up. Pt completed functional mobility from bathroom back to bed requiring SBA and required SBA for EOB<> fowlers t/f. (P)   -GK           Positioning and Restraints    Pre-Treatment Position in bed (P)   -GK        Post Treatment Position bed (P)   -GK        In Bed fowlers;call light within reach;encouraged to call for assist (P)   -GK           Therapy Plan Review/Discharge Plan (OT)    Anticipated  Discharge Disposition (OT) sub acute care setting;home with assist;home with home health (P)   -              User Key  (r) = Recorded By, (t) = Taken By, (c) = Cosigned By    Initials Name Effective Dates    Linda Eastman RN 06/16/21 -      Mejia Payne OTA Student 02/20/23 -                  Occupational Therapy Education     Title: PT OT SLP Therapies (In Progress)     Topic: Occupational Therapy (In Progress)     Point: ADL training (Done)     Description:   Instruct learner(s) on proper safety adaptation and remediation techniques during self care or transfers.   Instruct in proper use of assistive devices.              Learning Progress Summary           Patient Acceptance, E, VU by  at 4/14/2023 1133    Comment: Pt educated on safety during functional mobility and on safety during ADL tasks d/t LUE cast.    Acceptance, E, VU by  at 4/13/2023 1411    Acceptance, E, VU by FAROOQ at 4/12/2023 1333                   Point: Home exercise program (Not Started)     Description:   Instruct learner(s) on appropriate technique for monitoring, assisting and/or progressing therapeutic exercises/activities.              Learner Progress:  Not documented in this visit.          Point: Precautions (Done)     Description:   Instruct learner(s) on prescribed precautions during self-care and functional transfers.              Learning Progress Summary           Patient Acceptance, E, VU by FAROOQ at 4/13/2023 1411    Acceptance, E, VU by  at 4/12/2023 1333                   Point: Body mechanics (Not Started)     Description:   Instruct learner(s) on proper positioning and spine alignment during self-care, functional mobility activities and/or exercises.              Learner Progress:  Not documented in this visit.                      User Key     Initials Effective Dates Name Provider Type Discipline     11/10/21 -  Vanessa Leger OTR/L, CSRS Occupational Therapist OT     02/20/23 -  Mejia Payne OTA  Student OT Student OT                  OT Recommendation and Plan     Plan of Care Review  Plan of Care Reviewed With: (P) patient  Progress: (P) improving  Outcome Evaluation: (P) OT tx completed this date. Pt in [facility] upon arrival. Pt agreeable to therapy. Pt d/cing to [facility]. Pt completed fowlers<>EOB requiring SBA and completed sit<>stand t/f requiring SBA. Pt completed functional mobility to bathroom requiring SBA and completed all toileting aspects requiring SBA. Pt completed sit<>stand t/f from toilet requiring CGA and required SBA for stand<>step t/f from toilet to sink. Pt completed grooming tasks at ambulatory level while at sink side of washing hands, washing face, and oral hygiene requiring set up. Pt completed functional mobility from bathroom back to bed requiring SBA and required SBA for EOB<> fowlers t/f.  Plan of Care Reviewed With: (P) patient  Outcome Evaluation: (P) OT tx completed this date. Pt in [facility] upon arrival. Pt agreeable to therapy. Pt d/cing to [place] today. Pt completed fowlers<>EOB requiring SBA and completed sit<>stand t/f requiring SBA. Pt completed functional mobility to bathroom requiring SBA and completed all toileting aspects requiring SBA. Pt completed sit<>stand t/f from toilet requiring CGA and required SBA for stand<>step t/f from toilet to sink. Pt completed grooming tasks at ambulatory level while at sink side of washing hands, washing face, and oral hygiene requiring set up. Pt completed functional mobility from bathroom back to bed requiring SBA and required SBA for EOB<> fowlers t/f.     Outcome Measures     Row Name 04/14/23 1100             How much help from another is currently needed...    Putting on and taking off regular lower body clothing? 3 (P)   -GK      Bathing (including washing, rinsing, and drying) 3 (P)   -GK      Toileting (which includes using toilet bed pan or urinal) 3 (P)   -GK      Putting on and taking off regular upper body clothing  3 (P)   -GK      Taking care of personal grooming (such as brushing teeth) 4 (P)   -      Eating meals 4 (P)   -GK      AM-PAC 6 Clicks Score (OT) 20 (P)   -GK         Functional Assessment    Outcome Measure Options AM-PAC 6 Clicks Daily Activity (OT) (P)   -            User Key  (r) = Recorded By, (t) = Taken By, (c) = Cosigned By    Initials Name Provider Type     Mejia Payne OTA Student OT Student                Time Calculation:    Time Calculation- OT     Row Name 04/14/23 1347             Time Calculation- OT    OT Start Time 1046  -LS      OT Stop Time 1124  -LS      OT Time Calculation (min) 38 min  -      Total Timed Code Minutes- OT 38 minute(s)  -      OT Received On 04/14/23  -            User Key  (r) = Recorded By, (t) = Taken By, (c) = Cosigned By    Initials Name Provider Type     Velma Mendoza COTA Occupational Therapist Assistant                       LUBA Shaw Student  4/14/2023

## 2023-04-14 NOTE — PLAN OF CARE
Problem: Adult Inpatient Plan of Care  Goal: Plan of Care Review  Outcome: Ongoing, Progressing  Flowsheets (Taken 4/13/2023 1910)  Progress: improving  Plan of Care Reviewed With: patient  Outcome Evaluation: Pt has had no c/o pain this shift. Converted to NSR at 1800. Plans to d/c tomorrow. VSS, will update MD as needed

## 2023-04-19 ENCOUNTER — HOSPITAL ENCOUNTER (OUTPATIENT)
Dept: PREADMISSION TESTING | Age: 68
End: 2023-04-19
Payer: MEDICARE

## 2023-04-19 VITALS — BODY MASS INDEX: 16.3 KG/M2 | WEIGHT: 116.4 LBS | HEIGHT: 71 IN

## 2023-04-19 PROBLEM — S62.325A: Status: ACTIVE | Noted: 2023-04-19

## 2023-04-19 RX ORDER — GUAIFENESIN 600 MG/1
1200 TABLET, EXTENDED RELEASE ORAL 2 TIMES DAILY
COMMUNITY

## 2023-04-19 RX ORDER — DILTIAZEM HYDROCHLORIDE 120 MG/1
120 CAPSULE, COATED, EXTENDED RELEASE ORAL DAILY
COMMUNITY

## 2023-04-19 RX ORDER — METOPROLOL TARTRATE 50 MG/1
50 TABLET, FILM COATED ORAL 2 TIMES DAILY PRN
COMMUNITY

## 2023-04-19 RX ORDER — ENOXAPARIN SODIUM 300 MG/3ML
60 INJECTION INTRAVENOUS; SUBCUTANEOUS DAILY
COMMUNITY

## 2023-04-19 NOTE — DISCHARGE INSTRUCTIONS
operatively. 2.   Visitor Expectations and Limitations        VISITORS MUST WEAR MASKS AT ALL TIMES. NO Cloth masks allowed. 3.  One visitor allowed with patients in the preop/postop rooms. 4.  A second visitor may sit in the waiting area. 5.  No children under 13 allowed in the pre-post op areas unless they are the patient. 6.  Two people may be with an underage surgical/procedural patient in preop/postop        room. 7.  If you are admitted to the hospital post operatively, there are NO RESTRICTIONS on       the floor at this time. 8.  If you are admitted to ICU postoperatively, you may have one visitor in the room from        7A-7P. A second visitor may sit in the ICU waiting room. No overnight visitors in         ICU waiting room. 6.  Two people may be with an underage surgical/procedural patient in preop/postop        room. 7.  If you are admitted to the hospital post operatively, there are NO RESTRICTIONS on       the floor at this time. 8.  If you are admitted to ICU postoperatively, you may have one visitor in the room from        7A-7P. A second visitor may sit in the ICU waiting room. No overnight visitors in         ICU waiting room.

## 2023-04-19 NOTE — OP NOTE
Kefzol 2 g was given as perioperative antibiotics. The left upper extremity was prepped and draped in the usual sterile fashion. Tourniquet was placed about her left brachium, inflated to 200 mmHg. Total tourniquet time was less than 30 minutes. An incision was made overlying the dorsum of the hand in line with small finger metacarpal. Soft tissue was dissected down to the level of the fracture which was cleared of soft tissue in position as well as hematoma formation. A point-to-point clamp was used to recreate an anatomic reduction. Synthes Mini frag 2.0-mm straight plate was then applied to the dorsum of the bone with multiple cortical screws being placed through the plate gaining excellent purchase. Attention was turned to the 4th metacarpal neck fracture which was displace and unstable. A closed reduction was performed followed by placement of 0.16 mm k-wire from the tip of the metacarpal head down the intramedullary canal, docking the pin in the base of the metacarpal.   C-arm images were utilized in multiple planes showing adequate reduction of her fracture without hardware complication. The incision was irrigated, followed by closure in layers. The skin was closed with adhesive glue. The K-wire was cut short and a protective ball was placed. A sterile dressing was placed, followed by a well-padded short-arm splint with her MCP joints flexed to 90 degrees and his IP joints straight. The patient was awakened from anesthesia, transported to the recovery room in stable condition. POSTOPERATIVE PLAN: Discharge home with family. Followup in 1 to 2 weeks for a clinical check, x-rays out of his splint, and beginning of range of motion. Will remove pin at 4-5 weeks postop.      Electronically signed by Emma Laura MD on 4/20/2023 at 1:04 PM

## 2023-04-20 ENCOUNTER — ANESTHESIA (OUTPATIENT)
Dept: OPERATING ROOM | Age: 68
End: 2023-04-20
Payer: MEDICARE

## 2023-04-20 ENCOUNTER — APPOINTMENT (OUTPATIENT)
Dept: GENERAL RADIOLOGY | Age: 68
End: 2023-04-20
Attending: ORTHOPAEDIC SURGERY
Payer: MEDICARE

## 2023-04-20 ENCOUNTER — ANESTHESIA EVENT (OUTPATIENT)
Dept: OPERATING ROOM | Age: 68
End: 2023-04-20
Payer: MEDICARE

## 2023-04-20 ENCOUNTER — HOSPITAL ENCOUNTER (OUTPATIENT)
Age: 68
Setting detail: OUTPATIENT SURGERY
Discharge: HOME | End: 2023-04-20
Attending: ORTHOPAEDIC SURGERY | Admitting: ORTHOPAEDIC SURGERY
Payer: MEDICARE

## 2023-04-20 VITALS
OXYGEN SATURATION: 100 % | BODY MASS INDEX: 16.24 KG/M2 | RESPIRATION RATE: 14 BRPM | HEART RATE: 95 BPM | DIASTOLIC BLOOD PRESSURE: 75 MMHG | WEIGHT: 116 LBS | SYSTOLIC BLOOD PRESSURE: 118 MMHG | HEIGHT: 71 IN | TEMPERATURE: 97.6 F

## 2023-04-20 DIAGNOSIS — S62.325A: Primary | ICD-10-CM

## 2023-04-20 PROCEDURE — A4216 STERILE WATER/SALINE, 10 ML: HCPCS | Performed by: ANESTHESIOLOGY

## 2023-04-20 PROCEDURE — C1713 ANCHOR/SCREW BN/BN,TIS/BN: HCPCS | Performed by: ORTHOPAEDIC SURGERY

## 2023-04-20 PROCEDURE — 2500000003 HC RX 250 WO HCPCS: Performed by: ANESTHESIOLOGY

## 2023-04-20 PROCEDURE — 2709999900 HC NON-CHARGEABLE SUPPLY: Performed by: ORTHOPAEDIC SURGERY

## 2023-04-20 PROCEDURE — 2500000003 HC RX 250 WO HCPCS: Performed by: NURSE ANESTHETIST, CERTIFIED REGISTERED

## 2023-04-20 PROCEDURE — 2580000003 HC RX 258: Performed by: NURSE ANESTHETIST, CERTIFIED REGISTERED

## 2023-04-20 PROCEDURE — 2580000003 HC RX 258: Performed by: ANESTHESIOLOGY

## 2023-04-20 PROCEDURE — 3700000001 HC ADD 15 MINUTES (ANESTHESIA): Performed by: ORTHOPAEDIC SURGERY

## 2023-04-20 PROCEDURE — 73130 X-RAY EXAM OF HAND: CPT

## 2023-04-20 PROCEDURE — 2720000010 HC SURG SUPPLY STERILE: Performed by: ORTHOPAEDIC SURGERY

## 2023-04-20 PROCEDURE — 7100000000 HC PACU RECOVERY - FIRST 15 MIN: Performed by: ORTHOPAEDIC SURGERY

## 2023-04-20 PROCEDURE — 6360000002 HC RX W HCPCS: Performed by: NURSE ANESTHETIST, CERTIFIED REGISTERED

## 2023-04-20 PROCEDURE — 6360000002 HC RX W HCPCS: Performed by: ORTHOPAEDIC SURGERY

## 2023-04-20 PROCEDURE — 3600000004 HC SURGERY LEVEL 4 BASE: Performed by: ORTHOPAEDIC SURGERY

## 2023-04-20 PROCEDURE — 7100000011 HC PHASE II RECOVERY - ADDTL 15 MIN: Performed by: ORTHOPAEDIC SURGERY

## 2023-04-20 PROCEDURE — 3700000000 HC ANESTHESIA ATTENDED CARE: Performed by: ORTHOPAEDIC SURGERY

## 2023-04-20 PROCEDURE — 7100000001 HC PACU RECOVERY - ADDTL 15 MIN: Performed by: ORTHOPAEDIC SURGERY

## 2023-04-20 PROCEDURE — 7100000010 HC PHASE II RECOVERY - FIRST 15 MIN: Performed by: ORTHOPAEDIC SURGERY

## 2023-04-20 PROCEDURE — 6370000000 HC RX 637 (ALT 250 FOR IP): Performed by: ORTHOPAEDIC SURGERY

## 2023-04-20 PROCEDURE — 3600000014 HC SURGERY LEVEL 4 ADDTL 15MIN: Performed by: ORTHOPAEDIC SURGERY

## 2023-04-20 PROCEDURE — 6360000002 HC RX W HCPCS: Performed by: ANESTHESIOLOGY

## 2023-04-20 PROCEDURE — 2580000003 HC RX 258: Performed by: ORTHOPAEDIC SURGERY

## 2023-04-20 DEVICE — IMPLANTABLE DEVICE: Type: IMPLANTABLE DEVICE | Site: HAND | Status: FUNCTIONAL

## 2023-04-20 DEVICE — K WIRE FIX L150MM DIA1.6MM S STL TRCR PNT: Type: IMPLANTABLE DEVICE | Site: HAND | Status: FUNCTIONAL

## 2023-04-20 DEVICE — SCREW BNE ST 2X10 MM CRTX LCK W/ STARDRV RECESS SS NS LCP: Type: IMPLANTABLE DEVICE | Site: HAND | Status: FUNCTIONAL

## 2023-04-20 DEVICE — SCREW BNE ST 2X11 MM LCK W/ STARDRV RECESS SS NS LCP: Type: IMPLANTABLE DEVICE | Site: HAND | Status: FUNCTIONAL

## 2023-04-20 RX ORDER — ESMOLOL HYDROCHLORIDE 10 MG/ML
INJECTION INTRAVENOUS PRN
Status: DISCONTINUED | OUTPATIENT
Start: 2023-04-20 | End: 2023-04-20 | Stop reason: SDUPTHER

## 2023-04-20 RX ORDER — DIPHENHYDRAMINE HYDROCHLORIDE 50 MG/ML
12.5 INJECTION INTRAMUSCULAR; INTRAVENOUS
Status: DISCONTINUED | OUTPATIENT
Start: 2023-04-20 | End: 2023-04-20 | Stop reason: HOSPADM

## 2023-04-20 RX ORDER — FENTANYL CITRATE 50 UG/ML
25 INJECTION, SOLUTION INTRAMUSCULAR; INTRAVENOUS
Status: COMPLETED | OUTPATIENT
Start: 2023-04-20 | End: 2023-04-20

## 2023-04-20 RX ORDER — SODIUM CHLORIDE, SODIUM LACTATE, POTASSIUM CHLORIDE, CALCIUM CHLORIDE 600; 310; 30; 20 MG/100ML; MG/100ML; MG/100ML; MG/100ML
INJECTION, SOLUTION INTRAVENOUS CONTINUOUS
Status: DISCONTINUED | OUTPATIENT
Start: 2023-04-20 | End: 2023-04-20 | Stop reason: HOSPADM

## 2023-04-20 RX ORDER — FENTANYL CITRATE 50 UG/ML
50 INJECTION, SOLUTION INTRAMUSCULAR; INTRAVENOUS EVERY 5 MIN PRN
Status: DISCONTINUED | OUTPATIENT
Start: 2023-04-20 | End: 2023-04-20 | Stop reason: HOSPADM

## 2023-04-20 RX ORDER — METOPROLOL TARTRATE 5 MG/5ML
INJECTION INTRAVENOUS PRN
Status: DISCONTINUED | OUTPATIENT
Start: 2023-04-20 | End: 2023-04-20 | Stop reason: SDUPTHER

## 2023-04-20 RX ORDER — SODIUM CHLORIDE 0.9 % (FLUSH) 0.9 %
5-40 SYRINGE (ML) INJECTION EVERY 12 HOURS SCHEDULED
Status: DISCONTINUED | OUTPATIENT
Start: 2023-04-20 | End: 2023-04-20 | Stop reason: HOSPADM

## 2023-04-20 RX ORDER — LIDOCAINE HYDROCHLORIDE 10 MG/ML
1 INJECTION, SOLUTION EPIDURAL; INFILTRATION; INTRACAUDAL; PERINEURAL
Status: COMPLETED | OUTPATIENT
Start: 2023-04-20 | End: 2023-04-20

## 2023-04-20 RX ORDER — HYDROCODONE BITARTRATE AND ACETAMINOPHEN 7.5; 325 MG/1; MG/1
1 TABLET ORAL EVERY 6 HOURS PRN
Qty: 20 TABLET | Refills: 0 | Status: SHIPPED | OUTPATIENT
Start: 2023-04-20 | End: 2023-04-25

## 2023-04-20 RX ORDER — SODIUM CHLORIDE 9 MG/ML
INJECTION, SOLUTION INTRAVENOUS PRN
Status: DISCONTINUED | OUTPATIENT
Start: 2023-04-20 | End: 2023-04-20 | Stop reason: HOSPADM

## 2023-04-20 RX ORDER — DEXAMETHASONE SODIUM PHOSPHATE 10 MG/ML
INJECTION, SOLUTION INTRAMUSCULAR; INTRAVENOUS PRN
Status: DISCONTINUED | OUTPATIENT
Start: 2023-04-20 | End: 2023-04-20 | Stop reason: SDUPTHER

## 2023-04-20 RX ORDER — ONDANSETRON 2 MG/ML
4 INJECTION INTRAMUSCULAR; INTRAVENOUS
Status: DISCONTINUED | OUTPATIENT
Start: 2023-04-20 | End: 2023-04-20 | Stop reason: HOSPADM

## 2023-04-20 RX ORDER — PROPOFOL 10 MG/ML
INJECTION, EMULSION INTRAVENOUS PRN
Status: DISCONTINUED | OUTPATIENT
Start: 2023-04-20 | End: 2023-04-20 | Stop reason: SDUPTHER

## 2023-04-20 RX ORDER — SODIUM CHLORIDE 0.9 % (FLUSH) 0.9 %
5-40 SYRINGE (ML) INJECTION PRN
Status: DISCONTINUED | OUTPATIENT
Start: 2023-04-20 | End: 2023-04-20 | Stop reason: HOSPADM

## 2023-04-20 RX ORDER — SODIUM CHLORIDE, SODIUM LACTATE, POTASSIUM CHLORIDE, CALCIUM CHLORIDE 600; 310; 30; 20 MG/100ML; MG/100ML; MG/100ML; MG/100ML
INJECTION, SOLUTION INTRAVENOUS CONTINUOUS PRN
Status: DISCONTINUED | OUTPATIENT
Start: 2023-04-20 | End: 2023-04-20 | Stop reason: SDUPTHER

## 2023-04-20 RX ORDER — MEPERIDINE HYDROCHLORIDE 25 MG/ML
12.5 INJECTION INTRAMUSCULAR; INTRAVENOUS; SUBCUTANEOUS EVERY 5 MIN PRN
Status: DISCONTINUED | OUTPATIENT
Start: 2023-04-20 | End: 2023-04-20 | Stop reason: HOSPADM

## 2023-04-20 RX ORDER — FENTANYL CITRATE 50 UG/ML
25 INJECTION, SOLUTION INTRAMUSCULAR; INTRAVENOUS EVERY 5 MIN PRN
Status: DISCONTINUED | OUTPATIENT
Start: 2023-04-20 | End: 2023-04-20 | Stop reason: HOSPADM

## 2023-04-20 RX ORDER — ONDANSETRON 4 MG/1
4 TABLET, FILM COATED ORAL EVERY 8 HOURS PRN
Qty: 10 TABLET | Refills: 0 | Status: SHIPPED | OUTPATIENT
Start: 2023-04-20

## 2023-04-20 RX ORDER — FENTANYL CITRATE 50 UG/ML
50 INJECTION, SOLUTION INTRAMUSCULAR; INTRAVENOUS
Status: COMPLETED | OUTPATIENT
Start: 2023-04-20 | End: 2023-04-20

## 2023-04-20 RX ORDER — ONDANSETRON 2 MG/ML
INJECTION INTRAMUSCULAR; INTRAVENOUS PRN
Status: DISCONTINUED | OUTPATIENT
Start: 2023-04-20 | End: 2023-04-20 | Stop reason: SDUPTHER

## 2023-04-20 RX ORDER — PROCHLORPERAZINE EDISYLATE 5 MG/ML
5 INJECTION INTRAMUSCULAR; INTRAVENOUS
Status: DISCONTINUED | OUTPATIENT
Start: 2023-04-20 | End: 2023-04-20 | Stop reason: HOSPADM

## 2023-04-20 RX ORDER — KETOROLAC TROMETHAMINE 30 MG/ML
INJECTION, SOLUTION INTRAMUSCULAR; INTRAVENOUS PRN
Status: DISCONTINUED | OUTPATIENT
Start: 2023-04-20 | End: 2023-04-20 | Stop reason: SDUPTHER

## 2023-04-20 RX ORDER — BISMUTH TRIBROMOPH/PETROLATUM 5"X9"
BANDAGE TOPICAL PRN
Status: DISCONTINUED | OUTPATIENT
Start: 2023-04-20 | End: 2023-04-20 | Stop reason: HOSPADM

## 2023-04-20 RX ORDER — MIDAZOLAM HYDROCHLORIDE 2 MG/2ML
2 INJECTION, SOLUTION INTRAMUSCULAR; INTRAVENOUS
Status: DISCONTINUED | OUTPATIENT
Start: 2023-04-20 | End: 2023-04-20 | Stop reason: HOSPADM

## 2023-04-20 RX ADMIN — FENTANYL CITRATE 50 MCG: 0.05 INJECTION, SOLUTION INTRAMUSCULAR; INTRAVENOUS at 12:05

## 2023-04-20 RX ADMIN — PHENYLEPHRINE HYDROCHLORIDE 300 MCG: 10 INJECTION INTRAVENOUS at 12:27

## 2023-04-20 RX ADMIN — PHENYLEPHRINE HYDROCHLORIDE 300 MCG: 10 INJECTION INTRAVENOUS at 12:34

## 2023-04-20 RX ADMIN — KETOROLAC TROMETHAMINE 30 MG: 30 INJECTION, SOLUTION INTRAMUSCULAR; INTRAVENOUS at 13:05

## 2023-04-20 RX ADMIN — PROPOFOL 125 MG: 10 INJECTION, EMULSION INTRAVENOUS at 12:03

## 2023-04-20 RX ADMIN — ESMOLOL HYDROCHLORIDE 20 MG: 10 INJECTION, SOLUTION INTRAVENOUS at 12:06

## 2023-04-20 RX ADMIN — PHENYLEPHRINE HYDROCHLORIDE 300 MCG: 10 INJECTION INTRAVENOUS at 12:39

## 2023-04-20 RX ADMIN — FAMOTIDINE 20 MG: 10 INJECTION INTRAVENOUS at 10:07

## 2023-04-20 RX ADMIN — MIDAZOLAM HYDROCHLORIDE 2 MG: 1 INJECTION, SOLUTION INTRAMUSCULAR; INTRAVENOUS at 11:52

## 2023-04-20 RX ADMIN — ESMOLOL HYDROCHLORIDE 20 MG: 10 INJECTION, SOLUTION INTRAVENOUS at 12:54

## 2023-04-20 RX ADMIN — SODIUM CHLORIDE, SODIUM LACTATE, POTASSIUM CHLORIDE, AND CALCIUM CHLORIDE: 600; 310; 30; 20 INJECTION, SOLUTION INTRAVENOUS at 13:07

## 2023-04-20 RX ADMIN — METOPROLOL TARTRATE 2.5 MG: 1 INJECTION, SOLUTION INTRAVENOUS at 12:14

## 2023-04-20 RX ADMIN — SODIUM CHLORIDE, SODIUM LACTATE, POTASSIUM CHLORIDE, AND CALCIUM CHLORIDE: 600; 310; 30; 20 INJECTION, SOLUTION INTRAVENOUS at 11:52

## 2023-04-20 RX ADMIN — FENTANYL CITRATE 50 MCG: 0.05 INJECTION, SOLUTION INTRAMUSCULAR; INTRAVENOUS at 12:49

## 2023-04-20 RX ADMIN — PHENYLEPHRINE HYDROCHLORIDE 200 MCG: 10 INJECTION INTRAVENOUS at 12:16

## 2023-04-20 RX ADMIN — LIDOCAINE HYDROCHLORIDE 50 MG: 10 INJECTION, SOLUTION EPIDURAL; INFILTRATION; INTRACAUDAL; PERINEURAL at 12:03

## 2023-04-20 RX ADMIN — PHENYLEPHRINE HYDROCHLORIDE 100 MCG: 10 INJECTION INTRAVENOUS at 12:49

## 2023-04-20 RX ADMIN — PHENYLEPHRINE HYDROCHLORIDE 300 MCG: 10 INJECTION INTRAVENOUS at 12:07

## 2023-04-20 RX ADMIN — SODIUM CHLORIDE, POTASSIUM CHLORIDE, SODIUM LACTATE AND CALCIUM CHLORIDE: 600; 310; 30; 20 INJECTION, SOLUTION INTRAVENOUS at 09:18

## 2023-04-20 RX ADMIN — ONDANSETRON 4 MG: 2 INJECTION INTRAMUSCULAR; INTRAVENOUS at 13:00

## 2023-04-20 RX ADMIN — CEFAZOLIN SODIUM 2000 MG: 1 INJECTION, POWDER, FOR SOLUTION INTRAMUSCULAR; INTRAVENOUS at 12:09

## 2023-04-20 RX ADMIN — PROPOFOL 25 MG: 10 INJECTION, EMULSION INTRAVENOUS at 12:05

## 2023-04-20 RX ADMIN — PHENYLEPHRINE HYDROCHLORIDE 100 MCG: 10 INJECTION INTRAVENOUS at 12:20

## 2023-04-20 RX ADMIN — DEXAMETHASONE SODIUM PHOSPHATE 4 MG: 10 INJECTION, SOLUTION INTRAMUSCULAR; INTRAVENOUS at 12:40

## 2023-04-20 ASSESSMENT — PAIN SCALES - GENERAL
PAINLEVEL_OUTOF10: 1
PAINLEVEL_OUTOF10: 2
PAINLEVEL_OUTOF10: 2

## 2023-04-20 ASSESSMENT — PAIN - FUNCTIONAL ASSESSMENT: PAIN_FUNCTIONAL_ASSESSMENT: NONE - DENIES PAIN

## 2023-04-20 ASSESSMENT — ENCOUNTER SYMPTOMS: SHORTNESS OF BREATH: 0

## 2023-04-20 ASSESSMENT — PAIN DESCRIPTION - LOCATION
LOCATION: FINGER (COMMENT WHICH ONE)
LOCATION: HAND
LOCATION: FINGER (COMMENT WHICH ONE)
LOCATION: FINGER (COMMENT WHICH ONE)

## 2023-04-20 ASSESSMENT — LIFESTYLE VARIABLES: SMOKING_STATUS: 0

## 2023-04-20 ASSESSMENT — PAIN DESCRIPTION - DESCRIPTORS
DESCRIPTORS: ACHING
DESCRIPTORS: ACHING

## 2023-04-20 ASSESSMENT — PAIN DESCRIPTION - ORIENTATION: ORIENTATION: LEFT

## 2023-04-20 ASSESSMENT — PAIN DESCRIPTION - PAIN TYPE
TYPE: SURGICAL PAIN
TYPE: SURGICAL PAIN

## 2023-04-20 NOTE — ANESTHESIA POSTPROCEDURE EVALUATION
Department of Anesthesiology  Postprocedure Note    Patient: Sonal Joy  MRN: [id number]  YOB: 1955  Date of evaluation: 4/20/2023      Procedure Summary     Date: 04/20/23 Room / Location: [address]    Anesthesia Start: 1152 Anesthesia Stop: 7814    Procedure: OPEN REDUCTION INTERNAL FIXATION LEFT 4TH & 5TH METACARPALS (Left: Hand) Diagnosis:       Closed nondisplaced fracture of neck of fourth metacarpal bone of left hand with routine healing, subsequent encounter      (Closed nondisplaced fracture of neck of fourth metacarpal bone of left hand with routine healing, subsequent encounter [K84.073H])    Surgeons: Kenyetta Mark MD Responsible Provider: MINERVA Bernal CRNA    Anesthesia Type: general ASA Status: 3          Anesthesia Type: No value filed.     Ana M Phase I: Ana M Score: 8    Ana M Phase II:        Anesthesia Post Evaluation    Patient location during evaluation: PACU  Patient participation: complete - patient cannot participate  Level of consciousness: awake  Airway patency: patent  Nausea & Vomiting: no nausea and no vomiting  Complications: no  Cardiovascular status: hemodynamically stable  Respiratory status: spontaneous ventilation, face mask, nonlabored ventilation and acceptable  Hydration status: stable  Comments: BP (!) 105/53   Pulse (!) 103   Temp 98.9 °F (37.2 °C) (Temporal)   Resp 10   Ht 5' 11\" (1.803 m)   Wt 116 lb (52.6 kg)   SpO2 100%   BMI 16.18 kg/m²

## 2023-04-20 NOTE — ANESTHESIA PRE PROCEDURE
antiemetics administered. Anesthetic plan and risks discussed with patient.                         Maggi Segovia, DO   4/20/2023

## 2023-04-20 NOTE — H&P
Pt Name: Prince Patrick  MRN: [id number]  YOB: 1955  Date of evaluation: 4/20/2023    H&P including current review of systems was updated in the paper chart and/or the document previously scanned into the record. There have been no significant changes or new problems since the original evaluation. The patient's problems continue and indications for contemplated procedure have not changed.     Electronically signed by Ashok Tristan MD on 4/20/2023 at 9:11 AM

## 2023-04-20 NOTE — ADDENDUM NOTE
Addendum  created 04/20/23 1511 by Chao Salmeron, [address] edited, Orders acknowledged in Narrator

## 2023-04-20 NOTE — PROGRESS NOTES
Spoke with Kamala Walter regarding A-fib and rate. Orders received to give home dose of medication if rate stays up at 120. Other wise ok for discharge.

## 2023-04-20 NOTE — BRIEF OP NOTE
Brief Postoperative Note      Patient: Brando Will  YOB: 1955  MRN: [id number]    Date of Procedure: 4/20/2023    Pre-Op Diagnosis Codes:     * Closed nondisplaced fracture of neck of fourth metacarpal bone of left hand with routine healing, subsequent encounter [S62.365D]    Post-Op Diagnosis: Same       Procedure(s):  OPEN REDUCTION INTERNAL FIXATION LEFT 4TH & 5TH METACARPALS    Surgeon(s):  Kierra Marroquin MD    Assistant:  * No surgical staff found *    Anesthesia: General    Estimated Blood Loss (mL): Minimal    Complications: None    Specimens:   * No specimens in log *    Implants:  * No implants in log *      Drains: * No LDAs found *    Findings: see op note      Electronically signed by Kierra Marroquin MD on 4/20/2023 at 12:58 PM

## 2023-04-28 ENCOUNTER — OFFICE VISIT (OUTPATIENT)
Dept: INTERNAL MEDICINE | Facility: CLINIC | Age: 68
End: 2023-04-28
Payer: MEDICARE

## 2023-04-28 VITALS
RESPIRATION RATE: 18 BRPM | HEIGHT: 71 IN | OXYGEN SATURATION: 100 % | HEART RATE: 84 BPM | WEIGHT: 113 LBS | TEMPERATURE: 98.9 F | DIASTOLIC BLOOD PRESSURE: 70 MMHG | BODY MASS INDEX: 15.82 KG/M2 | SYSTOLIC BLOOD PRESSURE: 127 MMHG

## 2023-04-28 DIAGNOSIS — T79.6XXD TRAUMATIC RHABDOMYOLYSIS, SUBSEQUENT ENCOUNTER: ICD-10-CM

## 2023-04-28 DIAGNOSIS — D72.829 LEUKOCYTOSIS, UNSPECIFIED TYPE: Primary | ICD-10-CM

## 2023-04-28 PROCEDURE — 99214 OFFICE O/P EST MOD 30 MIN: CPT | Performed by: NURSE PRACTITIONER

## 2023-04-28 RX ORDER — HYDROCODONE BITARTRATE AND ACETAMINOPHEN 7.5; 325 MG/1; MG/1
TABLET ORAL
COMMUNITY
Start: 2023-04-20

## 2023-04-28 RX ORDER — ONDANSETRON 4 MG/1
1 TABLET, FILM COATED ORAL EVERY 8 HOURS PRN
COMMUNITY
Start: 2023-04-20

## 2023-04-28 RX ORDER — DILTIAZEM HYDROCHLORIDE 120 MG/1
1 CAPSULE, EXTENDED RELEASE ORAL DAILY
COMMUNITY

## 2023-04-28 RX ORDER — LEVALBUTEROL INHALATION SOLUTION 0.63 MG/3ML
0.63 SOLUTION RESPIRATORY (INHALATION)
COMMUNITY
Start: 2023-04-11

## 2023-04-28 RX ORDER — POTASSIUM BICARBONATE 391 MG/1
10 TABLET, EFFERVESCENT ORAL
COMMUNITY
Start: 2023-04-11

## 2023-04-28 NOTE — PROGRESS NOTES
Subjective     Chief Complaint   Patient presents with   • Abnormal Lab       History of Present Illness  CRUZITO ASHER is a 67-year-old female who presents today for hospital follow-up. She is accompanied by an adult female.    Ms. CRUZITO ASHER was admitted to [facility] on 04/11/2023. She had been in [place] for 2 to 3 days prior to that. She was diagnosed with atrial fibrillation, RVR, fractured her hand and rhabdomyolysis. She was discharged on 04/14/2023. She was told by Dr. King that she had a elevated white blood cell count, which he was concerned. Her white blood count was elevated in the 30s, which she has left shift. A peripheral blood smear done, which revealed prominent leukocytosis with a granulitic shift on the left. She is currently still in rehab at [facility]. She did have labs done while there. She underwent surgery on her hand on 04/14/2023. She had a nondisplaced fracture of the neck and fourth metacarpal of the left hand. She was seen by Dr. Cavazos.    Ms. CRUZITO ASHER states her symptoms started with cinnamon. She messaged me and stated she was ill. She was given azithromycin, Mucinex, and Medrol. She states her symptoms worsened.    Ms. CRUZITO ASHER states she feels better. She is urinating normally. She denies dysuria. She has lost quite a bit of weight. The adult female states that her granddaughter and daughter are bringing the patient food. She states that the patient did not lose her appetite. She also states she is back to her regular weight. She only gained weight because of the fluid and gained weight when she was in the hospital. Her normal weight is 117 pounds. She states at the hospital she weighed 129 pounds. She states her feet and legs were swollen. She started elevating her legs. The patient states that she did urinate frequently and lost total of 12 pounds. She denies muscle cramps or aches. She denies chest pain. She denies taking pain  medications.    Ms. CRUZITO ASHER states she is taking Eliquis. She is breathing well. She denies any chest pains. She states she is taking Cardizem 120 mg for her heart rhythm. She states she will take metoprolol 50 mg at night for her heart rate. She states her blood pressure was 85/45 mmHg. She states she felt dizziness. She states they reduced it to 25 mg if her heart rate gets above 100 bpm.      Review of Systems   Otherwise complete ROS reviewed and negative except as mentioned in the HPI.    Past Medical History:   Past Medical History:   Diagnosis Date   • Disease of thyroid gland      Past Surgical History:  Past Surgical History:   Procedure Laterality Date   • HYSTERECTOMY      with BSO   • TONSILLECTOMY       Social History:  reports that she has never smoked. She has never used smokeless tobacco. She reports current alcohol use. She reports that she does not use drugs.    Family History: family history includes Breast cancer (age of onset: 80) in her maternal grandmother; Lung cancer in her father; No Known Problems in her mother and sister; Prostate cancer (age of onset: 70) in her maternal grandfather; Prostate cancer (age of onset: 80) in her brother.      Allergies:  Allergies   Allergen Reactions   • Barley Grass Shortness Of Breath and Rash   • Broccoli [Brassica Oleracea] Shortness Of Breath and Rash   • Cinnamon Anaphylaxis   • Mustard Shortness Of Breath and Rash     Sore throat    • Peanut-Containing Drug Products Anaphylaxis   • Penicillins Shortness Of Breath     Medications:  Prior to Admission medications    Medication Sig Start Date End Date Taking? Authorizing Provider   dilTIAZem (CARDIZEM) 30 MG tablet 2 tablets. 4/11/23  Yes Provider, MD Abbi   ipratropium (ATROVENT) 0.02 % nebulizer solution 2.5 mL. 4/11/23  Yes Provider, MD Abbi   levalbuterol (XOPENEX) 0.63 MG/3ML nebulizer solution 1 ampule. 4/11/23  Yes Provider, MD Abbi   ondansetron (ZOFRAN) 4 MG tablet Take  1 tablet by mouth Every 8 (Eight) Hours As Needed. 4/20/23  Yes Abbi De Jesus MD   Potassium Bicarb-Citric Acid (Effer-K) 10 MEQ effervescent tablet 10 mEq. 4/11/23  Yes Abbi De Jesus MD   apixaban (ELIQUIS) 5 MG tablet tablet Take 1 tablet by mouth Every 12 (Twelve) Hours. Indications: Atrial Fibrillation 4/14/23   Jennifer Nelson APRN   b complex vitamins capsule Take 1 capsule by mouth Daily.    Abbi De Jesus MD   calcium carbonate (OS-SHAISTA) 600 MG tablet Take 1 tablet by mouth Daily.    Abbi De Jesus MD   Cholecalciferol (VITAMIN D3) 2000 units capsule Take 1 capsule by mouth Daily.    Abbi De Jesus MD   Cholecalciferol 50 MCG (2000 UT) capsule Take 1 capsule by mouth.    Abbi De Jesus MD   dilTIAZem (TIAZAC) 120 MG 24 hr capsule Take 1 capsule by mouth Daily.    Abbi De Jesus MD   dilTIAZem CD (CARDIZEM CD) 120 MG 24 hr capsule Take 1 capsule by mouth Daily. 4/14/23   Jennifer Nelson APRN   guaiFENesin (Mucinex) 600 MG 12 hr tablet Take 2 tablets by mouth 2 (Two) Times a Day. 4/3/23   Ave Canada APRN   HYDROcodone-acetaminophen (NORCO) 7.5-325 MG per tablet  4/20/23   Abbi De Jesus MD   MAGNESIUM PO Take 200 mg by mouth Daily.    Abbi De Jesus MD   metoprolol tartrate (LOPRESSOR) 50 MG tablet Take 1 tablet by mouth Every 12 (Twelve) Hours. 4/14/23   Jennifer Nelson APRN   mupirocin (BACTROBAN) 2 % ointment Apply 1 application topically to the appropriate area as directed Every 12 (Twelve) Hours. 4/14/23   Jennifer Nelson APRN   Potassium Gluconate 2.5 MEQ tablet Take 1 tablet by mouth Daily.    Abbi De Jesus MD   thyroid (ARMOUR) 15 MG tablet Take 1 tablet by mouth Daily.    Abbi De Jesus MD   Zinc 50 MG tablet Take 1 tablet by mouth Daily.    Abbi De Jesus MD   potassium gluconate 595 (99 K) MG tablet tablet Take 1 tablet by mouth Daily.  4/28/23  Abbi De Jesus MD       Objective     Vital Signs: /70  "  Pulse 84   Temp 98.9 °F (37.2 °C)   Resp 18   Ht 180.3 cm (71\")   Wt 51.3 kg (113 lb)   LMP 06/28/2001 (Exact Date)   SpO2 100%   BMI 15.76 kg/m²   Physical Exam  Vitals reviewed.   Constitutional:       Appearance: She is well-developed.      Comments: Thin.    HENT:      Head: Normocephalic and atraumatic.      Right Ear: Tympanic membrane normal.      Left Ear: Tympanic membrane normal.   Eyes:      Pupils: Pupils are equal, round, and reactive to light.   Neck:      Vascular: No JVD.   Cardiovascular:      Rate and Rhythm: Normal rate and regular rhythm.   Pulmonary:      Effort: Pulmonary effort is normal.      Breath sounds: Normal breath sounds.   Abdominal:      General: Bowel sounds are normal.      Palpations: Abdomen is soft.   Musculoskeletal:         General: No deformity.      Cervical back: Normal range of motion and neck supple.      Comments: Left hand in hard splint.    Lymphadenopathy:      Cervical: No cervical adenopathy.   Skin:     General: Skin is warm and dry.      Comments: Ecchymosis over right eye.    Neurological:      Mental Status: She is alert and oriented to person, place, and time.   Psychiatric:         Behavior: Behavior normal.         Thought Content: Thought content normal.         Judgment: Judgment normal.         BMI is below normal parameters (malnutrition). Recommendations: treating the underlying disease process      Results Reviewed:  Glucose   Date Value Ref Range Status   04/13/2023 100 (H) 65 - 99 mg/dL Final     BUN   Date Value Ref Range Status   04/13/2023 22 8 - 23 mg/dL Final     Creatinine   Date Value Ref Range Status   04/13/2023 0.46 (L) 0.57 - 1.00 mg/dL Final     Sodium   Date Value Ref Range Status   04/13/2023 137 136 - 145 mmol/L Final     Potassium   Date Value Ref Range Status   04/13/2023 4.0 3.5 - 5.2 mmol/L Final     Comment:     Slight hemolysis detected by analyzer. Results may be affected.     Chloride   Date Value Ref Range Status "   04/13/2023 100 98 - 107 mmol/L Final     CO2   Date Value Ref Range Status   04/13/2023 30.0 (H) 22.0 - 29.0 mmol/L Final     Calcium   Date Value Ref Range Status   04/13/2023 9.7 8.6 - 10.5 mg/dL Final     ALT (SGPT)   Date Value Ref Range Status   04/12/2023 171 (H) 1 - 33 U/L Final     AST (SGOT)   Date Value Ref Range Status   04/12/2023 65 (H) 1 - 32 U/L Final     WBC   Date Value Ref Range Status   04/14/2023 30.49 (C) 3.40 - 10.80 10*3/mm3 Final     Hematocrit   Date Value Ref Range Status   04/14/2023 39.3 34.0 - 46.6 % Final     Platelets   Date Value Ref Range Status   04/14/2023 246 140 - 450 10*3/mm3 Final     Total Cholesterol   Date Value Ref Range Status   04/11/2023 162 0 - 200 mg/dL Final     Triglycerides   Date Value Ref Range Status   04/11/2023 93 0 - 150 mg/dL Final     HDL Cholesterol   Date Value Ref Range Status   04/11/2023 66 (H) 40 - 60 mg/dL Final     LDL Cholesterol    Date Value Ref Range Status   04/11/2023 79 0 - 100 mg/dL Final     LDL/HDL Ratio   Date Value Ref Range Status   04/11/2023 1.17  Final     Hemoglobin A1C   Date Value Ref Range Status   12/05/2017 5.10 % Final     Comment:     Less than 6.0           Non-Diabetic Range  6.0-7.0                 ADA Therapeutic Target  Greater than 7.0        Action Suggested           Assessment / Plan     Assessment/Plan:  Diagnoses and all orders for this visit:    1. Leukocytosis, unspecified type (Primary)  -     CBC w MANUAL Differential  -     Comprehensive Metabolic Panel    2. Traumatic rhabdomyolysis, subsequent encounter  -     CK    3. Atrial fibrillation  - We advised the patient to monitor her blood pressure at home.  -continue Eliquis      The patient will follow-up in 1 month.    No follow-ups on file. unless patient needs to be seen sooner or acute issues arise.    Code Status: Full.     I have discussed the patient results/orders and and plan/recommendation with them at today's visit.      RASHI Agustin    04/28/2023  Transcribed from ambient dictation for RASHI Agustin by Sonia Tejada.  04/28/23   15:47 CDT    Patient or patient representative verbalized consent to the visit recording.  I have personally performed the services described in this document as transcribed by the above individual, and it is both accurate and complete.  RASHI Agustin  4/28/2023  18:46 CDT

## 2023-04-30 LAB
ALBUMIN SERPL-MCNC: 4 G/DL (ref 3.8–4.8)
ALBUMIN/GLOB SERPL: 1.7 {RATIO} (ref 1.2–2.2)
ALP SERPL-CCNC: 146 IU/L (ref 44–121)
ALT SERPL-CCNC: 35 IU/L (ref 0–32)
AST SERPL-CCNC: 25 IU/L (ref 0–40)
BASOPHILS # BLD AUTO: 0.1 X10E3/UL (ref 0–0.2)
BASOPHILS NFR BLD AUTO: 1 %
BILIRUB SERPL-MCNC: 0.2 MG/DL (ref 0–1.2)
BUN SERPL-MCNC: 15 MG/DL (ref 8–27)
BUN/CREAT SERPL: 22 (ref 12–28)
CALCIUM SERPL-MCNC: 10 MG/DL (ref 8.7–10.3)
CHLORIDE SERPL-SCNC: 100 MMOL/L (ref 96–106)
CK SERPL-CCNC: 38 U/L (ref 32–182)
CO2 SERPL-SCNC: 24 MMOL/L (ref 20–29)
CREAT SERPL-MCNC: 0.69 MG/DL (ref 0.57–1)
EGFRCR SERPLBLD CKD-EPI 2021: 95 ML/MIN/1.73
EOSINOPHIL # BLD AUTO: 0.1 X10E3/UL (ref 0–0.4)
EOSINOPHIL NFR BLD AUTO: 1 %
ERYTHROCYTE [DISTWIDTH] IN BLOOD BY AUTOMATED COUNT: 13.1 % (ref 11.7–15.4)
GLOBULIN SER CALC-MCNC: 2.3 G/DL (ref 1.5–4.5)
GLUCOSE SERPL-MCNC: 72 MG/DL (ref 70–99)
HCT VFR BLD AUTO: 36.6 % (ref 34–46.6)
HGB BLD-MCNC: 12 G/DL (ref 11.1–15.9)
IMM GRANULOCYTES # BLD AUTO: 0.1 X10E3/UL (ref 0–0.1)
IMM GRANULOCYTES NFR BLD AUTO: 1 %
LYMPHOCYTES # BLD AUTO: 1.5 X10E3/UL (ref 0.7–3.1)
LYMPHOCYTES NFR BLD AUTO: 23 %
MCH RBC QN AUTO: 29.7 PG (ref 26.6–33)
MCHC RBC AUTO-ENTMCNC: 32.8 G/DL (ref 31.5–35.7)
MCV RBC AUTO: 91 FL (ref 79–97)
MONOCYTES # BLD AUTO: 0.8 X10E3/UL (ref 0.1–0.9)
MONOCYTES NFR BLD AUTO: 11 %
NEUTROPHILS # BLD AUTO: 4.2 X10E3/UL (ref 1.4–7)
NEUTROPHILS NFR BLD AUTO: 63 %
PLATELET # BLD AUTO: 319 X10E3/UL (ref 150–450)
POTASSIUM SERPL-SCNC: 4.5 MMOL/L (ref 3.5–5.2)
PROT SERPL-MCNC: 6.3 G/DL (ref 6–8.5)
RBC # BLD AUTO: 4.04 X10E6/UL (ref 3.77–5.28)
SODIUM SERPL-SCNC: 136 MMOL/L (ref 134–144)
WBC # BLD AUTO: 6.7 X10E3/UL (ref 3.4–10.8)

## 2023-05-08 ENCOUNTER — OFFICE VISIT (OUTPATIENT)
Dept: INTERNAL MEDICINE | Facility: CLINIC | Age: 68
End: 2023-05-08
Payer: MEDICARE

## 2023-05-08 VITALS
HEIGHT: 71 IN | DIASTOLIC BLOOD PRESSURE: 79 MMHG | WEIGHT: 113 LBS | HEART RATE: 76 BPM | BODY MASS INDEX: 15.82 KG/M2 | SYSTOLIC BLOOD PRESSURE: 142 MMHG | OXYGEN SATURATION: 99 % | RESPIRATION RATE: 18 BRPM | TEMPERATURE: 98.2 F

## 2023-05-08 DIAGNOSIS — R05.1 ACUTE COUGH: Primary | ICD-10-CM

## 2023-05-08 DIAGNOSIS — J40 BRONCHITIS: ICD-10-CM

## 2023-05-08 PROCEDURE — 99213 OFFICE O/P EST LOW 20 MIN: CPT | Performed by: NURSE PRACTITIONER

## 2023-05-08 RX ORDER — FLUCONAZOLE 200 MG/1
200 TABLET ORAL DAILY
Qty: 5 TABLET | Refills: 0 | Status: SHIPPED | OUTPATIENT
Start: 2023-05-08

## 2023-05-08 RX ORDER — AZITHROMYCIN 250 MG/1
TABLET, FILM COATED ORAL
Qty: 6 TABLET | Refills: 0 | Status: SHIPPED | OUTPATIENT
Start: 2023-05-08

## 2023-05-08 RX ORDER — ALBUTEROL SULFATE 90 UG/1
2 AEROSOL, METERED RESPIRATORY (INHALATION) EVERY 4 HOURS PRN
Qty: 8 G | Refills: 1 | Status: SHIPPED | OUTPATIENT
Start: 2023-05-08

## 2023-05-08 NOTE — PROGRESS NOTES
Subjective     Chief Complaint   Patient presents with   • Cough     Patient states that she is here today for a cough. She states that she has had it and will not go away and wants to be sure she dont need meds.       History of Present Illness    Lilli Trivedi presents to the clinic today for evaluation of a productive cough.     Ms. Trivedi has been experiencing a productive cough and congestion for approximately 1 week. She describes her sputum as frothy and states it was initially yellow in color. The patient has had to get up often during the night to cough up phlegm. She has been experiencing significant congestion, but she denies any fevers. She has been discharged from rehab and did notice her cough worsening while she was in the nursing home. She has also been experiencing shortness of breath. The patient has been taking Mucinex, but she does not have an inhaler. She is not a current smoker.    Patient's PMR from outside medical facility reviewed and noted.    Review of Systems   Respiratory: Positive for cough.       Otherwise complete ROS reviewed and negative except as mentioned in the HPI.    Past Medical History:   Past Medical History:   Diagnosis Date   • Disease of thyroid gland      Past Surgical History:  Past Surgical History:   Procedure Laterality Date   • HYSTERECTOMY      with BSO   • TONSILLECTOMY       Social History:  reports that she has never smoked. She has never used smokeless tobacco. She reports current alcohol use. She reports that she does not use drugs.    Family History: family history includes Breast cancer (age of onset: 80) in her maternal grandmother; Lung cancer in her father; No Known Problems in her mother and sister; Prostate cancer (age of onset: 70) in her maternal grandfather; Prostate cancer (age of onset: 80) in her brother.      Allergies:  Allergies   Allergen Reactions   • Barley Grass Shortness Of Breath and Rash   • Broccoli [Brassica Oleracea] Shortness Of  Breath and Rash   • Cinnamon Anaphylaxis   • Mustard Shortness Of Breath and Rash     Sore throat    • Mustard Seed Anaphylaxis   • Peanut-Containing Drug Products Anaphylaxis   • Penicillins Shortness Of Breath     Medications:  Prior to Admission medications    Medication Sig Start Date End Date Taking? Authorizing Provider   apixaban (ELIQUIS) 5 MG tablet tablet Take 1 tablet by mouth Every 12 (Twelve) Hours. Indications: Atrial Fibrillation 4/14/23  Yes Jennifer Nelson APRN   b complex vitamins capsule Take 1 capsule by mouth Daily.   Yes ProviderAbbi MD   calcium carbonate (OS-SHAISTA) 600 MG tablet Take 1 tablet by mouth Daily.   Yes Provider, MD Abbi   Cholecalciferol (VITAMIN D3) 2000 units capsule Take 1 capsule by mouth Daily.   Yes ProviderAbbi MD   Cholecalciferol 50 MCG (2000 UT) capsule Take 1 capsule by mouth.   Yes ProviderAbbi MD   dilTIAZem (CARDIZEM) 30 MG tablet 2 tablets. 4/11/23  Yes ProviderAbbi MD   dilTIAZem (TIAZAC) 120 MG 24 hr capsule Take 1 capsule by mouth Daily.   Yes Provider, MD Abbi   dilTIAZem CD (CARDIZEM CD) 120 MG 24 hr capsule Take 1 capsule by mouth Daily. 4/14/23  Yes Jennifer Nelson APRN   guaiFENesin (Mucinex) 600 MG 12 hr tablet Take 2 tablets by mouth 2 (Two) Times a Day. 4/3/23  Yes Ave Canada APRN   HYDROcodone-acetaminophen (NORCO) 7.5-325 MG per tablet  4/20/23  Yes ProviderAbbi MD   ipratropium (ATROVENT) 0.02 % nebulizer solution 2.5 mL. 4/11/23  Yes Provider, MD Abbi   levalbuterol (XOPENEX) 0.63 MG/3ML nebulizer solution 1 ampule. 4/11/23  Yes ProviderAbbi MD   MAGNESIUM PO Take 200 mg by mouth Daily.   Yes ProviderAbbi MD   metoprolol tartrate (LOPRESSOR) 50 MG tablet Take 1 tablet by mouth Every 12 (Twelve) Hours. 4/14/23  Yes Jennifer Nelson APRN   mupirocin (BACTROBAN) 2 % ointment Apply 1 application topically to the appropriate area as directed Every 12 (Twelve) Hours.  "4/14/23  Yes Jennifer Nelson APRN   ondansetron (ZOFRAN) 4 MG tablet Take 1 tablet by mouth Every 8 (Eight) Hours As Needed. 4/20/23  Yes Abbi De Jesus MD   Potassium Bicarb-Citric Acid (Effer-K) 10 MEQ effervescent tablet 10 mEq. 4/11/23  Yes Abbi De Jesus MD   Potassium Gluconate 2.5 MEQ tablet Take 1 tablet by mouth Daily.   Yes Abbi De Jesus MD   thyroid (ARMOUR) 15 MG tablet Take 1 tablet by mouth Daily.   Yes Neal, MD Abbi   Zinc 50 MG tablet Take 1 tablet by mouth Daily.   Yes Abbi De Jesus MD       Objective     Vital Signs: /79 (BP Location: Right arm, Patient Position: Sitting, Cuff Size: Adult)   Pulse 76   Temp 98.2 °F (36.8 °C) (Temporal)   Resp 18   Ht 180.3 cm (71\")   Wt 51.3 kg (113 lb)   LMP 06/28/2001 (Exact Date)   SpO2 99%   BMI 15.76 kg/m²   Physical Exam  Constitutional:       Appearance: She is well-developed.   HENT:      Head: Normocephalic and atraumatic.   Eyes:      Conjunctiva/sclera: Conjunctivae normal.      Pupils: Pupils are equal, round, and reactive to light.   Neck:      Vascular: No JVD.   Cardiovascular:      Rate and Rhythm: Normal rate and regular rhythm.      Heart sounds: Normal heart sounds. No murmur heard.    No friction rub. No gallop.   Pulmonary:      Effort: Pulmonary effort is normal. No respiratory distress.      Breath sounds: Wheezing and rhonchi present. No rales.   Chest:      Chest wall: No tenderness.   Abdominal:      General: Bowel sounds are normal. There is no distension.      Palpations: Abdomen is soft.      Tenderness: There is no abdominal tenderness. There is no guarding or rebound.   Musculoskeletal:         General: No tenderness or deformity. Normal range of motion.      Cervical back: Neck supple.   Skin:     General: Skin is warm and dry.      Findings: No rash.   Neurological:      Mental Status: She is alert and oriented to person, place, and time.      Cranial Nerves: No cranial nerve " deficit.      Motor: No abnormal muscle tone.      Deep Tendon Reflexes: Reflexes normal.   Psychiatric:         Behavior: Behavior normal.         Thought Content: Thought content normal.         Judgment: Judgment normal.         BMI is below normal parameters (malnutrition). Recommendations: none (medical contraindication)      Results Reviewed:  Glucose   Date Value Ref Range Status   04/28/2023 72 70 - 99 mg/dL Final   04/13/2023 100 (H) 65 - 99 mg/dL Final     BUN   Date Value Ref Range Status   04/28/2023 15 8 - 27 mg/dL Final   04/13/2023 22 8 - 23 mg/dL Final     Creatinine   Date Value Ref Range Status   04/28/2023 0.69 0.57 - 1.00 mg/dL Final   04/13/2023 0.46 (L) 0.57 - 1.00 mg/dL Final     Sodium   Date Value Ref Range Status   04/28/2023 136 134 - 144 mmol/L Final   04/13/2023 137 136 - 145 mmol/L Final     Potassium   Date Value Ref Range Status   04/28/2023 4.5 3.5 - 5.2 mmol/L Final   04/13/2023 4.0 3.5 - 5.2 mmol/L Final     Comment:     Slight hemolysis detected by analyzer. Results may be affected.     Chloride   Date Value Ref Range Status   04/28/2023 100 96 - 106 mmol/L Final   04/13/2023 100 98 - 107 mmol/L Final     CO2   Date Value Ref Range Status   04/13/2023 30.0 (H) 22.0 - 29.0 mmol/L Final     Total CO2   Date Value Ref Range Status   04/28/2023 24 20 - 29 mmol/L Final     Calcium   Date Value Ref Range Status   04/28/2023 10.0 8.7 - 10.3 mg/dL Final   04/13/2023 9.7 8.6 - 10.5 mg/dL Final     ALT (SGPT)   Date Value Ref Range Status   04/28/2023 35 (H) 0 - 32 IU/L Final   04/12/2023 171 (H) 1 - 33 U/L Final     AST (SGOT)   Date Value Ref Range Status   04/28/2023 25 0 - 40 IU/L Final   04/12/2023 65 (H) 1 - 32 U/L Final     WBC   Date Value Ref Range Status   04/28/2023 6.7 3.4 - 10.8 x10E3/uL Final     Hematocrit   Date Value Ref Range Status   04/28/2023 36.6 34.0 - 46.6 % Final   04/14/2023 39.3 34.0 - 46.6 % Final     Platelets   Date Value Ref Range Status   04/28/2023 319 150 -  450 x10E3/uL Final   04/14/2023 246 140 - 450 10*3/mm3 Final     Total Cholesterol   Date Value Ref Range Status   04/11/2023 162 0 - 200 mg/dL Final     Triglycerides   Date Value Ref Range Status   04/11/2023 93 0 - 150 mg/dL Final     HDL Cholesterol   Date Value Ref Range Status   04/11/2023 66 (H) 40 - 60 mg/dL Final     LDL Cholesterol    Date Value Ref Range Status   04/11/2023 79 0 - 100 mg/dL Final     LDL/HDL Ratio   Date Value Ref Range Status   04/11/2023 1.17  Final     Hemoglobin A1C   Date Value Ref Range Status   12/05/2017 5.10 % Final     Comment:     Less than 6.0           Non-Diabetic Range  6.0-7.0                 ADA Therapeutic Target  Greater than 7.0        Action Suggested           Assessment / Plan     Assessment/Plan:  Diagnoses and all orders for this visit:    1. Acute cough (Primary)  -     XR Chest PA & Lateral (In Office)  -     albuterol sulfate  (90 Base) MCG/ACT inhaler; Inhale 2 puffs Every 4 (Four) Hours As Needed for Wheezing.  Dispense: 8 g; Refill: 1  -     azithromycin (Zithromax Z-Pardeep) 250 MG tablet; Take 2 tablets the first day, then 1 tablet daily for 4 days.  Dispense: 6 tablet; Refill: 0    2. Bronchitis  -     azithromycin (Zithromax Z-Pardeep) 250 MG tablet; Take 2 tablets the first day, then 1 tablet daily for 4 days.  Dispense: 6 tablet; Refill: 0  -     fluconazole (Diflucan) 200 MG tablet; Take 1 tablet by mouth Daily.  Dispense: 5 tablet; Refill: 0    CXR shows no acute process.     No follow-ups on file. unless patient needs to be seen sooner or acute issues arise.    Code Status: Full.    I have discussed the patient results/orders and and plan/recommendation with them at today's visit.      Transcribed from ambient dictation for RASHI Agustin by Vanessa Roberts.  05/08/23   15:03 CDT    Patient or patient representative verbalized consent to the visit recording.  I have personally performed the services described in this document as  transcribed by the above individual, and it is both accurate and complete.  Vanessa Regalado, APRN  5/8/2023  16:57 CDT

## 2023-05-09 NOTE — TELEPHONE ENCOUNTER
Rx Refill Note  Requested Prescriptions     Pending Prescriptions Disp Refills   • dilTIAZem (CARDIZEM) 30 MG tablet       Si tablets.      Last office visit with prescribing clinician: 2023   Last telemedicine visit with prescribing clinician: 2023   Next office visit with prescribing clinician: 2023                         Would you like a call back once the refill request has been completed: [] Yes [] No    If the office needs to give you a call back, can they leave a voicemail: [] Yes [] No    Anna Franco MA  23, 14:27 CDT

## 2023-05-09 NOTE — TELEPHONE ENCOUNTER
Pt called wanting filled and pt didn't know she needed the following, ROSEMARIE didn't get this sent in to pharmacy.  Send in to [facility].      dilTIAZem (CARDIZEM) 30 MG tablet [4305] (Order [id number])

## 2023-05-10 ENCOUNTER — TELEPHONE (OUTPATIENT)
Dept: INTERNAL MEDICINE | Facility: CLINIC | Age: 68
End: 2023-05-10
Payer: MEDICARE

## 2023-05-10 NOTE — TELEPHONE ENCOUNTER
[facility] called asking about diltiazem Rx. 30mg tablets called in, but patient left hospital with 24 hr 120 mg tablets and her daughter is wanting to make sure she doesn't need to continue with the 120 mg dose. I advised I would consult with Malika, her PCP, and call the pharmacy back tomorrow with determination.

## 2023-05-11 ENCOUNTER — TELEPHONE (OUTPATIENT)
Dept: INTERNAL MEDICINE | Facility: CLINIC | Age: 68
End: 2023-05-11
Payer: MEDICARE

## 2023-05-11 DIAGNOSIS — I48.0 PAROXYSMAL ATRIAL FIBRILLATION: Primary | ICD-10-CM

## 2023-05-11 RX ORDER — DILTIAZEM HYDROCHLORIDE 120 MG/1
120 CAPSULE, EXTENDED RELEASE ORAL DAILY
Qty: 90 CAPSULE | Refills: 1 | Status: CANCELLED | OUTPATIENT
Start: 2023-05-11

## 2023-05-11 RX ORDER — DILTIAZEM HYDROCHLORIDE 120 MG/1
120 CAPSULE, COATED, EXTENDED RELEASE ORAL DAILY
Qty: 90 CAPSULE | Refills: 1 | Status: SHIPPED | OUTPATIENT
Start: 2023-05-11

## 2023-05-11 NOTE — TELEPHONE ENCOUNTER
Pt called requesting status of her medication change as she is currently out of.  I told pt Malika is still finishing up her day and will get to her messages as soon as she can.  Pt voiced understanding asking if she can have before tomorrow morning?

## 2023-05-30 ENCOUNTER — OFFICE VISIT (OUTPATIENT)
Dept: INTERNAL MEDICINE | Facility: CLINIC | Age: 68
End: 2023-05-30

## 2023-05-30 VITALS
HEIGHT: 71 IN | SYSTOLIC BLOOD PRESSURE: 115 MMHG | DIASTOLIC BLOOD PRESSURE: 78 MMHG | BODY MASS INDEX: 15.68 KG/M2 | TEMPERATURE: 97.7 F | RESPIRATION RATE: 17 BRPM | OXYGEN SATURATION: 99 % | HEART RATE: 82 BPM | WEIGHT: 112 LBS

## 2023-05-30 DIAGNOSIS — E87.6 HYPOKALEMIA: ICD-10-CM

## 2023-05-30 DIAGNOSIS — Z11.59 ENCOUNTER FOR HEPATITIS C SCREENING TEST FOR LOW RISK PATIENT: Primary | ICD-10-CM

## 2023-05-30 DIAGNOSIS — Z23 NEED FOR VACCINATION: ICD-10-CM

## 2023-05-30 PROBLEM — S62.325A: Status: ACTIVE | Noted: 2023-04-19

## 2023-05-30 PROBLEM — J18.9 PNEUMONIA: Status: ACTIVE | Noted: 2023-05-30

## 2023-05-30 NOTE — PROGRESS NOTES
Subjective     Chief Complaint   Patient presents with    Leukocytosis       History of Present Illness  Ms. CRUZITO ASHER is a 67-year-old female who presents today for a follow-up. She states she is feeling well overall.    She previously experienced muscle aches and falls with injuries. Her white blood cell count was elevated, she had rhabdomyolysis and visited [facility]. She also had renal failure and was admitted to [facility] ICU for an extended period of time, she attended rehab post discharge. Her Alkaline phosphatase has decreased from 171 mg/dL to 35 mg/dL.    She underwent hand surgery in 04/2023. She states that she is scheduled to begin physical therapy with a hand specialist once weekly at the [facility] on 06/01/2023. She reports having the cast and a pin removed from her hand on 05/25/2023, now she is using a braced only. She reports being able to move her hand when the brace is off. She reports being unable to move her fifth digit very well yet.    She reports having pneumonia previously and continuing to use her inhaler as needed. She denies any fevers, fatigue, coughing, shortness of breath, chest pain, palpitations, leg swelling, abdominal pain, constipation, diarrhea, nausea, vomiting, polyuria, polydipsia, numbness, tingling, tremors, weakness, headaches, dizziness, difficulty ambulating, joint swelling, or muscle pain.     Ms. CRUZITO ASHER states she can tell when her atrial fibrillation is elevated. She has found that certain foods trigger her atrial fibrillation. She has eliminated salt from her diet due to edema while pregnant. She denies consuming excess amounts of carbohydrates. She does not eat sweets. She reports a good appetite and being as active as possible. She reports drinking plenty of water. She does not drink milk and she has eliminated dairy since having pneumonia because it created more mucus for her at that time.    She inquires about the risks and  benefits of receiving a pneumonia vaccine.    She reports visiting primary care in [place].    She states she was prescribed Eliquis by Dr. Barnard and was instructed to take Eliquis 5 mg in the morning and 5 mg at night after her fall. She suspects this dose is too high due to experiencing epistaxis and blood being visible beneath her skin. She has not yet notified Dr. Barnard of her symptoms. She reports she has been adjusting her Eliquis dose on her own as she feels appropriate. She expresses her desire to try turmeric or vitamin D3 as blood thinners.      Patient's PMR from outside medical facility reviewed and noted.    Review of Systems   Constitutional: Positive for unexpected weight change (losing). Negative for activity change, appetite change, fatigue and fever.   HENT: Positive for nosebleeds.    Respiratory: Negative for apnea, cough, choking, chest tightness, shortness of breath, wheezing and stridor.    Cardiovascular: Positive for palpitations (atrial fibrillation). Negative for chest pain and leg swelling.   Gastrointestinal: Negative for abdominal distention, abdominal pain, constipation, diarrhea, nausea and vomiting.   Endocrine: Negative for polydipsia, polyphagia and polyuria.   Musculoskeletal: Positive for arthralgias (left wrist brace-postop) and joint swelling (left hand only). Negative for gait problem, myalgias, neck pain and neck stiffness.   Neurological: Positive for weakness (left hand-post op). Negative for dizziness, tremors, seizures, syncope, speech difficulty, light-headedness, numbness and headaches.        Otherwise complete ROS reviewed and negative except as mentioned in the HPI.    Past Medical History:   Past Medical History:   Diagnosis Date    Disease of thyroid gland      Past Surgical History:  Past Surgical History:   Procedure Laterality Date    HYSTERECTOMY      with BSO    TONSILLECTOMY       Social History:  reports that she has never smoked. She has never used  smokeless tobacco. She reports current alcohol use. She reports that she does not use drugs.    Family History: family history includes Breast cancer (age of onset: 80) in her maternal grandmother; Lung cancer in her father; No Known Problems in her mother and sister; Prostate cancer (age of onset: 70) in her maternal grandfather; Prostate cancer (age of onset: 80) in her brother.       Allergies:  Allergies   Allergen Reactions    Barley Grass Shortness Of Breath and Rash    Broccoli [Brassica Oleracea] Shortness Of Breath and Rash    Cinnamon Anaphylaxis    Mustard Shortness Of Breath and Rash     Sore throat     Mustard Seed Anaphylaxis    Peanut-Containing Drug Products Anaphylaxis    Penicillins Shortness Of Breath     Medications:  Prior to Admission medications    Medication Sig Start Date End Date Taking? Authorizing Provider   albuterol sulfate  (90 Base) MCG/ACT inhaler Inhale 2 puffs Every 4 (Four) Hours As Needed for Wheezing. 5/8/23  Yes Vanessa Regalado APRN   apixaban (ELIQUIS) 5 MG tablet tablet Take 1 tablet by mouth Every 12 (Twelve) Hours. Indications: Atrial Fibrillation  Patient taking differently: Take 1 tablet by mouth 1 (One) Time. Indications: Atrial Fibrillation 4/14/23  Yes Jennifer Nelson APRN   b complex vitamins capsule Take 1 capsule by mouth Daily.   Yes Abbi De Jesus MD   calcium carbonate (OS-SHAISTA) 600 MG tablet Take 1 tablet by mouth Daily.   Yes Abbi De Jesus MD   Cholecalciferol (VITAMIN D3) 2000 units capsule Take 1 capsule by mouth Daily.   Yes Abbi De Jesus MD   Cholecalciferol 50 MCG (2000 UT) capsule Take 1 capsule by mouth.   Yes Abbi De Jesus MD   guaiFENesin (Mucinex) 600 MG 12 hr tablet Take 2 tablets by mouth 2 (Two) Times a Day. 4/3/23  Yes Ave Canada APRN   ipratropium (ATROVENT) 0.02 % nebulizer solution 2.5 mL. 4/11/23  Yes Abbi De Jesus MD   levalbuterol (XOPENEX) 0.63 MG/3ML nebulizer solution 1  "ampule. 4/11/23  Yes Abbi De Jesus MD   MAGNESIUM PO Take 200 mg by mouth Daily.   Yes Abbi De Jesus MD   metoprolol tartrate (LOPRESSOR) 50 MG tablet Take 1 tablet by mouth Every 12 (Twelve) Hours. 4/14/23  Yes Jennifer Nelson APRN   Potassium Bicarb-Citric Acid (Effer-K) 10 MEQ effervescent tablet 10 mEq. 4/11/23  Yes Abbi De Jesus MD   Potassium Gluconate 2.5 MEQ tablet Take 1 tablet by mouth Daily.   Yes Abbi De Jesus MD   Zinc 50 MG tablet Take 1 tablet by mouth Daily.   Yes Abbi De Jesus MD   azithromycin (Zithromax Z-Pardeep) 250 MG tablet Take 2 tablets the first day, then 1 tablet daily for 4 days. 5/8/23   Vanessa Regalado APRN   dilTIAZem CD (CARDIZEM CD) 120 MG 24 hr capsule Take 1 capsule by mouth Daily.  Patient not taking: Reported on 5/30/2023 5/11/23   Vanessa Regalado APRN   fluconazole (Diflucan) 200 MG tablet Take 1 tablet by mouth Daily. 5/8/23   Vanessa Regalado APRN   HYDROcodone-acetaminophen (NORCO) 7.5-325 MG per tablet  4/20/23   Abbi De Jesus MD   mupirocin (BACTROBAN) 2 % ointment Apply 1 application topically to the appropriate area as directed Every 12 (Twelve) Hours.  Patient not taking: Reported on 5/30/2023 4/14/23   Jennifer Nelson APRN   ondansetron (ZOFRAN) 4 MG tablet Take 1 tablet by mouth Every 8 (Eight) Hours As Needed.  Patient not taking: Reported on 5/30/2023 4/20/23   Abbi De Jesus MD   thyroid (ARMOUR) 15 MG tablet Take 1 tablet by mouth Daily.  Patient not taking: Reported on 5/30/2023    Abbi De Jesus MD       Objective     Vital Signs: /78   Pulse 82   Temp 97.7 °F (36.5 °C)   Resp 17   Ht 180.3 cm (71\")   Wt 50.8 kg (112 lb)   LMP 06/28/2001 (Exact Date)   SpO2 99%   BMI 15.62 kg/m²   Physical Exam  Constitutional:       Appearance: Normal appearance.   HENT:      Mouth/Throat:      Mouth: Mucous membranes are moist.   Eyes:      Pupils: Pupils are equal, round, and reactive " to light.   Cardiovascular:      Rate and Rhythm: Normal rate and regular rhythm.      Pulses: Normal pulses.      Heart sounds: Normal heart sounds.   Pulmonary:      Effort: Pulmonary effort is normal.      Breath sounds: Normal breath sounds.   Abdominal:      General: Abdomen is flat.   Musculoskeletal:      Cervical back: Normal range of motion and neck supple.   Neurological:      General: No focal deficit present.      Mental Status: She is alert and oriented to person, place, and time.       BMI is below normal parameters (malnutrition). Recommendations: treating the underlying disease process      Results Reviewed:  Glucose   Date Value Ref Range Status   04/28/2023 72 70 - 99 mg/dL Final   04/13/2023 100 (H) 65 - 99 mg/dL Final     BUN   Date Value Ref Range Status   04/28/2023 15 8 - 27 mg/dL Final   04/13/2023 22 8 - 23 mg/dL Final     Creatinine   Date Value Ref Range Status   04/28/2023 0.69 0.57 - 1.00 mg/dL Final   04/13/2023 0.46 (L) 0.57 - 1.00 mg/dL Final     Sodium   Date Value Ref Range Status   04/28/2023 136 134 - 144 mmol/L Final   04/13/2023 137 136 - 145 mmol/L Final     Potassium   Date Value Ref Range Status   04/28/2023 4.5 3.5 - 5.2 mmol/L Final   04/13/2023 4.0 3.5 - 5.2 mmol/L Final     Comment:     Slight hemolysis detected by analyzer. Results may be affected.     Chloride   Date Value Ref Range Status   04/28/2023 100 96 - 106 mmol/L Final   04/13/2023 100 98 - 107 mmol/L Final     CO2   Date Value Ref Range Status   04/13/2023 30.0 (H) 22.0 - 29.0 mmol/L Final     Total CO2   Date Value Ref Range Status   04/28/2023 24 20 - 29 mmol/L Final     Calcium   Date Value Ref Range Status   04/28/2023 10.0 8.7 - 10.3 mg/dL Final   04/13/2023 9.7 8.6 - 10.5 mg/dL Final     ALT (SGPT)   Date Value Ref Range Status   04/28/2023 35 (H) 0 - 32 IU/L Final   04/12/2023 171 (H) 1 - 33 U/L Final     AST (SGOT)   Date Value Ref Range Status   04/28/2023 25 0 - 40 IU/L Final   04/12/2023 65 (H) 1 -  32 U/L Final     WBC   Date Value Ref Range Status   04/28/2023 6.7 3.4 - 10.8 x10E3/uL Final     Hematocrit   Date Value Ref Range Status   04/28/2023 36.6 34.0 - 46.6 % Final   04/14/2023 39.3 34.0 - 46.6 % Final     Platelets   Date Value Ref Range Status   04/28/2023 319 150 - 450 x10E3/uL Final   04/14/2023 246 140 - 450 10*3/mm3 Final     Total Cholesterol   Date Value Ref Range Status   04/11/2023 162 0 - 200 mg/dL Final     Triglycerides   Date Value Ref Range Status   04/11/2023 93 0 - 150 mg/dL Final     HDL Cholesterol   Date Value Ref Range Status   04/11/2023 66 (H) 40 - 60 mg/dL Final     LDL Cholesterol    Date Value Ref Range Status   04/11/2023 79 0 - 100 mg/dL Final     LDL/HDL Ratio   Date Value Ref Range Status   04/11/2023 1.17  Final     Hemoglobin A1C   Date Value Ref Range Status   12/05/2017 5.10 % Final     Comment:     Less than 6.0           Non-Diabetic Range  6.0-7.0                 ADA Therapeutic Target  Greater than 7.0        Action Suggested           Assessment / Plan     Assessment/Plan:  Diagnoses and all orders for this visit:    1. Encounter for hepatitis C screening test for low risk patient (Primary)  Comments:  We will place an order for blood work.      2. Hypokalemia  Comments:  The patient was advised to increase protein in her diet. The patient was advised to contact Dr. Barnard to adjust her Eliquis dose.      3. Need for vaccination  Comments:  She declines to receive a pneumonia vaccine at this time.        Return for Annual physical. unless patient needs to be seen sooner or acute issues arise.    Code Status: full     I have discussed the patient results/orders and and plan/recommendation with them at today's visit.      Transcribed from ambient dictation for RASHI Agustin by Tung Shaw.  05/30/23   12:23 CDT    Patient or patient representative verbalized consent to the visit recording.      RASHI Agustin   05/30/2023

## 2023-06-05 ENCOUNTER — TELEPHONE (OUTPATIENT)
Dept: CARDIOLOGY | Facility: CLINIC | Age: 68
End: 2023-06-05
Payer: MEDICARE

## 2023-06-05 NOTE — TELEPHONE ENCOUNTER
Caller: Lilli Trivedi    Relationship to patient: Self    Best call back number: [phone]    Chief complaint: PATIENT WAS CONSULTED BY  IN THE HOSPITAL ON 04.13.23. WHEN SHE WAS DISCHARGED SHE WAS SENT TO A REHABILITATION FACILITY. AFTER LEAVING THEIR FACILITY ABOUT 2.5 WEEKS AGO, THE PATIENT HAD TO FOLLOW WITH ORTHOPEDICS AND PHYSICAL THERAPY.    Type of visit: HOSPITAL FOLLOW UP/FOLLOW UP    Additional notes: PATIENT PREFERS APPOINTMENTS TO BE AFTER 1200 AND NOT ON FRIDAYS.

## 2023-06-29 PROBLEM — E87.6 HYPOKALEMIA: Status: RESOLVED | Noted: 2023-04-12 | Resolved: 2023-06-29

## 2023-06-29 PROBLEM — J18.9 PNEUMONIA: Status: RESOLVED | Noted: 2023-05-30 | Resolved: 2023-06-29

## 2023-06-29 PROBLEM — I48.91 ATRIAL FIBRILLATION WITH RAPID VENTRICULAR RESPONSE: Status: RESOLVED | Noted: 2023-04-11 | Resolved: 2023-06-29

## 2023-06-29 PROBLEM — S02.2XXA NASAL FRACTURE: Status: RESOLVED | Noted: 2023-04-12 | Resolved: 2023-06-29

## 2023-08-17 ENCOUNTER — TELEPHONE (OUTPATIENT)
Dept: OTHER | Age: 68
End: 2023-08-17

## 2023-08-17 DIAGNOSIS — Z80.3 FAMILY HISTORY OF MALIGNANT NEOPLASM OF BREAST: ICD-10-CM

## 2023-08-17 DIAGNOSIS — Z84.81 FAMILY HISTORY OF GENETIC DISEASE CARRIER: ICD-10-CM

## 2023-08-17 DIAGNOSIS — Z80.42 FAMILY HISTORY OF PROSTATE CANCER: ICD-10-CM

## 2023-08-17 NOTE — TELEPHONE ENCOUNTER
Received phone call from patient. Her sister was recently diagnosed with Breast Cancer and is positive for CHRISTIANO mutation. We completed a thorough history and patient qualifies for free testing based on family variant program. An order was placed electronically and patient will stop by next week to have blood drawn.

## 2023-08-31 ENCOUNTER — OFFICE VISIT (OUTPATIENT)
Dept: FAMILY MEDICINE CLINIC | Facility: CLINIC | Age: 68
End: 2023-08-31
Payer: MEDICARE

## 2023-08-31 VITALS
SYSTOLIC BLOOD PRESSURE: 118 MMHG | HEART RATE: 108 BPM | BODY MASS INDEX: 15.37 KG/M2 | RESPIRATION RATE: 18 BRPM | OXYGEN SATURATION: 98 % | DIASTOLIC BLOOD PRESSURE: 76 MMHG | WEIGHT: 109.8 LBS | TEMPERATURE: 97.8 F | HEIGHT: 71 IN

## 2023-08-31 DIAGNOSIS — B37.9 ANTIBIOTIC-INDUCED YEAST INFECTION: ICD-10-CM

## 2023-08-31 DIAGNOSIS — J01.40 ACUTE NON-RECURRENT PANSINUSITIS: Primary | ICD-10-CM

## 2023-08-31 DIAGNOSIS — R05.9 COUGH, UNSPECIFIED TYPE: ICD-10-CM

## 2023-08-31 DIAGNOSIS — T36.95XA ANTIBIOTIC-INDUCED YEAST INFECTION: ICD-10-CM

## 2023-08-31 RX ORDER — CHLORAL HYDRATE 500 MG
CAPSULE ORAL
COMMUNITY

## 2023-08-31 RX ORDER — FLUCONAZOLE 150 MG/1
150 TABLET ORAL ONCE
Qty: 1 TABLET | Refills: 0 | Status: SHIPPED | OUTPATIENT
Start: 2023-08-31 | End: 2023-08-31

## 2023-08-31 RX ORDER — AZITHROMYCIN 250 MG/1
TABLET, FILM COATED ORAL
Qty: 6 TABLET | Refills: 0 | Status: SHIPPED | OUTPATIENT
Start: 2023-08-31

## 2023-08-31 RX ORDER — FLUTICASONE PROPIONATE 50 MCG
2 SPRAY, SUSPENSION (ML) NASAL DAILY
Qty: 15.8 ML | Refills: 2 | Status: SHIPPED | OUTPATIENT
Start: 2023-08-31

## 2023-08-31 RX ORDER — GUAIFENESIN 600 MG/1
1200 TABLET, EXTENDED RELEASE ORAL 2 TIMES DAILY
Qty: 120 TABLET | Refills: 2 | Status: SHIPPED | OUTPATIENT
Start: 2023-08-31

## 2023-08-31 NOTE — PROGRESS NOTES
Subjective     Chief Complaint   Patient presents with    Earache    SINUS PRESSURE    Nasal Congestion       History of Present Illness    Patient presents today with left earache, left sinus pressure, productive cough, and nasal congestion for 2 weeks.     Patient's PMR from outside medical facility reviewed and noted.    Review of Systems   Constitutional:  Negative for fever.   HENT:  Positive for congestion, ear pain, sinus pressure and sinus pain.    Respiratory:  Positive for cough. Negative for shortness of breath and wheezing.       Otherwise complete ROS reviewed and negative except as mentioned in the HPI.    Past Medical History:   Past Medical History:   Diagnosis Date    Disease of thyroid gland      Past Surgical History:  Past Surgical History:   Procedure Laterality Date    HAND SURGERY      HYSTERECTOMY      with BSO    TONSILLECTOMY       Social History:  reports that she has never smoked. She has never used smokeless tobacco. She reports current alcohol use. She reports that she does not use drugs.    Family History: family history includes Breast cancer (age of onset: 80) in her maternal grandmother; Lung cancer in her father; No Known Problems in her mother and sister; Prostate cancer (age of onset: 70) in her maternal grandfather; Prostate cancer (age of onset: 80) in her brother.      Allergies:  Allergies   Allergen Reactions    Barley Grass Shortness Of Breath and Rash    Broccoli [Brassica Oleracea] Shortness Of Breath and Rash    Cinnamon Anaphylaxis    Mustard Shortness Of Breath and Rash     Sore throat     Mustard Seed Anaphylaxis    Peanut-Containing Drug Products Anaphylaxis    Penicillins Shortness Of Breath     Medications:  Prior to Admission medications    Medication Sig Start Date End Date Taking? Authorizing Provider   albuterol sulfate  (90 Base) MCG/ACT inhaler Inhale 2 puffs Every 4 (Four) Hours As Needed for Wheezing. 5/8/23  Yes Vanessa Regalado, RASHI    apixaban (ELIQUIS) 5 MG tablet tablet Take 1 tablet by mouth Every 12 (Twelve) Hours. Indications: Atrial Fibrillation  Patient taking differently: Take 1 tablet by mouth 1 (One) Time. Indications: Atrial Fibrillation 4/14/23  Yes Jennifer Nelson APRN   b complex vitamins capsule Take 1 capsule by mouth Daily.   Yes Abbi De Jesus MD   calcium carbonate (OS-SHAISTA) 600 MG tablet Take 1 tablet by mouth Daily.   Yes Abbi De Jesus MD   Cholecalciferol (VITAMIN D3) 2000 units capsule Take 1 capsule by mouth Daily.   Yes Abbi De Jesus MD   Cholecalciferol 50 MCG (2000 UT) capsule Take 1 capsule by mouth.   Yes Abbi De Jesus MD   guaiFENesin (Mucinex) 600 MG 12 hr tablet Take 2 tablets by mouth 2 (Two) Times a Day. 4/3/23  Yes Ave Canada APRN   MAGNESIUM PO Take 200 mg by mouth Daily.   Yes Abbi De Jesus MD   metoprolol tartrate (LOPRESSOR) 25 MG tablet Take 1 tablet by mouth 2 (Two) Times a Day As Needed (palpitations). 6/29/23  Yes Taylor Ruby APRN   Omega-3 Fatty Acids (fish oil) 1000 MG capsule capsule Take  by mouth Daily With Breakfast.   Yes Abbi De Jesus MD   Potassium Bicarb-Citric Acid (Effer-K) 10 MEQ effervescent tablet 10 mEq. 4/11/23  Yes Abbi De Jesus MD   thyroid (ARMOUR) 15 MG tablet Take 1 tablet by mouth Daily.   Yes Abbi De Jesus MD   Zinc 50 MG tablet Take 1 tablet by mouth Daily.   Yes Abbi De Jesus MD         PHQ-9 Depression Screening  Little interest or pleasure in doing things? 0-->not at all   Feeling down, depressed, or hopeless? 0-->not at all   Trouble falling or staying asleep, or sleeping too much?     Feeling tired or having little energy?     Poor appetite or overeating?     Feeling bad about yourself - or that you are a failure or have let yourself or your family down?     Trouble concentrating on things, such as reading the newspaper or watching television?     Moving or speaking so slowly that other  "people could have noticed? Or the opposite - being so fidgety or restless that you have been moving around a lot more than usual?     Thoughts that you would be better off dead, or of hurting yourself in some way?     PHQ-9 Total Score 0   If you checked off any problems, how difficult have these problems made it for you to do your work, take care of things at home, or get along with other people?         PHQ-9 Total Score: 0   0 (Negative screening for depression)  Support given, observe for worsening symptoms    Objective     Vital Signs: /76 (BP Location: Right arm, Patient Position: Sitting, Cuff Size: Adult)   Pulse 108   Temp 97.8 °F (36.6 °C) (Infrared)   Resp 18   Ht 180.3 cm (71\")   Wt 49.8 kg (109 lb 12.8 oz)   LMP 06/28/2001 (Exact Date)   SpO2 98%   BMI 15.31 kg/m²   Physical Exam  Vitals and nursing note reviewed.   Constitutional:       Appearance: Normal appearance. She is overweight.   HENT:      Head: Normocephalic.      Right Ear: Tympanic membrane normal.      Left Ear: Tympanic membrane is bulging.      Nose: Congestion present.      Left Sinus: Maxillary sinus tenderness and frontal sinus tenderness present.      Mouth/Throat:      Mouth: Mucous membranes are moist.      Pharynx: Posterior oropharyngeal erythema present.   Eyes:      Conjunctiva/sclera: Conjunctivae normal.   Cardiovascular:      Rate and Rhythm: Normal rate and regular rhythm.      Pulses: Normal pulses.      Heart sounds: Normal heart sounds.   Pulmonary:      Effort: Pulmonary effort is normal.      Breath sounds: Normal breath sounds.   Musculoskeletal:         General: Normal range of motion.      Cervical back: Normal range of motion.   Skin:     General: Skin is warm and dry.   Neurological:      General: No focal deficit present.      Mental Status: She is alert and oriented to person, place, and time.   Psychiatric:         Mood and Affect: Mood normal.         Behavior: Behavior normal.       Advance Care " Planning   ACP discussion was held with the patient during this visit. Patient has an advance directive in EMR which is still valid.          Results Reviewed:  Glucose   Date Value Ref Range Status   04/28/2023 72 70 - 99 mg/dL Final   04/13/2023 100 (H) 65 - 99 mg/dL Final     BUN   Date Value Ref Range Status   04/28/2023 15 8 - 27 mg/dL Final   04/13/2023 22 8 - 23 mg/dL Final     Creatinine   Date Value Ref Range Status   04/28/2023 0.69 0.57 - 1.00 mg/dL Final   04/13/2023 0.46 (L) 0.57 - 1.00 mg/dL Final     Sodium   Date Value Ref Range Status   04/28/2023 136 134 - 144 mmol/L Final   04/13/2023 137 136 - 145 mmol/L Final     Potassium   Date Value Ref Range Status   04/28/2023 4.5 3.5 - 5.2 mmol/L Final   04/13/2023 4.0 3.5 - 5.2 mmol/L Final     Comment:     Slight hemolysis detected by analyzer. Results may be affected.     Chloride   Date Value Ref Range Status   04/28/2023 100 96 - 106 mmol/L Final   04/13/2023 100 98 - 107 mmol/L Final     CO2   Date Value Ref Range Status   04/13/2023 30.0 (H) 22.0 - 29.0 mmol/L Final     Total CO2   Date Value Ref Range Status   04/28/2023 24 20 - 29 mmol/L Final     Calcium   Date Value Ref Range Status   04/28/2023 10.0 8.7 - 10.3 mg/dL Final   04/13/2023 9.7 8.6 - 10.5 mg/dL Final     ALT (SGPT)   Date Value Ref Range Status   04/28/2023 35 (H) 0 - 32 IU/L Final   04/12/2023 171 (H) 1 - 33 U/L Final     AST (SGOT)   Date Value Ref Range Status   04/28/2023 25 0 - 40 IU/L Final   04/12/2023 65 (H) 1 - 32 U/L Final     WBC   Date Value Ref Range Status   04/28/2023 6.7 3.4 - 10.8 x10E3/uL Final     Hematocrit   Date Value Ref Range Status   04/28/2023 36.6 34.0 - 46.6 % Final   04/14/2023 39.3 34.0 - 46.6 % Final     Platelets   Date Value Ref Range Status   04/28/2023 319 150 - 450 x10E3/uL Final   04/14/2023 246 140 - 450 10*3/mm3 Final     Total Cholesterol   Date Value Ref Range Status   04/11/2023 162 0 - 200 mg/dL Final     Triglycerides   Date Value Ref Range  Status   04/11/2023 93 0 - 150 mg/dL Final     HDL Cholesterol   Date Value Ref Range Status   04/11/2023 66 (H) 40 - 60 mg/dL Final     LDL Cholesterol    Date Value Ref Range Status   04/11/2023 79 0 - 100 mg/dL Final     LDL/HDL Ratio   Date Value Ref Range Status   04/11/2023 1.17  Final     Hemoglobin A1C   Date Value Ref Range Status   12/05/2017 5.10 % Final     Comment:     Less than 6.0           Non-Diabetic Range  6.0-7.0                 ADA Therapeutic Target  Greater than 7.0        Action Suggested           Assessment / Plan     Assessment/Plan     Diagnoses and all orders for this visit:    1. Acute non-recurrent pansinusitis (Primary)  -     azithromycin (Zithromax Z-Pardeep) 250 MG tablet; Take 2 tablets by mouth on day 1, then 1 tablet daily on days 2-5  Dispense: 6 tablet; Refill: 0  -     fluticasone (FLONASE) 50 MCG/ACT nasal spray; 2 sprays into the nostril(s) as directed by provider Daily.  Dispense: 15.8 mL; Refill: 2    2. Cough, unspecified type  -     guaiFENesin (Mucinex) 600 MG 12 hr tablet; Take 2 tablets by mouth 2 (Two) Times a Day.  Dispense: 120 tablet; Refill: 2    3. Antibiotic-induced yeast infection  -     fluconazole (Diflucan) 150 MG tablet; Take 1 tablet by mouth 1 (One) Time for 1 dose.  Dispense: 1 tablet; Refill: 0         An After Visit Summary was printed and given to the patient at discharge.  Return if symptoms worsen or fail to improve.    I have discussed the patient results/orders and plan/recommendation with them at today's visit.      Ave Canada, RASHI   08/31/2023

## 2023-09-05 ENCOUNTER — OFFICE VISIT (OUTPATIENT)
Dept: INTERNAL MEDICINE | Facility: CLINIC | Age: 68
End: 2023-09-05
Payer: MEDICARE

## 2023-09-05 VITALS
WEIGHT: 113 LBS | HEART RATE: 71 BPM | OXYGEN SATURATION: 98 % | DIASTOLIC BLOOD PRESSURE: 71 MMHG | SYSTOLIC BLOOD PRESSURE: 122 MMHG | HEIGHT: 71 IN | RESPIRATION RATE: 16 BRPM | BODY MASS INDEX: 15.82 KG/M2 | TEMPERATURE: 98.2 F

## 2023-09-05 DIAGNOSIS — B02.30 HERPES ZOSTER WITH OPHTHALMIC COMPLICATION, UNSPECIFIED HERPES ZOSTER EYE DISEASE: Primary | ICD-10-CM

## 2023-09-05 PROCEDURE — 1159F MED LIST DOCD IN RCRD: CPT

## 2023-09-05 PROCEDURE — 1160F RVW MEDS BY RX/DR IN RCRD: CPT

## 2023-09-05 PROCEDURE — 99213 OFFICE O/P EST LOW 20 MIN: CPT

## 2023-09-05 RX ORDER — DOXEPIN HYDROCHLORIDE 100 MG/1
100 CAPSULE ORAL 2 TIMES DAILY
COMMUNITY

## 2023-09-05 RX ORDER — VALACYCLOVIR HYDROCHLORIDE 1 G/1
1000 TABLET, FILM COATED ORAL 2 TIMES DAILY
COMMUNITY

## 2023-09-05 NOTE — PROGRESS NOTES
"Chief Complaint  Facial Swelling (EYES) and Rash    Subjective        Lilli Trivedi presents to [facility] PRIMARY [facility]  History of Present Illness  Patient is a 68-year-old female presenting today with complaints of a rash and swelling on her face. She first noticed the rash on Friday night above her left eye. She had presented to [facility] on Sunday and was diagnosed with either impetigo or shingles. With that she was prescribed doxycycline, mupirocin ointment, and valacyclovir. The rash is reported to be improving with decreased swelling around her left eye. Has appointment with Dr. Miramontes (ophthalmologist) Thursday.     Past Medical History:   Diagnosis Date    Disease of thyroid gland      Past Surgical History:   Procedure Laterality Date    HAND SURGERY      HYSTERECTOMY      with BSO    TONSILLECTOMY       Social History     Socioeconomic History    Marital status:    Tobacco Use    Smoking status: Never    Smokeless tobacco: Never   Vaping Use    Vaping Use: Never used   Substance and Sexual Activity    Alcohol use: Yes    Drug use: No    Sexual activity: Yes     Birth control/protection: Post-menopausal     Family History   Problem Relation Age of Onset    Lung cancer Father     No Known Problems Mother     Prostate cancer Brother 80    No Known Problems Sister     Breast cancer Maternal Grandmother 80    Prostate cancer Maternal Grandfather 70    Ovarian cancer Neg Hx     Uterine cancer Neg Hx     Colon cancer Neg Hx     Melanoma Neg Hx        Objective   Vital Signs:  /71 (BP Location: Left arm, Patient Position: Sitting, Cuff Size: Adult)   Pulse 71   Temp 98.2 °F (36.8 °C) (Infrared)   Resp 16   Ht 180.3 cm (70.98\")   Wt 51.3 kg (113 lb)   SpO2 98%   BMI 15.77 kg/m²   Estimated body mass index is 15.77 kg/m² as calculated from the following:    Height as of this encounter: 180.3 cm (70.98\").    Weight as of this encounter: 51.3 kg (113 lb).        "       Physical Exam  Vitals and nursing note reviewed.   Constitutional:       Appearance: Normal appearance.   HENT:      Head: Normocephalic.      Right Ear: Tympanic membrane normal.      Left Ear: Tympanic membrane normal.      Nose: Nose normal.      Mouth/Throat:      Mouth: Mucous membranes are moist.      Pharynx: Oropharynx is clear.   Eyes:      Extraocular Movements: Extraocular movements intact.      Conjunctiva/sclera:      Left eye: Left conjunctiva is injected.      Pupils: Pupils are equal, round, and reactive to light.   Cardiovascular:      Rate and Rhythm: Normal rate and regular rhythm.      Pulses: Normal pulses.      Heart sounds: Normal heart sounds.   Pulmonary:      Effort: Pulmonary effort is normal. No respiratory distress.      Breath sounds: Normal breath sounds.   Abdominal:      General: Bowel sounds are normal.      Palpations: Abdomen is soft.   Musculoskeletal:         General: Normal range of motion.      Cervical back: Normal range of motion.   Skin:     General: Skin is warm and dry.      Capillary Refill: Capillary refill takes less than 2 seconds.      Findings: Rash present. Rash is crusting and pustular.             Comments: Rash is above left eye extending into hairline. Distinct midline border.   Neurological:      General: No focal deficit present.      Mental Status: She is alert.      Result Review :               Assessment and Plan   Diagnoses and all orders for this visit:    1. Herpes zoster with ophthalmic complication, unspecified herpes zoster eye disease (Primary)    - Patient to continue use of Valacyclovir as previously prescribed.   - Discussed continued use of mupirocin ok to prevent bacterial co-infection with severity of rash.     - Strongly encouraged to keep appointment with opthalmology          Follow Up   Return if symptoms worsen or fail to improve.  Patient was given instructions and counseling regarding her condition or for health maintenance  advice. Please see specific information pulled into the AVS if appropriate.

## 2023-09-07 LAB
Lab: NEGATIVE
Lab: NORMAL

## 2023-09-12 ENCOUNTER — OFFICE VISIT (OUTPATIENT)
Dept: INTERNAL MEDICINE | Facility: CLINIC | Age: 68
End: 2023-09-12
Payer: MEDICARE

## 2023-09-12 VITALS
HEIGHT: 71 IN | BODY MASS INDEX: 15.4 KG/M2 | TEMPERATURE: 98.4 F | RESPIRATION RATE: 17 BRPM | SYSTOLIC BLOOD PRESSURE: 129 MMHG | HEART RATE: 103 BPM | DIASTOLIC BLOOD PRESSURE: 82 MMHG | WEIGHT: 110 LBS | OXYGEN SATURATION: 99 %

## 2023-09-12 DIAGNOSIS — B02.30 HERPES ZOSTER WITH OPHTHALMIC COMPLICATION, UNSPECIFIED HERPES ZOSTER EYE DISEASE: Primary | ICD-10-CM

## 2023-09-12 PROCEDURE — 1159F MED LIST DOCD IN RCRD: CPT | Performed by: NURSE PRACTITIONER

## 2023-09-12 PROCEDURE — 99213 OFFICE O/P EST LOW 20 MIN: CPT | Performed by: NURSE PRACTITIONER

## 2023-09-12 PROCEDURE — 1160F RVW MEDS BY RX/DR IN RCRD: CPT | Performed by: NURSE PRACTITIONER

## 2023-09-12 RX ORDER — PREDNISONE 10 MG/1
10 TABLET ORAL DAILY
Qty: 10 TABLET | Refills: 0 | Status: SHIPPED | OUTPATIENT
Start: 2023-09-12

## 2023-09-12 RX ORDER — PREDNISOLONE ACETATE 10 MG/ML
1 SUSPENSION/ DROPS OPHTHALMIC 4 TIMES DAILY
COMMUNITY
Start: 2023-09-07

## 2023-09-12 NOTE — PROGRESS NOTES
Subjective     Chief Complaint   Patient presents with    Facial Droop       Facial Droop    Symptoms started Friday night. Was diagnosed with shingles since 9/3 in [facility]. treated with valacyclovir and prednisone eye drops, facial drooping started Wednesday. It is some better. Eye was almost shut. No pain. Was having edema but this is better. Has been using eye drops.     Review of Systems   Otherwise complete ROS reviewed and negative except as mentioned in the HPI.    Past Medical History:   Past Medical History:   Diagnosis Date    Disease of thyroid gland      Past Surgical History:  Past Surgical History:   Procedure Laterality Date    HAND SURGERY      HYSTERECTOMY      with BSO    TONSILLECTOMY       Social History:  reports that she has never smoked. She has never used smokeless tobacco. She reports current alcohol use. She reports that she does not use drugs.    Family History: family history includes Breast cancer (age of onset: 80) in her maternal grandmother; Lung cancer in her father; No Known Problems in her mother and sister; Prostate cancer (age of onset: 70) in her maternal grandfather; Prostate cancer (age of onset: 80) in her brother.       Allergies:  Allergies   Allergen Reactions    Barley Grass Shortness Of Breath and Rash    Broccoli [Brassica Oleracea] Shortness Of Breath and Rash    Cinnamon Anaphylaxis    Mustard Shortness Of Breath and Rash     Sore throat     Mustard Seed Anaphylaxis    Peanut-Containing Drug Products Anaphylaxis    Penicillins Shortness Of Breath     Medications:  Prior to Admission medications    Medication Sig Start Date End Date Taking? Authorizing Provider   prednisoLONE acetate (PRED FORTE) 1 % ophthalmic suspension Administer 1 drop into the left eye 4 (Four) Times a Day. 9/7/23  Yes Provider, MD Abbi   albuterol sulfate  (90 Base) MCG/ACT inhaler Inhale 2 puffs Every 4 (Four) Hours As Needed for Wheezing. 5/8/23   Fabricio  RASHI Rice   apixaban (ELIQUIS) 5 MG tablet tablet Take 1 tablet by mouth Every 12 (Twelve) Hours. Indications: Atrial Fibrillation  Patient taking differently: Take 1 tablet by mouth 1 (One) Time. Indications: Atrial Fibrillation 4/14/23   Jennifer Nelson APRN   azithromycin (Zithromax Z-Pardeep) 250 MG tablet Take 2 tablets by mouth on day 1, then 1 tablet daily on days 2-5 8/31/23   Ave Canada APRN   b complex vitamins capsule Take 1 capsule by mouth Daily.    Abbi De Jesus MD   calcium carbonate (OS-SHAISTA) 600 MG tablet Take 1 tablet by mouth Daily.    Abbi De Jesus MD   Cholecalciferol (VITAMIN D3) 2000 units capsule Take 1 capsule by mouth Daily.    Abbi De Jesus MD   Cholecalciferol 50 MCG (2000 UT) capsule Take 1 capsule by mouth.    Abbi De Jesus MD   doxepin (SINEquan) 100 MG capsule Take 1 capsule by mouth 2 (Two) Times a Day.    Abbi De Jesus MD   fluticasone (FLONASE) 50 MCG/ACT nasal spray 2 sprays into the nostril(s) as directed by provider Daily. 8/31/23   Ave Canada APRN   guaiFENesin (Mucinex) 600 MG 12 hr tablet Take 2 tablets by mouth 2 (Two) Times a Day. 8/31/23   Ave Canada APRN   MAGNESIUM PO Take 200 mg by mouth Daily.    Abbi De Jesus MD   metoprolol tartrate (LOPRESSOR) 25 MG tablet Take 1 tablet by mouth 2 (Two) Times a Day As Needed (palpitations). 6/29/23   Taylor Ruby APRN   mupirocin (BACTROBAN) 2 % ointment Apply 1 application  topically to the appropriate area as directed 3 (Three) Times a Day.    Abbi De Jesus MD   Omega-3 Fatty Acids (fish oil) 1000 MG capsule capsule Take  by mouth Daily With Breakfast.    Abbi De Jesus MD   Potassium Bicarb-Citric Acid (Effer-K) 10 MEQ effervescent tablet 10 mEq. 4/11/23   Abbi De Jesus MD   thyroid (ARMOUR) 15 MG tablet Take 1 tablet by mouth Daily.    Abbi De Jesus MD   valACYclovir (Valtrex) 1000 MG tablet Take 1  "tablet by mouth 2 (Two) Times a Day.    Provider, MD Abbi   Zinc 50 MG tablet Take 1 tablet by mouth Daily.    Provider, MD Abbi       Objective     Vital Signs: /82   Pulse 103   Temp 98.4 °F (36.9 °C)   Resp 17   Ht 180.3 cm (70.98\")   Wt 49.9 kg (110 lb)   LMP 06/28/2001 (Exact Date)   SpO2 99%   BMI 15.35 kg/m²   Physical Exam  Vitals reviewed.   Constitutional:       Appearance: Normal appearance. She is well-developed.   HENT:      Head: Normocephalic and atraumatic.   Eyes:      Pupils: Pupils are equal, round, and reactive to light.   Neck:      Vascular: No JVD.   Cardiovascular:      Rate and Rhythm: Normal rate and regular rhythm.   Pulmonary:      Effort: Pulmonary effort is normal.   Abdominal:      General: Bowel sounds are normal.      Palpations: Abdomen is soft.   Musculoskeletal:         General: No deformity.      Cervical back: Normal range of motion and neck supple.   Lymphadenopathy:      Cervical: No cervical adenopathy.   Skin:     General: Skin is warm and dry.      Findings: Rash (In her portion left upper forehead starting at the eyebrow drying shingles noted) present.   Neurological:      Mental Status: She is alert and oriented to person, place, and time.      Comments: Left-sided facial droop and even smile   Psychiatric:         Behavior: Behavior normal.         Thought Content: Thought content normal.         Judgment: Judgment normal.     Results Reviewed:  Glucose   Date Value Ref Range Status   04/28/2023 72 70 - 99 mg/dL Final   04/13/2023 100 (H) 65 - 99 mg/dL Final     BUN   Date Value Ref Range Status   04/28/2023 15 8 - 27 mg/dL Final   04/13/2023 22 8 - 23 mg/dL Final     Creatinine   Date Value Ref Range Status   04/28/2023 0.69 0.57 - 1.00 mg/dL Final   04/13/2023 0.46 (L) 0.57 - 1.00 mg/dL Final     Sodium   Date Value Ref Range Status   04/28/2023 136 134 - 144 mmol/L Final   04/13/2023 137 136 - 145 mmol/L Final     Potassium   Date Value Ref " Range Status   04/28/2023 4.5 3.5 - 5.2 mmol/L Final   04/13/2023 4.0 3.5 - 5.2 mmol/L Final     Comment:     Slight hemolysis detected by analyzer. Results may be affected.     Chloride   Date Value Ref Range Status   04/28/2023 100 96 - 106 mmol/L Final   04/13/2023 100 98 - 107 mmol/L Final     CO2   Date Value Ref Range Status   04/13/2023 30.0 (H) 22.0 - 29.0 mmol/L Final     Total CO2   Date Value Ref Range Status   04/28/2023 24 20 - 29 mmol/L Final     Calcium   Date Value Ref Range Status   04/28/2023 10.0 8.7 - 10.3 mg/dL Final   04/13/2023 9.7 8.6 - 10.5 mg/dL Final     ALT (SGPT)   Date Value Ref Range Status   04/28/2023 35 (H) 0 - 32 IU/L Final   04/12/2023 171 (H) 1 - 33 U/L Final     AST (SGOT)   Date Value Ref Range Status   04/28/2023 25 0 - 40 IU/L Final   04/12/2023 65 (H) 1 - 32 U/L Final     WBC   Date Value Ref Range Status   04/28/2023 6.7 3.4 - 10.8 x10E3/uL Final     Hematocrit   Date Value Ref Range Status   04/28/2023 36.6 34.0 - 46.6 % Final   04/14/2023 39.3 34.0 - 46.6 % Final     Platelets   Date Value Ref Range Status   04/28/2023 319 150 - 450 x10E3/uL Final   04/14/2023 246 140 - 450 10*3/mm3 Final     Total Cholesterol   Date Value Ref Range Status   04/11/2023 162 0 - 200 mg/dL Final     Triglycerides   Date Value Ref Range Status   04/11/2023 93 0 - 150 mg/dL Final     HDL Cholesterol   Date Value Ref Range Status   04/11/2023 66 (H) 40 - 60 mg/dL Final     LDL Cholesterol    Date Value Ref Range Status   04/11/2023 79 0 - 100 mg/dL Final     LDL/HDL Ratio   Date Value Ref Range Status   04/11/2023 1.17  Final     Hemoglobin A1C   Date Value Ref Range Status   12/05/2017 5.10 % Final     Comment:     Less than 6.0           Non-Diabetic Range  6.0-7.0                 ADA Therapeutic Target  Greater than 7.0        Action Suggested           Assessment / Plan     Assessment/Plan:  Diagnoses and all orders for this visit:    1. Herpes zoster with ophthalmic complication,  unspecified herpes zoster eye disease (Primary)  -     predniSONE (DELTASONE) 10 MG tablet; Take 1 tablet by mouth Daily.  Dispense: 10 tablet; Refill: 0      No follow-ups on file. unless patient needs to be seen sooner or acute issues arise.    Code Status: full    I have discussed the patient results/orders and and plan/recommendation with them at today's visit.      Vanessa Regalado, RASHI   09/12/2023

## 2023-10-17 ENCOUNTER — OFFICE VISIT (OUTPATIENT)
Dept: INTERNAL MEDICINE | Facility: CLINIC | Age: 68
End: 2023-10-17
Payer: MEDICARE

## 2023-10-17 VITALS
HEIGHT: 71 IN | HEART RATE: 77 BPM | RESPIRATION RATE: 18 BRPM | SYSTOLIC BLOOD PRESSURE: 127 MMHG | DIASTOLIC BLOOD PRESSURE: 80 MMHG | OXYGEN SATURATION: 98 % | TEMPERATURE: 98.2 F | WEIGHT: 113 LBS | BODY MASS INDEX: 15.82 KG/M2

## 2023-10-17 DIAGNOSIS — Z00.00 MEDICARE ANNUAL WELLNESS VISIT, SUBSEQUENT: Primary | ICD-10-CM

## 2023-10-17 PROCEDURE — 1170F FXNL STATUS ASSESSED: CPT | Performed by: NURSE PRACTITIONER

## 2023-10-17 PROCEDURE — G0439 PPPS, SUBSEQ VISIT: HCPCS | Performed by: NURSE PRACTITIONER

## 2023-10-17 NOTE — PROGRESS NOTES
The ABCs of the Annual Wellness Visit  Subsequent Medicare Wellness Visit    Subjective      Lilli Trivedi is a 68 y.o. female who presents for a Subsequent Medicare Wellness Visit. She recently had genetic testing for cancer given family history and it was all negative.     The following portions of the patient's history were reviewed and   updated as appropriate: allergies, current medications, past family history, past medical history, past social history, past surgical history, and problem list.    Compared to one year ago, the patient feels her physical   health is worse.    Compared to one year ago, the patient feels her mental   health is the same.     Recent Hospitalizations:  This patient has had a [facility] admission record on file within the last 365 days.    Current Medical Providers:  Patient Care Team:  Vanessa Regalado APRN as PCP - General (Nurse Practitioner)    Outpatient Medications Prior to Visit   Medication Sig Dispense Refill    albuterol sulfate  (90 Base) MCG/ACT inhaler Inhale 2 puffs Every 4 (Four) Hours As Needed for Wheezing. 8 g 1    apixaban (ELIQUIS) 5 MG tablet tablet Take 1 tablet by mouth Every 12 (Twelve) Hours. Indications: Atrial Fibrillation (Patient taking differently: Take 1 tablet by mouth 1 (One) Time. Indications: Atrial Fibrillation) 60 tablet     b complex vitamins capsule Take 1 capsule by mouth Daily.      calcium carbonate (OS-SHAISTA) 600 MG tablet Take 1 tablet by mouth Daily.      Cholecalciferol (VITAMIN D3) 2000 units capsule Take 1 capsule by mouth Daily.      Cholecalciferol 50 MCG (2000 UT) capsule Take 1 capsule by mouth.      doxepin (SINEquan) 100 MG capsule Take 1 capsule by mouth 2 (Two) Times a Day.      fluticasone (FLONASE) 50 MCG/ACT nasal spray 2 sprays into the nostril(s) as directed by provider Daily. 15.8 mL 2    guaiFENesin (Mucinex) 600 MG 12 hr tablet Take 2 tablets by mouth 2 (Two) Times a Day. 120 tablet 2    MAGNESIUM PO  Take 200 mg by mouth Daily.      metoprolol tartrate (LOPRESSOR) 25 MG tablet Take 1 tablet by mouth 2 (Two) Times a Day As Needed (palpitations).      mupirocin (BACTROBAN) 2 % ointment Apply 1 application  topically to the appropriate area as directed 3 (Three) Times a Day.      Omega-3 Fatty Acids (fish oil) 1000 MG capsule capsule Take  by mouth Daily With Breakfast.      Potassium Bicarb-Citric Acid (Effer-K) 10 MEQ effervescent tablet 10 mEq.      prednisoLONE acetate (PRED FORTE) 1 % ophthalmic suspension Administer 1 drop into the left eye 4 (Four) Times a Day.      thyroid (ARMOUR) 15 MG tablet Take 1 tablet by mouth Daily.      Zinc 50 MG tablet Take 1 tablet by mouth Daily.      azithromycin (Zithromax Z-Pardeep) 250 MG tablet Take 2 tablets by mouth on day 1, then 1 tablet daily on days 2-5 6 tablet 0    predniSONE (DELTASONE) 10 MG tablet Take 1 tablet by mouth Daily. 10 tablet 0    valACYclovir (Valtrex) 1000 MG tablet Take 1 tablet by mouth 2 (Two) Times a Day. (Patient not taking: Reported on 10/17/2023)       No facility-administered medications prior to visit.       No opioid medication identified on active medication list. I have reviewed chart for other potential  high risk medication/s and harmful drug interactions in the elderly.        Aspirin is not on active medication list.  Aspirin use is contraindicated for this patient due to: current use of Eliquis.  .    Patient Active Problem List   Diagnosis    Seasonal allergic rhinitis    Non-traumatic rhabdomyolysis    Transaminitis    Acute traumatic displaced 4th and 5th metacarpal fracture of the left hand    Closed fracture of shaft of fourth metacarpal bone of left hand, initial encounter     Advance Care Planning   Advance Care Planning     Advance Directive is on file.  ACP discussion was held with the patient during this visit. Patient has an advance directive in EMR which is still valid.      Objective    Vitals:    10/17/23 1012   BP: 127/80  "  Pulse: 77   Resp: 18   Temp: 98.2 °F (36.8 °C)   SpO2: 98%   Weight: 51.3 kg (113 lb)   Height: 180.3 cm (70.98\")     Estimated body mass index is 15.77 kg/m² as calculated from the following:    Height as of this encounter: 180.3 cm (70.98\").    Weight as of this encounter: 51.3 kg (113 lb).    Does the patient have evidence of cognitive impairment?   No            HEALTH RISK ASSESSMENT    Smoking Status:  Social History     Tobacco Use   Smoking Status Never   Smokeless Tobacco Never     Alcohol Consumption:  Social History     Substance and Sexual Activity   Alcohol Use Yes     Fall Risk Screen:    NATASHA Fall Risk Assessment was completed, and patient is at MODERATE risk for falls. Assessment completed on:10/17/2023    Depression Screening:      10/17/2023     9:52 AM   PHQ-2/PHQ-9 Depression Screening   Little Interest or Pleasure in Doing Things 0-->not at all   Feeling Down, Depressed or Hopeless 0-->not at all   PHQ-9: Brief Depression Severity Measure Score 0       Health Habits and Functional and Cognitive Screening:      10/17/2023     9:55 AM   Functional & Cognitive Status   Do you have difficulty preparing food and eating? No   Do you have difficulty bathing yourself, getting dressed or grooming yourself? No   Do you have difficulty using the toilet? No   Do you have difficulty moving around from place to place? No   Do you have trouble with steps or getting out of a bed or a chair? No   Current Diet Well Balanced Diet   Dental Exam Up to date   Eye Exam Up to date   Exercise (times per week) Other   Current Exercises Include Walking;Yard Work   Do you need help using the phone?  No   Are you deaf or do you have serious difficulty hearing?  No   Do you need help to go to places out of walking distance? No   Do you need help shopping? No   Do you need help preparing meals?  No   Do you need help with housework?  No   Do you need help with laundry? No   Do you need help taking your medications? No "   Do you need help managing money? No   Do you ever drive or ride in a car without wearing a seat belt? No   Have you felt unusual stress, anger or loneliness in the last month? No   Who do you live with? Child   If you need help, do you have trouble finding someone available to you? No   Have you been bothered in the last four weeks by sexual problems? No   Do you have difficulty concentrating, remembering or making decisions? No       Age-appropriate Screening Schedule:  Refer to the list below for future screening recommendations based on patient's age, sex and/or medical conditions. Orders for these recommended tests are listed in the plan section. The patient has been provided with a written plan.    Health Maintenance   Topic Date Due    HEPATITIS C SCREENING  Never done    INFLUENZA VACCINE  03/31/2024 (Originally 8/1/2023)    ZOSTER VACCINE (1 of 2) 10/14/2024 (Originally 7/9/2005)    Pneumococcal Vaccine 65+ (1 - PCV) 10/17/2024 (Originally 7/9/2020)    COVID-19 Vaccine (1) 10/29/2024 (Originally 1/9/1956)    MAMMOGRAM  01/25/2024    DXA SCAN  01/25/2024    BMI FOLLOWUP  05/30/2024    ANNUAL WELLNESS VISIT  10/17/2024    COLORECTAL CANCER SCREENING  09/26/2029    TDAP/TD VACCINES (2 - Td or Tdap) 08/23/2032    PAP SMEAR  Discontinued                  CMS Preventative Services Quick Reference  Risk Factors Identified During Encounter:    None Identified    The above risks/problems have been discussed with the patient.  Pertinent information has been shared with the patient in the After Visit Summary.    Diagnoses and all orders for this visit:    1. Medicare annual wellness visit, subsequent (Primary)  -     CBC & Differential  -     Comprehensive Metabolic Panel      Follow Up:   Next Medicare Wellness visit to be scheduled in 1 year.      An After Visit Summary and PPPS were made available to the patient.

## 2023-10-18 LAB
ALBUMIN SERPL-MCNC: 4.6 G/DL (ref 3.9–4.9)
ALBUMIN/GLOB SERPL: 2.3 {RATIO} (ref 1.2–2.2)
ALP SERPL-CCNC: 125 IU/L (ref 44–121)
ALT SERPL-CCNC: 26 IU/L (ref 0–32)
AST SERPL-CCNC: 28 IU/L (ref 0–40)
BASOPHILS # BLD AUTO: 0.1 X10E3/UL (ref 0–0.2)
BASOPHILS NFR BLD AUTO: 1 %
BILIRUB SERPL-MCNC: 0.4 MG/DL (ref 0–1.2)
BUN SERPL-MCNC: 18 MG/DL (ref 8–27)
BUN/CREAT SERPL: 24 (ref 12–28)
CALCIUM SERPL-MCNC: 10 MG/DL (ref 8.7–10.3)
CHLORIDE SERPL-SCNC: 102 MMOL/L (ref 96–106)
CO2 SERPL-SCNC: 25 MMOL/L (ref 20–29)
CREAT SERPL-MCNC: 0.74 MG/DL (ref 0.57–1)
EGFRCR SERPLBLD CKD-EPI 2021: 88 ML/MIN/1.73
EOSINOPHIL # BLD AUTO: 0.1 X10E3/UL (ref 0–0.4)
EOSINOPHIL NFR BLD AUTO: 1 %
ERYTHROCYTE [DISTWIDTH] IN BLOOD BY AUTOMATED COUNT: 13.2 % (ref 11.7–15.4)
GLOBULIN SER CALC-MCNC: 2 G/DL (ref 1.5–4.5)
GLUCOSE SERPL-MCNC: 81 MG/DL (ref 70–99)
HCT VFR BLD AUTO: 42.7 % (ref 34–46.6)
HGB BLD-MCNC: 14.6 G/DL (ref 11.1–15.9)
IMM GRANULOCYTES # BLD AUTO: 0.1 X10E3/UL (ref 0–0.1)
IMM GRANULOCYTES NFR BLD AUTO: 1 %
LYMPHOCYTES # BLD AUTO: 1.7 X10E3/UL (ref 0.7–3.1)
LYMPHOCYTES NFR BLD AUTO: 22 %
MCH RBC QN AUTO: 31.2 PG (ref 26.6–33)
MCHC RBC AUTO-ENTMCNC: 34.2 G/DL (ref 31.5–35.7)
MCV RBC AUTO: 91 FL (ref 79–97)
MONOCYTES # BLD AUTO: 0.7 X10E3/UL (ref 0.1–0.9)
MONOCYTES NFR BLD AUTO: 9 %
NEUTROPHILS # BLD AUTO: 5.3 X10E3/UL (ref 1.4–7)
NEUTROPHILS NFR BLD AUTO: 66 %
PLATELET # BLD AUTO: 271 X10E3/UL (ref 150–450)
POTASSIUM SERPL-SCNC: 4.1 MMOL/L (ref 3.5–5.2)
PROT SERPL-MCNC: 6.6 G/DL (ref 6–8.5)
RBC # BLD AUTO: 4.68 X10E6/UL (ref 3.77–5.28)
SODIUM SERPL-SCNC: 140 MMOL/L (ref 134–144)
WBC # BLD AUTO: 7.9 X10E3/UL (ref 3.4–10.8)

## 2023-10-26 ENCOUNTER — OFFICE VISIT (OUTPATIENT)
Dept: FAMILY MEDICINE CLINIC | Facility: CLINIC | Age: 68
End: 2023-10-26
Payer: MEDICARE

## 2023-10-26 VITALS
HEART RATE: 83 BPM | TEMPERATURE: 97.7 F | WEIGHT: 113 LBS | DIASTOLIC BLOOD PRESSURE: 74 MMHG | SYSTOLIC BLOOD PRESSURE: 130 MMHG | HEIGHT: 71 IN | OXYGEN SATURATION: 98 % | BODY MASS INDEX: 15.82 KG/M2 | RESPIRATION RATE: 18 BRPM

## 2023-10-26 DIAGNOSIS — T36.95XA ANTIBIOTIC-INDUCED YEAST INFECTION: ICD-10-CM

## 2023-10-26 DIAGNOSIS — J01.40 ACUTE NON-RECURRENT PANSINUSITIS: Primary | ICD-10-CM

## 2023-10-26 DIAGNOSIS — R63.6 UNDERWEIGHT: ICD-10-CM

## 2023-10-26 DIAGNOSIS — B37.9 ANTIBIOTIC-INDUCED YEAST INFECTION: ICD-10-CM

## 2023-10-26 PROCEDURE — 1160F RVW MEDS BY RX/DR IN RCRD: CPT | Performed by: NURSE PRACTITIONER

## 2023-10-26 PROCEDURE — 1159F MED LIST DOCD IN RCRD: CPT | Performed by: NURSE PRACTITIONER

## 2023-10-26 PROCEDURE — 99213 OFFICE O/P EST LOW 20 MIN: CPT | Performed by: NURSE PRACTITIONER

## 2023-10-26 RX ORDER — AZITHROMYCIN 250 MG/1
TABLET, FILM COATED ORAL
Qty: 6 TABLET | Refills: 0 | Status: SHIPPED | OUTPATIENT
Start: 2023-10-26

## 2023-10-26 RX ORDER — FLUCONAZOLE 150 MG/1
150 TABLET ORAL ONCE
Qty: 1 TABLET | Refills: 0 | Status: SHIPPED | OUTPATIENT
Start: 2023-10-26 | End: 2023-10-26

## 2023-10-26 NOTE — PROGRESS NOTES
Subjective     Chief Complaint   Patient presents with    Sinus Problem       Sinus Problem  Associated symptoms include ear pain (left), sinus pressure and a sore throat. Pertinent negatives include no shortness of breath.     Post nasal drainage, scratchy throat, laryngitis, sinus pressure and left ear pressure. She believes it is related to her allergies.  She is taking mucinex.  She is concerned she is developing a sinus infection and does not want it to settle into her chest.     Patient's PMR from outside medical facility reviewed and noted.    Review of Systems   HENT:  Positive for ear pain (left), sinus pressure and sore throat.    Respiratory:  Negative for shortness of breath and wheezing.         Otherwise complete ROS reviewed and negative except as mentioned in the HPI.    Past Medical History:   Past Medical History:   Diagnosis Date    Disease of thyroid gland      Past Surgical History:  Past Surgical History:   Procedure Laterality Date    HAND SURGERY      HYSTERECTOMY      with BSO    TONSILLECTOMY       Social History:  reports that she has never smoked. She has never used smokeless tobacco. She reports current alcohol use. She reports that she does not use drugs.    Family History: family history includes Breast cancer (age of onset: 80) in her maternal grandmother; Lung cancer in her father; No Known Problems in her mother and sister; Prostate cancer (age of onset: 70) in her maternal grandfather; Prostate cancer (age of onset: 80) in her brother.      Allergies:  Allergies   Allergen Reactions    Barley Grass Shortness Of Breath and Rash    Broccoli [Brassica Oleracea] Shortness Of Breath and Rash    Cinnamon Anaphylaxis    Mustard Shortness Of Breath and Rash     Sore throat     Mustard Seed Anaphylaxis    Peanut-Containing Drug Products Anaphylaxis    Penicillins Shortness Of Breath    Prednisone Anaphylaxis     Medications:  Prior to Admission medications    Medication Sig Start  Date End Date Taking? Authorizing Provider   albuterol sulfate  (90 Base) MCG/ACT inhaler Inhale 2 puffs Every 4 (Four) Hours As Needed for Wheezing. 5/8/23  Yes Vanessa Regalado APRN   apixaban (ELIQUIS) 5 MG tablet tablet Take 1 tablet by mouth Every 12 (Twelve) Hours. Indications: Atrial Fibrillation  Patient taking differently: Take 1 tablet by mouth 1 (One) Time. Indications: Atrial Fibrillation 4/14/23  Yes Jennifer Nelson APRN   b complex vitamins capsule Take 1 capsule by mouth Daily.   Yes Abbi De Jesus MD   calcium carbonate (OS-SHAISTA) 600 MG tablet Take 1 tablet by mouth Daily.   Yes Abbi De Jesus MD   Cholecalciferol (VITAMIN D3) 2000 units capsule Take 1 capsule by mouth Daily.   Yes Abbi De Jesus MD   Cholecalciferol 50 MCG (2000 UT) capsule Take 1 capsule by mouth.   Yes Abbi De Jesus MD   doxepin (SINEquan) 100 MG capsule Take 1 capsule by mouth 2 (Two) Times a Day.   Yes Abbi De Jesus MD   fluticasone (FLONASE) 50 MCG/ACT nasal spray 2 sprays into the nostril(s) as directed by provider Daily. 8/31/23  Yes Ave Canada APRN   guaiFENesin (Mucinex) 600 MG 12 hr tablet Take 2 tablets by mouth 2 (Two) Times a Day. 8/31/23  Yes Ave Canada APRN   Magnesium Oxide -Mg Supplement 200 MG tablet Take 1 tablet twice a day by oral route.   Yes ProviderAbbi MD   MAGNESIUM PO Take 200 mg by mouth Daily.   Yes ProviderAbbi MD   metoprolol tartrate (LOPRESSOR) 25 MG tablet Take 1 tablet by mouth 2 (Two) Times a Day As Needed (palpitations). 6/29/23  Yes Taylor Ruby APRN   mupirocin (BACTROBAN) 2 % ointment Apply 1 application  topically to the appropriate area as directed 3 (Three) Times a Day.   Yes ProviderAbbi MD   Omega-3 Fatty Acids (fish oil) 1000 MG capsule capsule Take  by mouth Daily With Breakfast.   Yes ProviderAbbi MD   Potassium Bicarb-Citric Acid (Effer-K) 10 MEQ effervescent tablet 10  mEq. 4/11/23  Yes Abbi De Jesus MD   prednisoLONE acetate (PRED FORTE) 1 % ophthalmic suspension Administer 1 drop into the left eye 4 (Four) Times a Day. 9/7/23  Yes Abbi De Jesus MD   predniSONE (DELTASONE) 10 MG tablet Take 1 tablet by mouth Daily. 9/12/23  Yes Vanessa Regalado APRN   thyroid (ARMOUR) 15 MG tablet Take 1 tablet by mouth Daily.   Yes Abbi De Jesus MD   valACYclovir (Valtrex) 1000 MG tablet Take 1 tablet by mouth 2 (Two) Times a Day.   Yes ProviderAbbi MD   Zinc 50 MG tablet Take 1 tablet by mouth Daily.   Yes Abbi De Jesus MD   azithromycin (Zithromax Z-Pardeep) 250 MG tablet Take 2 tablets by mouth on day 1, then 1 tablet daily on days 2-5  Patient not taking: Reported on 10/26/2023 8/31/23   Ave Canada APRN       ANABELL:        PHQ-9 Depression Screening  Little interest or pleasure in doing things? 0-->not at all   Feeling down, depressed, or hopeless? 0-->not at all   Trouble falling or staying asleep, or sleeping too much?     Feeling tired or having little energy?     Poor appetite or overeating?     Feeling bad about yourself - or that you are a failure or have let yourself or your family down?     Trouble concentrating on things, such as reading the newspaper or watching television?     Moving or speaking so slowly that other people could have noticed? Or the opposite - being so fidgety or restless that you have been moving around a lot more than usual?     Thoughts that you would be better off dead, or of hurting yourself in some way?     PHQ-9 Total Score 0   If you checked off any problems, how difficult have these problems made it for you to do your work, take care of things at home, or get along with other people?         PHQ-9 Total Score: 0   0 (Negative screening for depression)  Support given, observe for worsening symptoms    Objective     Vital Signs: /74 (BP Location: Left arm, Patient Position: Sitting, Cuff Size:  "Adult)   Pulse 83   Temp 97.7 °F (36.5 °C) (Infrared)   Resp 18   Ht 180.3 cm (71\")   Wt 51.3 kg (113 lb)   LMP 06/28/2001 (Exact Date)   SpO2 98%   BMI 15.76 kg/m²   Physical Exam  Vitals and nursing note reviewed.   Constitutional:       Appearance: She is underweight.   HENT:      Head: Normocephalic.      Right Ear: Tympanic membrane normal.      Left Ear: Tympanic membrane normal.      Nose: Nose normal.      Right Sinus: Maxillary sinus tenderness and frontal sinus tenderness present.      Left Sinus: Maxillary sinus tenderness and frontal sinus tenderness present.      Mouth/Throat:      Mouth: Mucous membranes are moist.   Eyes:      Conjunctiva/sclera: Conjunctivae normal.   Cardiovascular:      Rate and Rhythm: Normal rate and regular rhythm.      Pulses: Normal pulses.      Heart sounds: Normal heart sounds.   Pulmonary:      Effort: Pulmonary effort is normal.      Breath sounds: Normal breath sounds.   Abdominal:      General: Bowel sounds are normal.      Palpations: Abdomen is soft.   Musculoskeletal:         General: Normal range of motion.      Cervical back: Normal range of motion.   Skin:     General: Skin is warm and dry.   Neurological:      General: No focal deficit present.      Mental Status: She is alert and oriented to person, place, and time.   Psychiatric:         Mood and Affect: Mood normal.         Behavior: Behavior normal.       Advance Care Planning   ACP discussion was held with the patient during this visit. Patient has an advance directive in EMR which is still valid.          Results Reviewed:  Glucose   Date Value Ref Range Status   10/17/2023 81 70 - 99 mg/dL Final   04/13/2023 100 (H) 65 - 99 mg/dL Final     BUN   Date Value Ref Range Status   10/17/2023 18 8 - 27 mg/dL Final   04/13/2023 22 8 - 23 mg/dL Final     Creatinine   Date Value Ref Range Status   10/17/2023 0.74 0.57 - 1.00 mg/dL Final   04/13/2023 0.46 (L) 0.57 - 1.00 mg/dL Final     Sodium   Date Value Ref " Range Status   10/17/2023 140 134 - 144 mmol/L Final   04/13/2023 137 136 - 145 mmol/L Final     Potassium   Date Value Ref Range Status   10/17/2023 4.1 3.5 - 5.2 mmol/L Final   04/13/2023 4.0 3.5 - 5.2 mmol/L Final     Comment:     Slight hemolysis detected by analyzer. Results may be affected.     Chloride   Date Value Ref Range Status   10/17/2023 102 96 - 106 mmol/L Final   04/13/2023 100 98 - 107 mmol/L Final     CO2   Date Value Ref Range Status   04/13/2023 30.0 (H) 22.0 - 29.0 mmol/L Final     Total CO2   Date Value Ref Range Status   10/17/2023 25 20 - 29 mmol/L Final     Calcium   Date Value Ref Range Status   10/17/2023 10.0 8.7 - 10.3 mg/dL Final   04/13/2023 9.7 8.6 - 10.5 mg/dL Final     ALT (SGPT)   Date Value Ref Range Status   10/17/2023 26 0 - 32 IU/L Final   04/12/2023 171 (H) 1 - 33 U/L Final     AST (SGOT)   Date Value Ref Range Status   10/17/2023 28 0 - 40 IU/L Final   04/12/2023 65 (H) 1 - 32 U/L Final     WBC   Date Value Ref Range Status   10/17/2023 7.9 3.4 - 10.8 x10E3/uL Final     Hematocrit   Date Value Ref Range Status   10/17/2023 42.7 34.0 - 46.6 % Final   04/14/2023 39.3 34.0 - 46.6 % Final     Platelets   Date Value Ref Range Status   10/17/2023 271 150 - 450 x10E3/uL Final   04/14/2023 246 140 - 450 10*3/mm3 Final     Total Cholesterol   Date Value Ref Range Status   04/11/2023 162 0 - 200 mg/dL Final     Triglycerides   Date Value Ref Range Status   04/11/2023 93 0 - 150 mg/dL Final     HDL Cholesterol   Date Value Ref Range Status   04/11/2023 66 (H) 40 - 60 mg/dL Final     LDL Cholesterol    Date Value Ref Range Status   04/11/2023 79 0 - 100 mg/dL Final     LDL/HDL Ratio   Date Value Ref Range Status   04/11/2023 1.17  Final     Hemoglobin A1C   Date Value Ref Range Status   12/05/2017 5.10 % Final     Comment:     Less than 6.0           Non-Diabetic Range  6.0-7.0                 ADA Therapeutic Target  Greater than 7.0        Action Suggested           Assessment / Plan      Assessment/Plan     Diagnoses and all orders for this visit:    1. Acute non-recurrent pansinusitis (Primary)  -     azithromycin (Zithromax Z-Pardeep) 250 MG tablet; Take 2 tablets by mouth on day 1, then 1 tablet daily on days 2-5  Dispense: 6 tablet; Refill: 0    2. Antibiotic-induced yeast infection  -     fluconazole (Diflucan) 150 MG tablet; Take 1 tablet by mouth 1 (One) Time for 1 dose.  Dispense: 1 tablet; Refill: 0    3. Underweight         An After Visit Summary was printed and given to the patient at discharge.  Return if symptoms worsen or fail to improve.    I have discussed the patient results/orders and plan/recommendation with them at today's visit.      Ave Canada, APRN   10/26/2023

## 2023-10-31 DIAGNOSIS — J30.2 SEASONAL ALLERGIC RHINITIS, UNSPECIFIED TRIGGER: Primary | ICD-10-CM

## 2023-10-31 RX ORDER — AZELASTINE 1 MG/ML
2 SPRAY, METERED NASAL 2 TIMES DAILY
Qty: 30 ML | Refills: 12 | Status: SHIPPED | OUTPATIENT
Start: 2023-10-31

## 2023-11-06 PROBLEM — R63.6 UNDERWEIGHT: Status: ACTIVE | Noted: 2023-11-06

## 2023-12-06 DIAGNOSIS — R05.9 COUGH, UNSPECIFIED TYPE: ICD-10-CM

## 2023-12-06 RX ORDER — GUAIFENESIN 600 MG/1
1200 TABLET, EXTENDED RELEASE ORAL 2 TIMES DAILY
Qty: 120 TABLET | Refills: 2 | Status: SHIPPED | OUTPATIENT
Start: 2023-12-06

## 2023-12-06 NOTE — TELEPHONE ENCOUNTER
Pending Prescriptions:                       Disp   Refills    Mucus Relief 600 MG 12 hr tablet [Pharmacy*120 ta*2        Sig: TAKE TWO TABLETS BY MOUTH TWICE A DAY

## 2023-12-19 ENCOUNTER — OFFICE VISIT (OUTPATIENT)
Dept: OBSTETRICS AND GYNECOLOGY | Facility: CLINIC | Age: 68
End: 2023-12-19
Payer: MEDICARE

## 2023-12-19 VITALS
WEIGHT: 113 LBS | HEIGHT: 71 IN | DIASTOLIC BLOOD PRESSURE: 84 MMHG | SYSTOLIC BLOOD PRESSURE: 132 MMHG | BODY MASS INDEX: 15.82 KG/M2

## 2023-12-19 DIAGNOSIS — Z13.820 OSTEOPOROSIS SCREENING: ICD-10-CM

## 2023-12-19 DIAGNOSIS — Z12.31 SCREENING MAMMOGRAM, ENCOUNTER FOR: ICD-10-CM

## 2023-12-19 DIAGNOSIS — Z78.0 POST-MENOPAUSAL: ICD-10-CM

## 2023-12-19 DIAGNOSIS — Z01.419 ENCOUNTER FOR GYNECOLOGICAL EXAMINATION WITHOUT ABNORMAL FINDING: Primary | ICD-10-CM

## 2023-12-19 NOTE — PATIENT INSTRUCTIONS

## 2023-12-19 NOTE — PROGRESS NOTES
"Chief Complaint  Annual Exam (Pt is here for an annual exam/HX of hysterectomy /Last mammogram 1/25/22 Normal/Last dexa 1/25/22 Osteoporosis(declined referral to Osteoporosis clinic at that time) /Pt has no complaints today )    Subjective          Lilli Trivedi presents to [facility]  History of Present Illness    The patient presents for an annual exam.    Shingles in 9/2023.   Bronchitis after allergic reaction.   Fall lead to rehab stay to regain strength.     She denies any problems with constipation or diarrhea.   She has not had a colonoscopy.   She has done the Cologuard in the past because she was constipated and had some bleeding.    Her sister had breast cancer in 06/2023.      Review of Systems   Constitutional:  Negative for activity change, appetite change, fatigue and fever.   HENT:  Negative for congestion, sore throat and trouble swallowing.    Eyes:  Negative for pain, discharge and visual disturbance.   Respiratory:  Negative for apnea, shortness of breath and wheezing.    Cardiovascular:  Negative for chest pain, palpitations and leg swelling.   Gastrointestinal:  Negative for abdominal pain, constipation and diarrhea.   Genitourinary:  Negative for dysuria, flank pain, frequency, pelvic pain, urgency, vaginal bleeding and vaginal discharge.   Musculoskeletal:  Positive for arthralgias.   Skin:  Negative for color change and rash.   Neurological:  Negative for numbness.   Psychiatric/Behavioral:  Negative for confusion and sleep disturbance.         Objective   Vital Signs:   /84   Ht 180.3 cm (71\")   Wt 51.3 kg (113 lb)   BMI 15.76 kg/m²     Physical Exam  Vitals and nursing note reviewed. Exam conducted with a chaperone present.   Constitutional:       Appearance: She is well-developed.   HENT:      Head: Normocephalic and atraumatic.      Right Ear: External ear normal.      Left Ear: External ear normal.   Eyes:      General: No scleral icterus.        Right " eye: No discharge.         Left eye: No discharge.      Conjunctiva/sclera: Conjunctivae normal.   Neck:      Thyroid: No thyromegaly.      Vascular: No carotid bruit.   Cardiovascular:      Rate and Rhythm: Regular rhythm.      Heart sounds: Normal heart sounds. No murmur heard.  Pulmonary:      Effort: Pulmonary effort is normal. No respiratory distress.      Breath sounds: Normal breath sounds.   Chest:   Breasts:     Breasts are symmetrical.      Right: No inverted nipple, mass, nipple discharge, skin change or tenderness.      Left: No inverted nipple, mass, nipple discharge, skin change or tenderness.   Abdominal:      General: Bowel sounds are normal. There is no distension.      Palpations: Abdomen is soft. There is no mass.      Tenderness: There is no abdominal tenderness. There is no guarding.      Hernia: No hernia is present. There is no hernia in the left inguinal area.   Genitourinary:     Exam position: Lithotomy position.      Labia:         Right: No rash, tenderness, lesion or injury.         Left: No rash, tenderness, lesion or injury.       Vagina: Normal. No signs of injury. No vaginal discharge, erythema or bleeding.      Comments: Cervix, Uterus and Adnexa are surgically absent.  Urethra and urethral meatus normal  Bladder, normal no prolapse  Perineum and anus examined and without lesions  Musculoskeletal:         General: No tenderness. Normal range of motion.      Cervical back: Normal range of motion and neck supple.   Lymphadenopathy:      Head:      Right side of head: No submental, submandibular, tonsillar, preauricular, posterior auricular or occipital adenopathy.      Left side of head: No submental, submandibular, tonsillar, preauricular, posterior auricular or occipital adenopathy.      Cervical: No cervical adenopathy.      Right cervical: No superficial, deep or posterior cervical adenopathy.     Left cervical: No superficial, deep or posterior cervical adenopathy.   Skin:      General: Skin is warm and dry.      Findings: No bruising, erythema or rash.   Neurological:      Mental Status: She is alert and oriented to person, place, and time.      Motor: No abnormal muscle tone.      Coordination: Coordination normal.   Psychiatric:         Behavior: Behavior normal.         Thought Content: Thought content normal.         Judgment: Judgment normal.         Result Review :   The following data was reviewed by: RASHI Tejeda on 12/19/2023:    Data reviewed : Radiologic studies mammogram and dexa scan               Assessment and Plan      Well woman GYN exam.   Pap smear not indicates per ASCCP guidelines.   Will have lab work at PCP.     Colonoscopy/cologuard followed by PCP.     Bone density ordered.     Discussed STD prevention and testing.   Pt declines Chlamydia/Gonorrhea/Trichomonas, RPR, Hep panel and HIV testing.     Mammogram will be scheduled at [facility].     Genetic testing done already.     Educational material provided related to breast self awareness, exercising to stay healthy, calcium/vitamin D, and Kegel exercises.       Diagnoses and all orders for this visit:    1. Encounter for gynecological examination without abnormal finding (Primary)    2. Screening mammogram, encounter for  -     Mammo Screening Digital Tomosynthesis Bilateral With CAD; Future    3. Post-menopausal  -     DEXA Bone Density Axial; Future    4. Osteoporosis screening  -     DEXA Bone Density Axial; Future                    Follow Up   Return for Annual physical.    Patient was given instructions and counseling regarding her condition or for health maintenance advice. Please see specific information pulled into the AVS if appropriate.         Transcribed from ambient dictation for RASHI Tejeda by Velma Ulloa.  12/19/23   14:51 CST    Patient or patient representative verbalized consent to the visit recording.  I have personally performed the services described in this document as  transcribed by the above individual, and it is both accurate and complete.  Minal Winter, APRN  12/30/2023  21:33 CST

## 2024-01-10 ENCOUNTER — HOSPITAL ENCOUNTER (OUTPATIENT)
Dept: MAMMOGRAPHY | Facility: HOSPITAL | Age: 69
Discharge: HOME | End: 2024-01-10
Payer: MEDICARE

## 2024-01-10 ENCOUNTER — HOSPITAL ENCOUNTER (OUTPATIENT)
Dept: BONE DENSITY | Facility: HOSPITAL | Age: 69
Discharge: HOME | End: 2024-01-10
Payer: MEDICARE

## 2024-01-10 DIAGNOSIS — Z12.31 SCREENING MAMMOGRAM, ENCOUNTER FOR: ICD-10-CM

## 2024-01-10 DIAGNOSIS — Z78.0 POST-MENOPAUSAL: ICD-10-CM

## 2024-01-10 DIAGNOSIS — Z13.820 OSTEOPOROSIS SCREENING: ICD-10-CM

## 2024-01-10 PROCEDURE — 77063 BREAST TOMOSYNTHESIS BI: CPT

## 2024-01-10 PROCEDURE — 77080 DXA BONE DENSITY AXIAL: CPT

## 2024-01-10 PROCEDURE — 77067 SCR MAMMO BI INCL CAD: CPT

## 2024-01-23 ENCOUNTER — HOSPITAL ENCOUNTER (OUTPATIENT)
Dept: MAMMOGRAPHY | Facility: HOSPITAL | Age: 69
Discharge: HOME | End: 2024-01-23
Payer: MEDICARE

## 2024-01-23 ENCOUNTER — HOSPITAL ENCOUNTER (OUTPATIENT)
Dept: ULTRASOUND IMAGING | Facility: HOSPITAL | Age: 69
Discharge: HOME | End: 2024-01-23
Payer: MEDICARE

## 2024-01-23 DIAGNOSIS — R92.8 ABNORMAL MAMMOGRAM: ICD-10-CM

## 2024-01-23 PROCEDURE — 76642 ULTRASOUND BREAST LIMITED: CPT

## 2024-01-23 PROCEDURE — 77065 DX MAMMO INCL CAD UNI: CPT

## 2024-01-23 PROCEDURE — G0279 TOMOSYNTHESIS, MAMMO: HCPCS

## 2024-01-29 DIAGNOSIS — R92.8 ABNORMAL MAMMOGRAM: Primary | ICD-10-CM

## 2024-01-30 ENCOUNTER — TELEPHONE (OUTPATIENT)
Dept: OBSTETRICS AND GYNECOLOGY | Age: 69
End: 2024-01-30
Payer: MEDICARE

## 2024-01-30 DIAGNOSIS — R92.8 ABNORMAL MAMMOGRAM: Primary | ICD-10-CM

## 2024-01-30 NOTE — TELEPHONE ENCOUNTER
Pt returned a call regarding her mammogram results. I spoke with pt about her results. Pt voiced understanding on the need for a 6 month follow up. I advised pt that a referral will be made to Dr. Letha Silva. Pt states she is not interested in the referral at this time. She would like to wait until she received the results of her 6 month follow up. Just FYI

## 2024-01-30 NOTE — TELEPHONE ENCOUNTER
----- Message from RASHI Tejeda sent at 1/29/2024  3:34 PM CST -----  6 month follow up right breast mammogram recommended.     I recommend a referral to surgeon.   I will submit the order when pt is informed.     [employer] message sent.

## 2024-02-19 ENCOUNTER — OFFICE VISIT (OUTPATIENT)
Dept: FAMILY MEDICINE CLINIC | Facility: CLINIC | Age: 69
End: 2024-02-19
Payer: MEDICARE

## 2024-02-19 VITALS
HEIGHT: 71 IN | DIASTOLIC BLOOD PRESSURE: 75 MMHG | WEIGHT: 118 LBS | BODY MASS INDEX: 16.52 KG/M2 | HEART RATE: 86 BPM | SYSTOLIC BLOOD PRESSURE: 137 MMHG | RESPIRATION RATE: 18 BRPM | TEMPERATURE: 99.3 F | OXYGEN SATURATION: 98 %

## 2024-02-19 DIAGNOSIS — J01.40 ACUTE NON-RECURRENT PANSINUSITIS: ICD-10-CM

## 2024-02-19 DIAGNOSIS — T36.95XA ANTIBIOTIC-INDUCED YEAST INFECTION: Primary | ICD-10-CM

## 2024-02-19 DIAGNOSIS — R63.6 UNDERWEIGHT: ICD-10-CM

## 2024-02-19 DIAGNOSIS — B37.9 ANTIBIOTIC-INDUCED YEAST INFECTION: Primary | ICD-10-CM

## 2024-02-19 RX ORDER — FLUCONAZOLE 150 MG/1
150 TABLET ORAL ONCE
Qty: 1 TABLET | Refills: 0 | Status: SHIPPED | OUTPATIENT
Start: 2024-02-19 | End: 2024-02-19

## 2024-02-19 RX ORDER — AZITHROMYCIN 250 MG/1
TABLET, FILM COATED ORAL
Qty: 6 TABLET | Refills: 0 | Status: SHIPPED | OUTPATIENT
Start: 2024-02-19

## 2024-02-19 NOTE — PROGRESS NOTES
Subjective     Chief Complaint   Patient presents with    Sinus Problem       History of Present Illness    Patient presents today with sinusitis.  She is allergic to perfumes and was recently around someone wearing perfumes.  Since then she has experienced nasal drainage, rhinorrhea, cough, and sinus pressure.  She is taking mucinex and her allergy medication.      Patient's PMR from outside medical facility reviewed and noted.    Review of Systems     Otherwise complete ROS reviewed and negative except as mentioned in the HPI.    Past Medical History:   Past Medical History:   Diagnosis Date    Disease of thyroid gland      Past Surgical History:  Past Surgical History:   Procedure Laterality Date    HAND SURGERY      HYSTERECTOMY      with BSO    TONSILLECTOMY       Social History:  reports that she has never smoked. She has never used smokeless tobacco. She reports current alcohol use. She reports that she does not use drugs.    Family History: family history includes Breast cancer in her sister; Breast cancer (age of onset: 80) in her maternal grandmother; Lung cancer in her father; No Known Problems in her mother; Prostate cancer (age of onset: 70) in her maternal grandfather; Prostate cancer (age of onset: 80) in her brother.      Allergies:  Allergies   Allergen Reactions    Barley Grass Shortness Of Breath and Rash    Broccoli [Brassica Oleracea] Shortness Of Breath and Rash    Cinnamon Anaphylaxis    Mustard Shortness Of Breath and Rash     Sore throat     Mustard Seed Anaphylaxis    Peanut-Containing Drug Products Anaphylaxis    Penicillins Shortness Of Breath    Prednisone Anaphylaxis     Medications:  Prior to Admission medications    Medication Sig Start Date End Date Taking? Authorizing Provider   albuterol sulfate  (90 Base) MCG/ACT inhaler Inhale 2 puffs Every 4 (Four) Hours As Needed for Wheezing. 5/8/23  Yes Vanessa Regalado APRN   azelastine (ASTELIN) 0.1 % nasal spray 2  sprays into the nostril(s) as directed by provider 2 (Two) Times a Day. Use in each nostril as directed 10/31/23  Yes Ave Canada APRN   b complex vitamins capsule Take 1 capsule by mouth Daily.   Yes Abbi De Jesus MD   calcium carbonate (OS-SHAISTA) 600 MG tablet Take 1 tablet by mouth Daily.   Yes Abbi De Jesus MD   Cholecalciferol (VITAMIN D3) 2000 units capsule Take 1 capsule by mouth Daily.   Yes Abbi De Jesus MD   Magnesium Oxide -Mg Supplement 200 MG tablet Take 1 tablet twice a day by oral route.   Yes Abbi De Jesus MD   Mucus Relief 600 MG 12 hr tablet TAKE TWO TABLETS BY MOUTH TWICE A DAY 12/6/23  Yes Ave Canada APRN   Omega-3 Fatty Acids (fish oil) 1000 MG capsule capsule Take  by mouth Daily With Breakfast.   Yes Abbi De Jesus MD   Potassium Bicarb-Citric Acid (Effer-K) 10 MEQ effervescent tablet 10 mEq. 4/11/23  Yes Abbi De Jesus MD   thyroid (ARMOUR) 15 MG tablet Take 1 tablet by mouth Daily.   Yes Abbi De Jesus MD   Zinc 50 MG tablet Take 1 tablet by mouth Daily.   Yes Abbi De Jesus MD   apixaban (ELIQUIS) 5 MG tablet tablet Take 1 tablet by mouth Every 12 (Twelve) Hours. Indications: Atrial Fibrillation  Patient not taking: Reported on 12/19/2023 4/14/23   Jennifer Nelson APRN   azithromycin (Zithromax Z-Pardeep) 250 MG tablet Take 2 tablets by mouth on day 1, then 1 tablet daily on days 2-5  Patient not taking: Reported on 12/19/2023 10/26/23   Ave Canada APRN   Cholecalciferol 50 MCG (2000 UT) capsule Take 1 capsule by mouth.  Patient not taking: Reported on 12/19/2023    Abbi De Jesus MD   doxepin (SINEquan) 100 MG capsule Take 1 capsule by mouth 2 (Two) Times a Day.  Patient not taking: Reported on 12/19/2023    Abbi De Jesus MD   fluticasone (FLONASE) 50 MCG/ACT nasal spray 2 sprays into the nostril(s) as directed by provider Daily.  Patient not taking: Reported on 12/19/2023 8/31/23    Ave Canada APRN   MAGNESIUM PO Take 200 mg by mouth Daily.  Patient not taking: Reported on 12/19/2023    Abbi De Jesus MD   metoprolol tartrate (LOPRESSOR) 25 MG tablet Take 1 tablet by mouth 2 (Two) Times a Day As Needed (palpitations).  Patient not taking: Reported on 12/19/2023 6/29/23   Taylor Ruby APRN   mupirocin (BACTROBAN) 2 % ointment Apply 1 application  topically to the appropriate area as directed 3 (Three) Times a Day.  Patient not taking: Reported on 12/19/2023    Abbi De Jesus MD   prednisoLONE acetate (PRED FORTE) 1 % ophthalmic suspension Administer 1 drop into the left eye 4 (Four) Times a Day.  Patient not taking: Reported on 12/19/2023 9/7/23   Abbi De Jesus MD   predniSONE (DELTASONE) 10 MG tablet Take 1 tablet by mouth Daily.  Patient not taking: Reported on 12/19/2023 9/12/23   Vanessa Regalado APRN   valACYclovir (Valtrex) 1000 MG tablet Take 1 tablet by mouth 2 (Two) Times a Day.  Patient not taking: Reported on 12/19/2023    Abbi De Jesus MD     PHQ-9 Depression Screening  Little interest or pleasure in doing things? 0-->not at all   Feeling down, depressed, or hopeless? 0-->not at all   Trouble falling or staying asleep, or sleeping too much?     Feeling tired or having little energy?     Poor appetite or overeating?     Feeling bad about yourself - or that you are a failure or have let yourself or your family down?     Trouble concentrating on things, such as reading the newspaper or watching television?     Moving or speaking so slowly that other people could have noticed? Or the opposite - being so fidgety or restless that you have been moving around a lot more than usual?     Thoughts that you would be better off dead, or of hurting yourself in some way?     PHQ-9 Total Score 0   If you checked off any problems, how difficult have these problems made it for you to do your work, take care of things at home, or get along with  "other people?         PHQ-9 Total Score: 0   0 (Negative screening for depression)  Support given, observe for worsening symptoms    Objective     Vital Signs: /75 (BP Location: Right arm, Patient Position: Sitting, Cuff Size: Adult)   Pulse 86   Temp 99.3 °F (37.4 °C) (Infrared)   Resp 18   Ht 180.3 cm (71\")   Wt 53.5 kg (118 lb)   LMP 06/28/2001 (Exact Date)   SpO2 98%   BMI 16.46 kg/m²   Physical Exam  Vitals and nursing note reviewed.   Constitutional:       Appearance: She is underweight.   HENT:      Head: Normocephalic.      Right Ear: Tympanic membrane normal.      Left Ear: Tympanic membrane normal.      Nose: Congestion present.      Right Sinus: Maxillary sinus tenderness and frontal sinus tenderness present.      Left Sinus: Maxillary sinus tenderness and frontal sinus tenderness present.      Mouth/Throat:      Mouth: Mucous membranes are moist.      Pharynx: No posterior oropharyngeal erythema.   Eyes:      Conjunctiva/sclera: Conjunctivae normal.   Cardiovascular:      Rate and Rhythm: Normal rate and regular rhythm.      Pulses: Normal pulses.      Heart sounds: Normal heart sounds.   Pulmonary:      Effort: Pulmonary effort is normal.      Breath sounds: Normal breath sounds.   Musculoskeletal:         General: Normal range of motion.      Cervical back: Normal range of motion.   Skin:     General: Skin is warm and dry.   Neurological:      General: No focal deficit present.      Mental Status: She is alert and oriented to person, place, and time.   Psychiatric:         Mood and Affect: Mood normal.         Behavior: Behavior normal.         BMI is below normal parameters (malnutrition). Recommendations: none (medical contraindication)        Advance Care Planning   ACP discussion was held with the patient during this visit. Patient has an advance directive in EMR which is still valid.          Results Reviewed:  Glucose   Date Value Ref Range Status   10/17/2023 81 70 - 99 mg/dL Final "   04/13/2023 100 (H) 65 - 99 mg/dL Final     BUN   Date Value Ref Range Status   10/17/2023 18 8 - 27 mg/dL Final   04/13/2023 22 8 - 23 mg/dL Final     Creatinine   Date Value Ref Range Status   10/17/2023 0.74 0.57 - 1.00 mg/dL Final   04/13/2023 0.46 (L) 0.57 - 1.00 mg/dL Final     Sodium   Date Value Ref Range Status   10/17/2023 140 134 - 144 mmol/L Final   04/13/2023 137 136 - 145 mmol/L Final     Potassium   Date Value Ref Range Status   10/17/2023 4.1 3.5 - 5.2 mmol/L Final   04/13/2023 4.0 3.5 - 5.2 mmol/L Final     Comment:     Slight hemolysis detected by analyzer. Results may be affected.     Chloride   Date Value Ref Range Status   10/17/2023 102 96 - 106 mmol/L Final   04/13/2023 100 98 - 107 mmol/L Final     CO2   Date Value Ref Range Status   04/13/2023 30.0 (H) 22.0 - 29.0 mmol/L Final     Total CO2   Date Value Ref Range Status   10/17/2023 25 20 - 29 mmol/L Final     Calcium   Date Value Ref Range Status   10/17/2023 10.0 8.7 - 10.3 mg/dL Final   04/13/2023 9.7 8.6 - 10.5 mg/dL Final     ALT (SGPT)   Date Value Ref Range Status   10/17/2023 26 0 - 32 IU/L Final   04/12/2023 171 (H) 1 - 33 U/L Final     AST (SGOT)   Date Value Ref Range Status   10/17/2023 28 0 - 40 IU/L Final   04/12/2023 65 (H) 1 - 32 U/L Final     WBC   Date Value Ref Range Status   10/17/2023 7.9 3.4 - 10.8 x10E3/uL Final     Hematocrit   Date Value Ref Range Status   10/17/2023 42.7 34.0 - 46.6 % Final   04/14/2023 39.3 34.0 - 46.6 % Final     Platelets   Date Value Ref Range Status   10/17/2023 271 150 - 450 x10E3/uL Final   04/14/2023 246 140 - 450 10*3/mm3 Final     Total Cholesterol   Date Value Ref Range Status   04/11/2023 162 0 - 200 mg/dL Final     Triglycerides   Date Value Ref Range Status   04/11/2023 93 0 - 150 mg/dL Final     HDL Cholesterol   Date Value Ref Range Status   04/11/2023 66 (H) 40 - 60 mg/dL Final     LDL Cholesterol    Date Value Ref Range Status   04/11/2023 79 0 - 100 mg/dL Final     LDL/HDL Ratio    Date Value Ref Range Status   04/11/2023 1.17  Final     Hemoglobin A1C   Date Value Ref Range Status   12/05/2017 5.10 % Final     Comment:     Less than 6.0           Non-Diabetic Range  6.0-7.0                 ADA Therapeutic Target  Greater than 7.0        Action Suggested           Assessment / Plan     Assessment/Plan     Diagnoses and all orders for this visit:    1. Antibiotic-induced yeast infection (Primary)  -     fluconazole (Diflucan) 150 MG tablet; Take 1 tablet by mouth 1 (One) Time for 1 dose.  Dispense: 1 tablet; Refill: 0    2. Acute non-recurrent pansinusitis  -     azithromycin (Zithromax Z-Pardeep) 250 MG tablet; Take 2 tablets by mouth on day 1, then 1 tablet daily on days 2-5  Dispense: 6 tablet; Refill: 0    3. Underweight         An After Visit Summary was printed and given to the patient at discharge.  Return if symptoms worsen or fail to improve.    I have discussed the patient results/orders and plan/recommendation with them at today's visit.      Ave Canada, APRN   02/19/2024

## 2024-02-27 ENCOUNTER — TELEPHONE (OUTPATIENT)
Dept: FAMILY MEDICINE CLINIC | Facility: CLINIC | Age: 69
End: 2024-02-27
Payer: MEDICARE

## 2024-02-27 DIAGNOSIS — J01.40 ACUTE NON-RECURRENT PANSINUSITIS: Primary | ICD-10-CM

## 2024-02-27 DIAGNOSIS — B37.9 ANTIBIOTIC-INDUCED YEAST INFECTION: ICD-10-CM

## 2024-02-27 DIAGNOSIS — T36.95XA ANTIBIOTIC-INDUCED YEAST INFECTION: ICD-10-CM

## 2024-02-27 RX ORDER — FLUCONAZOLE 150 MG/1
150 TABLET ORAL ONCE
Qty: 1 TABLET | Refills: 0 | Status: SHIPPED | OUTPATIENT
Start: 2024-02-27 | End: 2024-02-27

## 2024-02-27 RX ORDER — DOXYCYCLINE HYCLATE 100 MG/1
100 CAPSULE ORAL 2 TIMES DAILY
Qty: 10 CAPSULE | Refills: 0 | Status: SHIPPED | OUTPATIENT
Start: 2024-02-27 | End: 2024-03-03

## 2024-02-27 NOTE — TELEPHONE ENCOUNTER
Caller: Lilli Trivedi    Relationship: Self    Best call back number: [phone]     What medication are you requesting: Z-PACK ANTIBIOTICS     What are your current symptoms: COUGHING UP MUCUS, RUNNY NOSE, SINUS PAIN     How long have you been experiencing symptoms: FEW WEEKS     Have you had these symptoms before:    [x] Yes  [] No    Have you been treated for these symptoms before:   [x] Yes  [] No    If a prescription is needed, what is your preferred pharmacy and phone number: [facility] - [place], [place] - [address] [phone] [facility] [phone] FX      SAID SHE IS FEELING A BIT BETTER BUT NOT COMPLETLEY CURED.

## 2024-03-29 NOTE — TELEPHONE ENCOUNTER
Pt called office and states at her appt on 12/15/21, it was discussed for her to take a Z pack and diflucan.  Pt states neither have been ordered to her pharmacy.  Please advised if these need ordered.  No medications pended.    
The chart was not signed the meds have been sent.   Sorry for the delay  
Follow up with your primary care doctor or clinics listed below if you do not have a doctor  Return immediately for any new or worsening symptoms or any new concerns

## 2024-07-29 ENCOUNTER — HOSPITAL ENCOUNTER (OUTPATIENT)
Dept: MAMMOGRAPHY | Facility: HOSPITAL | Age: 69
Discharge: HOME | End: 2024-07-29
Payer: MEDICARE

## 2024-07-29 ENCOUNTER — HOSPITAL ENCOUNTER (OUTPATIENT)
Dept: ULTRASOUND IMAGING | Facility: HOSPITAL | Age: 69
Discharge: HOME | End: 2024-07-29
Payer: MEDICARE

## 2024-07-29 DIAGNOSIS — R92.8 ABNORMAL MAMMOGRAM: ICD-10-CM

## 2024-07-29 PROCEDURE — G0279 TOMOSYNTHESIS, MAMMO: HCPCS

## 2024-07-29 PROCEDURE — 77065 DX MAMMO INCL CAD UNI: CPT

## 2024-10-24 ENCOUNTER — OFFICE VISIT (OUTPATIENT)
Dept: INTERNAL MEDICINE | Facility: CLINIC | Age: 69
End: 2024-10-24
Payer: MEDICARE

## 2024-10-24 VITALS
HEIGHT: 71 IN | OXYGEN SATURATION: 94 % | HEART RATE: 69 BPM | SYSTOLIC BLOOD PRESSURE: 127 MMHG | DIASTOLIC BLOOD PRESSURE: 72 MMHG | TEMPERATURE: 97.4 F | WEIGHT: 116 LBS | BODY MASS INDEX: 16.24 KG/M2 | RESPIRATION RATE: 19 BRPM

## 2024-10-24 DIAGNOSIS — R74.01 TRANSAMINITIS: ICD-10-CM

## 2024-10-24 DIAGNOSIS — Z00.00 MEDICARE ANNUAL WELLNESS VISIT, SUBSEQUENT: Primary | ICD-10-CM

## 2024-10-24 DIAGNOSIS — Z11.59 ENCOUNTER FOR HEPATITIS C SCREENING TEST FOR LOW RISK PATIENT: ICD-10-CM

## 2024-10-24 PROCEDURE — 1126F AMNT PAIN NOTED NONE PRSNT: CPT | Performed by: NURSE PRACTITIONER

## 2024-10-24 PROCEDURE — 1159F MED LIST DOCD IN RCRD: CPT | Performed by: NURSE PRACTITIONER

## 2024-10-24 PROCEDURE — 1170F FXNL STATUS ASSESSED: CPT | Performed by: NURSE PRACTITIONER

## 2024-10-24 PROCEDURE — G0439 PPPS, SUBSEQ VISIT: HCPCS | Performed by: NURSE PRACTITIONER

## 2024-10-24 PROCEDURE — 1160F RVW MEDS BY RX/DR IN RCRD: CPT | Performed by: NURSE PRACTITIONER

## 2024-10-24 NOTE — PROGRESS NOTES
Subjective   The ABCs of the Annual Wellness Visit  Medicare Wellness Visit      Lilli Trivedi is a 69 y.o. patient who presents for a Medicare Wellness Visit.    The following portions of the patient's history were reviewed and   updated as appropriate: allergies, past family history, past medical history, past social history, past surgical history, and problem list.    Compared to one year ago, the patient's physical   health is better.  Compared to one year ago, the patient's mental   health is better.    Recent Hospitalizations:  She was not admitted to the hospital during the last year.     Current Medical Providers:  Patient Care Team:  Vanessa Regalado APRN as PCP - General (Nurse Practitioner)    Outpatient Medications Prior to Visit   Medication Sig Dispense Refill    albuterol sulfate  (90 Base) MCG/ACT inhaler Inhale 2 puffs Every 4 (Four) Hours As Needed for Wheezing. 8 g 1    azelastine (ASTELIN) 0.1 % nasal spray 2 sprays into the nostril(s) as directed by provider 2 (Two) Times a Day. Use in each nostril as directed 30 mL 12    azithromycin (Zithromax Z-Pardeep) 250 MG tablet Take 2 tablets by mouth on day 1, then 1 tablet daily on days 2-5 6 tablet 0    b complex vitamins capsule Take 1 capsule by mouth Daily.      calcium carbonate (OS-SHAISTA) 600 MG tablet Take 1 tablet by mouth Daily.      Cholecalciferol (VITAMIN D3) 2000 units capsule Take 1 capsule by mouth Daily.      Magnesium Oxide -Mg Supplement 200 MG tablet Take 1 tablet twice a day by oral route.      Mucus Relief 600 MG 12 hr tablet TAKE TWO TABLETS BY MOUTH TWICE A  tablet 2    Omega-3 Fatty Acids (fish oil) 1000 MG capsule capsule Take  by mouth Daily With Breakfast.      Potassium Bicarb-Citric Acid (Effer-K) 10 MEQ effervescent tablet 10 mEq.      thyroid (ARMOUR) 15 MG tablet Take 1 tablet by mouth Daily.      Zinc 50 MG tablet Take 1 tablet by mouth Daily.       No facility-administered medications prior to visit.  "    No opioid medication identified on active medication list. I have reviewed chart for other potential  high risk medication/s and harmful drug interactions in the elderly.      Aspirin is not on active medication list.  Aspirin use is not indicated based on review of current medical condition/s. Risk of harm outweighs potential benefits.  .    Patient Active Problem List   Diagnosis    Seasonal allergic rhinitis    Non-traumatic rhabdomyolysis    Transaminitis    Acute traumatic displaced 4th and 5th metacarpal fracture of the left hand    Closed fracture of shaft of fourth metacarpal bone of left hand, initial encounter    Underweight     Advance Care Planning Advance Directive is on file.  ACP discussion was held with the patient during this visit. Patient has an advance directive in EMR which is still valid.             Objective   Vitals:    10/24/24 1010   BP: 127/72   Pulse: 69   Resp: 19   Temp: 97.4 °F (36.3 °C)   SpO2: 94%   Weight: 52.6 kg (116 lb)   Height: 180.3 cm (71\")   PainSc: 0-No pain       Estimated body mass index is 16.18 kg/m² as calculated from the following:    Height as of this encounter: 180.3 cm (71\").    Weight as of this encounter: 52.6 kg (116 lb).            Does the patient have evidence of cognitive impairment? No                                                                                               Health  Risk Assessment    Smoking Status:  Social History     Tobacco Use   Smoking Status Never   Smokeless Tobacco Never     Alcohol Consumption:  Social History     Substance and Sexual Activity   Alcohol Use Yes       Fall Risk Screen  STEADI Fall Risk Assessment was completed, and patient is at LOW risk for falls.Assessment completed on:10/24/2024    Depression Screening:      10/24/2024    10:09 AM   PHQ-2/PHQ-9 Depression Screening   Little interest or pleasure in doing things Not at all   Feeling down, depressed, or hopeless Not at all     Health Habits and Functional " and Cognitive Screening:      10/24/2024    10:10 AM   Functional & Cognitive Status   Have you felt unusual stress, anger or loneliness in the last month? No           Age-appropriate Screening Schedule:  Refer to the list below for future screening recommendations based on patient's age, sex and/or medical conditions. Orders for these recommended tests are listed in the plan section. The patient has been provided with a written plan.    Health Maintenance List  Health Maintenance   Topic Date Due    ZOSTER VACCINE (1 of 2) Never done    HEPATITIS C SCREENING  Never done    Pneumococcal Vaccine 65+ (1 of 1 - PCV) Never done    BMI FOLLOWUP  05/30/2024    COVID-19 Vaccine (1 - 2023-24 season) 10/26/2024 (Originally 9/1/2024)    INFLUENZA VACCINE  03/31/2025 (Originally 8/1/2024)    ANNUAL WELLNESS VISIT  10/24/2025    DXA SCAN  01/10/2026    MAMMOGRAM  07/29/2026    COLORECTAL CANCER SCREENING  09/26/2029    TDAP/TD VACCINES (2 - Td or Tdap) 08/23/2032    PAP SMEAR  Discontinued                                                                                                                                                CMS Preventative Services Quick Reference  Risk Factors Identified During Encounter  None Identified    The above risks/problems have been discussed with the patient.  Pertinent information has been shared with the patient in the After Visit Summary.  An After Visit Summary and PPPS were made available to the patient.    Follow Up:   Next Medicare Wellness visit to be scheduled in 1 year.     Assessment & Plan  Medicare annual wellness visit, subsequent    Transaminitis    Encounter for hepatitis C screening test for low risk patient      Orders Placed This Encounter   Procedures    Comprehensive Metabolic Panel    Hepatitis C Antibody    CBC & Differential           Follow Up:   Return in about 1 year (around 10/24/2025) for Medicare Wellness.

## 2024-10-25 LAB
ALBUMIN SERPL-MCNC: 4.4 G/DL (ref 3.9–4.9)
ALP SERPL-CCNC: 127 IU/L (ref 44–121)
ALT SERPL-CCNC: 23 IU/L (ref 0–32)
AST SERPL-CCNC: 26 IU/L (ref 0–40)
BASOPHILS # BLD AUTO: 0.1 X10E3/UL (ref 0–0.2)
BASOPHILS NFR BLD AUTO: 1 %
BILIRUB SERPL-MCNC: 0.5 MG/DL (ref 0–1.2)
BUN SERPL-MCNC: 20 MG/DL (ref 8–27)
BUN/CREAT SERPL: 27 (ref 12–28)
CALCIUM SERPL-MCNC: 10 MG/DL (ref 8.7–10.3)
CHLORIDE SERPL-SCNC: 102 MMOL/L (ref 96–106)
CO2 SERPL-SCNC: 22 MMOL/L (ref 20–29)
CREAT SERPL-MCNC: 0.75 MG/DL (ref 0.57–1)
EGFRCR SERPLBLD CKD-EPI 2021: 86 ML/MIN/1.73
EOSINOPHIL # BLD AUTO: 0.2 X10E3/UL (ref 0–0.4)
EOSINOPHIL NFR BLD AUTO: 3 %
ERYTHROCYTE [DISTWIDTH] IN BLOOD BY AUTOMATED COUNT: 12.4 % (ref 11.7–15.4)
GLOBULIN SER CALC-MCNC: 2.1 G/DL (ref 1.5–4.5)
GLUCOSE SERPL-MCNC: 86 MG/DL (ref 70–99)
HCT VFR BLD AUTO: 43.8 % (ref 34–46.6)
HCV IGG SERPL QL IA: NON REACTIVE
HGB BLD-MCNC: 14.1 G/DL (ref 11.1–15.9)
IMM GRANULOCYTES # BLD AUTO: 0 X10E3/UL (ref 0–0.1)
IMM GRANULOCYTES NFR BLD AUTO: 0 %
LYMPHOCYTES # BLD AUTO: 1.4 X10E3/UL (ref 0.7–3.1)
LYMPHOCYTES NFR BLD AUTO: 17 %
MCH RBC QN AUTO: 30.1 PG (ref 26.6–33)
MCHC RBC AUTO-ENTMCNC: 32.2 G/DL (ref 31.5–35.7)
MCV RBC AUTO: 94 FL (ref 79–97)
MONOCYTES # BLD AUTO: 0.7 X10E3/UL (ref 0.1–0.9)
MONOCYTES NFR BLD AUTO: 9 %
NEUTROPHILS # BLD AUTO: 5.9 X10E3/UL (ref 1.4–7)
NEUTROPHILS NFR BLD AUTO: 70 %
PLATELET # BLD AUTO: 241 X10E3/UL (ref 150–450)
POTASSIUM SERPL-SCNC: 4.2 MMOL/L (ref 3.5–5.2)
PROT SERPL-MCNC: 6.5 G/DL (ref 6–8.5)
RBC # BLD AUTO: 4.68 X10E6/UL (ref 3.77–5.28)
SODIUM SERPL-SCNC: 139 MMOL/L (ref 134–144)
WBC # BLD AUTO: 8.3 X10E3/UL (ref 3.4–10.8)

## 2025-02-12 ENCOUNTER — OFFICE VISIT (OUTPATIENT)
Age: 70
End: 2025-02-12
Payer: MEDICARE

## 2025-02-12 VITALS — HEIGHT: 71 IN | BODY MASS INDEX: 16.19 KG/M2 | WEIGHT: 115.6 LBS

## 2025-02-12 DIAGNOSIS — S52.509A NONDISPLACED FRACTURE OF DISTAL END OF RADIUS: Primary | ICD-10-CM

## 2025-02-12 DIAGNOSIS — M25.531 RIGHT WRIST PAIN: ICD-10-CM

## 2025-02-12 PROCEDURE — 99214 OFFICE O/P EST MOD 30 MIN: CPT | Performed by: PHYSICIAN ASSISTANT

## 2025-02-12 PROCEDURE — 1123F ACP DISCUSS/DSCN MKR DOCD: CPT | Performed by: PHYSICIAN ASSISTANT

## 2025-02-12 PROCEDURE — G8419 CALC BMI OUT NRM PARAM NOF/U: HCPCS | Performed by: PHYSICIAN ASSISTANT

## 2025-02-12 PROCEDURE — 3017F COLORECTAL CA SCREEN DOC REV: CPT | Performed by: PHYSICIAN ASSISTANT

## 2025-02-12 PROCEDURE — 1159F MED LIST DOCD IN RCRD: CPT | Performed by: PHYSICIAN ASSISTANT

## 2025-02-12 PROCEDURE — G8399 PT W/DXA RESULTS DOCUMENT: HCPCS | Performed by: PHYSICIAN ASSISTANT

## 2025-02-12 PROCEDURE — 1036F TOBACCO NON-USER: CPT | Performed by: PHYSICIAN ASSISTANT

## 2025-02-12 PROCEDURE — 1090F PRES/ABSN URINE INCON ASSESS: CPT | Performed by: PHYSICIAN ASSISTANT

## 2025-02-12 PROCEDURE — G8427 DOCREV CUR MEDS BY ELIG CLIN: HCPCS | Performed by: PHYSICIAN ASSISTANT

## 2025-02-12 RX ORDER — CHLORAL HYDRATE 500 MG
CAPSULE ORAL
COMMUNITY

## 2025-02-12 ASSESSMENT — ENCOUNTER SYMPTOMS: COLOR CHANGE: 0

## 2025-02-12 NOTE — ASSESSMENT & PLAN NOTE
AP, lateral, oblique x-ray views were obtained today in office of the right wrist and demonstrate a lucency of the distal radius concerning for a nondisplaced fracture as well as some mild soft tissue swelling.    We will place the patient in a removable brace for her to wear at all times for the next 2 weeks and then we will see her back to hopefully discontinue this at that time.  She can use over-the-counter Tylenol and ice and elevation for pain relief

## 2025-02-12 NOTE — PROGRESS NOTES
[facility]  [facility]  [address] [facility] [place] [address]  Dept: [phone]  Dept Fax: [phone]  Loc: [phone]    Cornelia Eagle is a 69 y.o. female who presents today for her medical conditions/complaints as noted below.  Cornelia Eagle is complaining of Consultation (Right Wrist)        HPI:   Patient is a pleasant 69-year-old female presenting to the clinic today about 5 days out from an injury where she fell onto an outstretched wrist.  She thought the pain would \"go away on its own\" but unfortunately it has not.  She has been utilizing a wrist brace that she had at home which does seem to provide some comfort to the wrist.        Past Medical History:   Diagnosis Date    Allergic rhinitis     Atrial fibrillation (HCC)     Hashimoto's disease     Hypertension     Hypoglycemia        Past Surgical History:   Procedure Laterality Date    APPENDECTOMY      FINGER SURGERY Left 4/20/2023    OPEN REDUCTION INTERNAL FIXATION LEFT 4TH & 5TH METACARPALS performed by Clayton Hsu MD at [facility] OR    HYSTERECTOMY (CERVIX STATUS UNKNOWN)      SLICK AND BSO (CERVIX REMOVED)      TONSILLECTOMY         Family History   Problem Relation Age of Onset    COPD Mother     Neuropathy Mother     Cancer Father         lung    Prostate Cancer Father 70    Heart Disease Sister     Breast Cancer Sister 81        CHRISTIANO positive    Cancer Brother         prostate    Prostate Cancer Brother 50    Cancer Maternal Grandmother         breast    Breast Cancer Maternal Grandmother 70    Breast Cancer Cousin 65        CHRISTIANO positive       Social History     Tobacco Use    Smoking status: Never    Smokeless tobacco: Never   Substance Use Topics    Alcohol use: No        Current Outpatient Medications   Medication Sig Dispense Refill    Omega-3 Fatty Acids (FISH OIL) 1000 MG capsule Take by mouth daily (with breakfast)      guaiFENesin (MUCINEX) 600 MG extended release tablet Take 2

## 2025-02-26 ENCOUNTER — OFFICE VISIT (OUTPATIENT)
Age: 70
End: 2025-02-26
Payer: MEDICARE

## 2025-02-26 VITALS — BODY MASS INDEX: 16.1 KG/M2 | WEIGHT: 115 LBS | HEIGHT: 71 IN

## 2025-02-26 DIAGNOSIS — S52.509A NONDISPLACED FRACTURE OF DISTAL END OF RADIUS: Primary | ICD-10-CM

## 2025-02-26 PROCEDURE — 1159F MED LIST DOCD IN RCRD: CPT | Performed by: PHYSICIAN ASSISTANT

## 2025-02-26 PROCEDURE — G8399 PT W/DXA RESULTS DOCUMENT: HCPCS | Performed by: PHYSICIAN ASSISTANT

## 2025-02-26 PROCEDURE — 99214 OFFICE O/P EST MOD 30 MIN: CPT | Performed by: PHYSICIAN ASSISTANT

## 2025-02-26 PROCEDURE — G8419 CALC BMI OUT NRM PARAM NOF/U: HCPCS | Performed by: PHYSICIAN ASSISTANT

## 2025-02-26 PROCEDURE — 1036F TOBACCO NON-USER: CPT | Performed by: PHYSICIAN ASSISTANT

## 2025-02-26 PROCEDURE — G8427 DOCREV CUR MEDS BY ELIG CLIN: HCPCS | Performed by: PHYSICIAN ASSISTANT

## 2025-02-26 PROCEDURE — 1090F PRES/ABSN URINE INCON ASSESS: CPT | Performed by: PHYSICIAN ASSISTANT

## 2025-02-26 PROCEDURE — 3017F COLORECTAL CA SCREEN DOC REV: CPT | Performed by: PHYSICIAN ASSISTANT

## 2025-02-26 PROCEDURE — 1123F ACP DISCUSS/DSCN MKR DOCD: CPT | Performed by: PHYSICIAN ASSISTANT

## 2025-02-26 ASSESSMENT — ENCOUNTER SYMPTOMS
COLOR CHANGE: 0
BACK PAIN: 0

## 2025-02-26 NOTE — ASSESSMENT & PLAN NOTE
Radiographic evaluation today in office demonstrates a well healing distal radius buckle fracture with resolution of the associated soft tissue swelling.    The patient can slowly begin discontinuing the use of the brace at this time and working on gentle range of motion.  She is still to limit weightbearing of the extremity for another 2 weeks and is to use over-the-counter Tylenol for pain relief.  We will see her back as needed

## 2025-02-26 NOTE — PROGRESS NOTES
Prescriptions    No medications on file        Return if symptoms worsen or fail to improve.          Discussed use, benefits, and side effects of any prescribed medications. All patient questions were answered. Patient voiced understanding of care plan.   Patient was given educational materials - see patient instructions below.         Electronically signed by DIANE Casey on 2/26/2025 at 2:43 PM

## 2025-03-10 ENCOUNTER — TELEPHONE (OUTPATIENT)
Dept: INTERNAL MEDICINE | Facility: CLINIC | Age: 70
End: 2025-03-10
Payer: MEDICARE

## 2025-03-10 ENCOUNTER — TELEPHONE (OUTPATIENT)
Dept: FAMILY MEDICINE CLINIC | Facility: CLINIC | Age: 70
End: 2025-03-10

## 2025-03-10 DIAGNOSIS — R05.9 COUGH, UNSPECIFIED TYPE: ICD-10-CM

## 2025-03-10 RX ORDER — GUAIFENESIN 600 MG/1
1200 TABLET, EXTENDED RELEASE ORAL 2 TIMES DAILY
Qty: 120 TABLET | Refills: 2 | Status: SHIPPED | OUTPATIENT
Start: 2025-03-10

## 2025-03-10 NOTE — TELEPHONE ENCOUNTER
Caller: Lilli Trivedi    Relationship: Self    Best call back number: [phone]     What is the best time to reach you: ANYTIME    Who are you requesting to speak with (clinical staff, provider,  specific staff member): CLINICAL    What was the call regarding: MUCUS RELIEF MEDICATION    Is it okay if the provider responds through MyChart: NO

## 2025-03-10 NOTE — TELEPHONE ENCOUNTER
Caller: Lilli Trivedi    Relationship: Self    Best call back number: [phone]     What is the best time to reach you: ANYTIME    Who are you requesting to speak with (clinical staff, provider,  specific staff member): CLINICAL    What was the call regarding: MUCUS RELIEF MEDICATION QUESTION    Is it okay if the provider responds through MyChart: NO

## 2025-03-11 DIAGNOSIS — J30.2 SEASONAL ALLERGIC RHINITIS, UNSPECIFIED TRIGGER: ICD-10-CM

## 2025-03-11 RX ORDER — AZELASTINE 1 MG/ML
2 SPRAY, METERED NASAL 2 TIMES DAILY
Qty: 30 ML | Refills: 12 | Status: SHIPPED | OUTPATIENT
Start: 2025-03-11

## 2025-03-11 NOTE — TELEPHONE ENCOUNTER
Rx Refill Note  Requested Prescriptions     Pending Prescriptions Disp Refills    azelastine (ASTELIN) 0.1 % nasal spray 30 mL 12     Sig: Administer 2 sprays into the nostril(s) as directed by provider 2 (Two) Times a Day. Use in each nostril as directed      Last office visit with prescribing clinician: 10/24/2024   Last telemedicine visit with prescribing clinician: Visit date not found   Next office visit with prescribing clinician: 10/27/2025                         Would you like a call back once the refill request has been completed: [] Yes [] No    If the office needs to give you a call back, can they leave a voicemail: [] Yes [] No    Jailyn Floyd RN  03/11/25, 08:25 CDT

## 2025-07-02 ENCOUNTER — OFFICE VISIT (OUTPATIENT)
Dept: FAMILY MEDICINE CLINIC | Facility: CLINIC | Age: 70
End: 2025-07-02
Payer: MEDICARE

## 2025-07-02 VITALS
OXYGEN SATURATION: 98 % | TEMPERATURE: 100.7 F | HEART RATE: 92 BPM | HEIGHT: 71 IN | DIASTOLIC BLOOD PRESSURE: 67 MMHG | WEIGHT: 115.2 LBS | SYSTOLIC BLOOD PRESSURE: 103 MMHG | RESPIRATION RATE: 18 BRPM | BODY MASS INDEX: 16.13 KG/M2

## 2025-07-02 DIAGNOSIS — B37.9 ANTIBIOTIC-INDUCED YEAST INFECTION: ICD-10-CM

## 2025-07-02 DIAGNOSIS — R63.6 UNDERWEIGHT: ICD-10-CM

## 2025-07-02 DIAGNOSIS — R05.9 COUGH, UNSPECIFIED TYPE: ICD-10-CM

## 2025-07-02 DIAGNOSIS — R30.0 DYSURIA: Primary | ICD-10-CM

## 2025-07-02 DIAGNOSIS — N30.00 ACUTE CYSTITIS WITHOUT HEMATURIA: ICD-10-CM

## 2025-07-02 DIAGNOSIS — T36.95XA ANTIBIOTIC-INDUCED YEAST INFECTION: ICD-10-CM

## 2025-07-02 LAB
BILIRUB BLD-MCNC: NEGATIVE MG/DL
CLARITY, POC: CLEAR
COLOR UR: YELLOW
GLUCOSE UR STRIP-MCNC: NEGATIVE MG/DL
KETONES UR QL: ABNORMAL
LEUKOCYTE EST, POC: NEGATIVE
NITRITE UR-MCNC: NEGATIVE MG/ML
PH UR: 6 [PH] (ref 5–8)
PROT UR STRIP-MCNC: NEGATIVE MG/DL
RBC # UR STRIP: ABNORMAL /UL
SP GR UR: 1.02 (ref 1–1.03)
UROBILINOGEN UR QL: ABNORMAL

## 2025-07-02 PROCEDURE — 99213 OFFICE O/P EST LOW 20 MIN: CPT | Performed by: NURSE PRACTITIONER

## 2025-07-02 PROCEDURE — 1126F AMNT PAIN NOTED NONE PRSNT: CPT | Performed by: NURSE PRACTITIONER

## 2025-07-02 PROCEDURE — 81003 URINALYSIS AUTO W/O SCOPE: CPT | Performed by: NURSE PRACTITIONER

## 2025-07-02 RX ORDER — FLUCONAZOLE 150 MG/1
150 TABLET ORAL
Qty: 2 TABLET | Refills: 0 | Status: SHIPPED | OUTPATIENT
Start: 2025-07-02

## 2025-07-02 RX ORDER — SULFAMETHOXAZOLE AND TRIMETHOPRIM 800; 160 MG/1; MG/1
1 TABLET ORAL 2 TIMES DAILY
Qty: 6 TABLET | Refills: 0 | Status: SHIPPED | OUTPATIENT
Start: 2025-07-02 | End: 2025-07-05

## 2025-07-02 RX ORDER — GUAIFENESIN 600 MG/1
1200 TABLET, EXTENDED RELEASE ORAL 2 TIMES DAILY PRN
Qty: 120 TABLET | Refills: 2 | Status: SHIPPED | OUTPATIENT
Start: 2025-07-02

## 2025-07-02 NOTE — PROGRESS NOTES
Subjective     Chief Complaint   Patient presents with    Dysuria    Back Pain       History of Present Illness  Patient presents today for low back pain with dysuria.  She is having difficulty emptying her bladder.  Started about 10 days ago.  Denies recent antibiotic use.      Patient's PMR from outside medical facility reviewed and noted.    Review of Systems     Otherwise complete ROS reviewed and negative except as mentioned in the HPI.    Past Medical History:   Past Medical History:   Diagnosis Date    Disease of thyroid gland      Past Surgical History:  Past Surgical History:   Procedure Laterality Date    HAND SURGERY      HYSTERECTOMY      with BSO    TONSILLECTOMY       Social History:  reports that she has never smoked. She has never used smokeless tobacco. She reports current alcohol use. She reports that she does not use drugs.    Family History: family history includes Breast cancer in her sister; Breast cancer (age of onset: 80) in her maternal grandmother; Lung cancer in her father; No Known Problems in her mother; Prostate cancer (age of onset: 70) in her maternal grandfather; Prostate cancer (age of onset: 80) in her brother.      Allergies:  Allergies   Allergen Reactions    Barley Grass Shortness Of Breath and Rash    Broccoli [Brassica Oleracea] Shortness Of Breath and Rash    Cinnamon Anaphylaxis    Mustard Shortness Of Breath and Rash     Sore throat     Mustard Anaphylaxis    Peanut-Containing Drug Products Anaphylaxis    Penicillins Shortness Of Breath    Prednisone Anaphylaxis    Bactrim [Sulfamethoxazole-Trimethoprim] Itching and Myalgia     Medications:  Prior to Admission medications    Medication Sig Start Date End Date Taking? Authorizing Provider   albuterol sulfate  (90 Base) MCG/ACT inhaler Inhale 2 puffs Every 4 (Four) Hours As Needed for Wheezing. 5/8/23  Yes Vanessa Regalado APRN   azelastine (ASTELIN) 0.1 % nasal spray Administer 2 sprays into the  nostril(s) as directed by provider 2 (Two) Times a Day. Use in each nostril as directed 3/11/25  Yes Vanessa Regalado APRN   azithromycin (Zithromax Z-Pardeep) 250 MG tablet Take 2 tablets by mouth on day 1, then 1 tablet daily on days 2-5 2/19/24  Yes Ave Canada APRN   b complex vitamins capsule Take 1 capsule by mouth Daily.   Yes Abbi De Jesus MD   calcium carbonate (OS-SHAISTA) 600 MG tablet Take 1 tablet by mouth Daily.   Yes Abbi De Jesus MD   Cholecalciferol (VITAMIN D3) 2000 units capsule Take 1 capsule by mouth Daily.   Yes Abbi De Jseus MD   guaiFENesin (Mucus Relief) 600 MG 12 hr tablet Take 2 tablets by mouth 2 (Two) Times a Day. 3/10/25  Yes Vanessa Regalado APRN   Magnesium Oxide -Mg Supplement 200 MG tablet Take 1 tablet twice a day by oral route.   Yes Abbi De Jesus MD   Omega-3 Fatty Acids (fish oil) 1000 MG capsule capsule Take  by mouth Daily With Breakfast.   Yes Abbi De Jesus MD   Potassium Bicarb-Citric Acid (Effer-K) 10 MEQ effervescent tablet 10 mEq. 4/11/23  Yes Abbi De Jesus MD   thyroid (ARMOUR) 15 MG tablet Take 1 tablet by mouth Daily.   Yes Abbi De Jesus MD   Zinc 50 MG tablet Take 1 tablet by mouth Daily.   Yes Abbi De Jesus MD       ANABELL:      PHQ-9 Depression Screening  Little interest or pleasure in doing things? Not at all   Feeling down, depressed, or hopeless? Not at all   PHQ-2 Total Score 0   Trouble falling or staying asleep, or sleeping too much?     Feeling tired or having little energy?     Poor appetite or overeating?     Feeling bad about yourself - or that you are a failure or have let yourself or your family down?     Trouble concentrating on things, such as reading the newspaper or watching television?     Moving or speaking so slowly that other people could have noticed? Or the opposite - being so fidgety or restless that you have been moving around a lot more than usual?    "  Thoughts that you would be better off dead, or of hurting yourself in some way?     PHQ-9 Total Score     If you checked off any problems, how difficult have these problems made it for you to do your work, take care of things at home, or get along with other people? Not difficult at all       PHQ-9 Total Score:   0  0 (Negative screening for depression)  Support given, observe for worsening symptoms    Objective     Vital Signs: /67 (BP Location: Right arm, Patient Position: Sitting, Cuff Size: Adult)   Pulse 92   Temp (!) 100.7 °F (38.2 °C) (Infrared)   Resp 18   Ht 180.3 cm (71\")   Wt 52.3 kg (115 lb 3.2 oz)   LMP 06/28/2001 (Exact Date)   SpO2 98%   BMI 16.07 kg/m²   Physical Exam  Vitals and nursing note reviewed.   Constitutional:       Appearance: She is underweight.   HENT:      Head: Normocephalic.   Cardiovascular:      Rate and Rhythm: Normal rate and regular rhythm.      Pulses: Normal pulses.      Heart sounds: Normal heart sounds.   Pulmonary:      Effort: Pulmonary effort is normal.      Breath sounds: Normal breath sounds.   Musculoskeletal:         General: Normal range of motion.   Skin:     General: Skin is warm and dry.   Neurological:      General: No focal deficit present.      Mental Status: She is alert and oriented to person, place, and time.   Psychiatric:         Mood and Affect: Mood normal.         Behavior: Behavior normal.           Advance Care Planning            Result Review :    UA          7/2/2025    09:40 7/7/2025    11:42   Urinalysis   Ketones, UA 15 mg/dL  Negative    Leukocytes, UA Negative  Negative             Assessment / Plan     Assessment/Plan     Diagnoses and all orders for this visit:    1. Dysuria (Primary)  -     POCT urinalysis dipstick, multipro  -     Urine Culture - Urine, Urine, Clean Catch    2. Acute cystitis without hematuria  -     Cancel: Urine Culture - Urine, Urine, Clean Catch  -     sulfamethoxazole-trimethoprim (Bactrim DS) 800-160 " MG per tablet; Take 1 tablet by mouth 2 (Two) Times a Day for 3 days.  Dispense: 6 tablet; Refill: 0  -     Cancel: Urine Culture - Urine, Urine, Clean Catch  -     Urine Culture - Urine, Urine, Clean Catch    3. Antibiotic-induced yeast infection  -     fluconazole (Diflucan) 150 MG tablet; Take 1 tablet by mouth Every 3 (Three) Days.  Dispense: 2 tablet; Refill: 0    4. Cough, unspecified type  -     guaiFENesin (Mucus Relief) 600 MG 12 hr tablet; Take 2 tablets by mouth 2 (Two) Times a Day As Needed for Cough.  Dispense: 120 tablet; Refill: 2    5. Underweight               An After Visit Summary was printed and given to the patient at discharge.  Return if symptoms worsen or fail to improve.    I have discussed the patient results/orders and plan/recommendation with them at today's visit.      Ave [place], RASHI   07/02/2025

## 2025-07-04 LAB
BACTERIA UR CULT: NORMAL
BACTERIA UR CULT: NORMAL

## 2025-07-07 ENCOUNTER — OFFICE VISIT (OUTPATIENT)
Dept: INTERNAL MEDICINE | Facility: CLINIC | Age: 70
End: 2025-07-07
Payer: MEDICARE

## 2025-07-07 ENCOUNTER — RESULTS FOLLOW-UP (OUTPATIENT)
Dept: FAMILY MEDICINE CLINIC | Facility: CLINIC | Age: 70
End: 2025-07-07
Payer: MEDICARE

## 2025-07-07 VITALS
TEMPERATURE: 98.4 F | WEIGHT: 115 LBS | DIASTOLIC BLOOD PRESSURE: 71 MMHG | HEIGHT: 71 IN | BODY MASS INDEX: 16.1 KG/M2 | SYSTOLIC BLOOD PRESSURE: 123 MMHG | HEART RATE: 79 BPM | OXYGEN SATURATION: 98 %

## 2025-07-07 DIAGNOSIS — B37.9 ANTIBIOTIC-INDUCED YEAST INFECTION: ICD-10-CM

## 2025-07-07 DIAGNOSIS — R30.0 DYSURIA: Primary | ICD-10-CM

## 2025-07-07 DIAGNOSIS — T36.95XA ANTIBIOTIC-INDUCED YEAST INFECTION: ICD-10-CM

## 2025-07-07 DIAGNOSIS — R35.0 FREQUENT URINATION: ICD-10-CM

## 2025-07-07 LAB
BILIRUB BLD-MCNC: NEGATIVE MG/DL
CLARITY, POC: CLEAR
COLOR UR: YELLOW
GLUCOSE UR STRIP-MCNC: NEGATIVE MG/DL
KETONES UR QL: NEGATIVE
LEUKOCYTE EST, POC: NEGATIVE
NITRITE UR-MCNC: NEGATIVE MG/ML
PH UR: 6 [PH] (ref 5–8)
PROT UR STRIP-MCNC: NEGATIVE MG/DL
RBC # UR STRIP: NEGATIVE /UL
SP GR UR: 1.02 (ref 1–1.03)
UROBILINOGEN UR QL: NORMAL

## 2025-07-07 PROCEDURE — 1126F AMNT PAIN NOTED NONE PRSNT: CPT

## 2025-07-07 PROCEDURE — 81003 URINALYSIS AUTO W/O SCOPE: CPT

## 2025-07-07 PROCEDURE — 99213 OFFICE O/P EST LOW 20 MIN: CPT

## 2025-07-07 PROCEDURE — 1159F MED LIST DOCD IN RCRD: CPT

## 2025-07-07 PROCEDURE — 1160F RVW MEDS BY RX/DR IN RCRD: CPT

## 2025-07-07 RX ORDER — DOXYCYCLINE 100 MG/1
100 CAPSULE ORAL 2 TIMES DAILY
Qty: 14 CAPSULE | Refills: 0 | Status: SHIPPED | OUTPATIENT
Start: 2025-07-07 | End: 2025-07-14

## 2025-07-07 RX ORDER — FLUCONAZOLE 150 MG/1
150 TABLET ORAL ONCE
Qty: 1 TABLET | Refills: 0 | Status: SHIPPED | OUTPATIENT
Start: 2025-07-07 | End: 2025-07-07

## 2025-07-07 NOTE — PROGRESS NOTES
Chief Complaint  Vaginitis    Subjective        [place] WILIAN Trivedi presents to [facility]  History of Present Illness  Patient is a 69-year-old female presenting today with ongoing urinary complaints.  Several weeks ago she reports lying down and accidentally having had a small BM without realizing it. Tried to keep clean and had used antibacterial body wash but still ended up developing symptoms of UTI. Last week she woke up with urinary burning and had seen [facility] and was prescribed antibiotics and fluconazole. She reports having had a reaction to bactrim with developing itching and muscle pain. Stopped the antibiotic with relief of symptoms. Tried a half a tablet of antibiotic with return of redness and muscle pain. She would like a different antibiotic as she continues to experience bladder discomfort and feels like she is getting yeast infection.      Past Medical History:   Diagnosis Date    Disease of thyroid gland      Past Surgical History:   Procedure Laterality Date    HAND SURGERY      HYSTERECTOMY      with BSO    TONSILLECTOMY       Social History     Socioeconomic History    Marital status:    Tobacco Use    Smoking status: Never    Smokeless tobacco: Never   Vaping Use    Vaping status: Never Used   Substance and Sexual Activity    Alcohol use: Yes    Drug use: No    Sexual activity: Yes     Birth control/protection: Post-menopausal     Family History   Problem Relation Age of Onset    Lung cancer Father     No Known Problems Mother     Prostate cancer Brother 80    Breast cancer Sister     Breast cancer Maternal Grandmother 80    Prostate cancer Maternal Grandfather 70    Ovarian cancer Neg Hx     Uterine cancer Neg Hx     Colon cancer Neg Hx     Melanoma Neg Hx        Medications:  Current Outpatient Medications   Medication Sig Dispense Refill    albuterol sulfate  (90 Base) MCG/ACT inhaler Inhale 2 puffs Every 4 (Four) Hours As Needed for Wheezing. 8  "g 1    azelastine (ASTELIN) 0.1 % nasal spray Administer 2 sprays into the nostril(s) as directed by provider 2 (Two) Times a Day. Use in each nostril as directed 30 mL 12    b complex vitamins capsule Take 1 capsule by mouth Daily.      calcium carbonate (OS-SHAISTA) 600 MG tablet Take 1 tablet by mouth Daily.      Cholecalciferol (VITAMIN D3) 2000 units capsule Take 1 capsule by mouth Daily.      fluconazole (Diflucan) 150 MG tablet Take 1 tablet by mouth Every 3 (Three) Days. 2 tablet 0    guaiFENesin (Mucus Relief) 600 MG 12 hr tablet Take 2 tablets by mouth 2 (Two) Times a Day As Needed for Cough. 120 tablet 2    Magnesium Oxide -Mg Supplement 200 MG tablet Take 1 tablet twice a day by oral route.      Omega-3 Fatty Acids (fish oil) 1000 MG capsule capsule Take  by mouth Daily With Breakfast.      Potassium Bicarb-Citric Acid (Effer-K) 10 MEQ effervescent tablet 10 mEq.      thyroid (ARMOUR) 15 MG tablet Take 1 tablet by mouth Daily.      Zinc 50 MG tablet Take 1 tablet by mouth Daily.      doxycycline (VIBRAMYCIN) 100 MG capsule Take 1 capsule by mouth 2 (Two) Times a Day for 7 days. 14 capsule 0    fluconazole (Diflucan) 150 MG tablet Take 1 tablet by mouth 1 (One) Time for 1 dose. 1 tablet 0     No current facility-administered medications for this visit.       Review of Systems  Review of systems is negative unless otherwise specified in HPI.    Objective   Vital Signs:  /71 (BP Location: Left arm, Patient Position: Sitting, Cuff Size: Adult)   Pulse 79   Temp 98.4 °F (36.9 °C) (Infrared)   Ht 180.3 cm (71\")   Wt 52.2 kg (115 lb)   SpO2 98%   BMI 16.04 kg/m²   Estimated body mass index is 16.04 kg/m² as calculated from the following:    Height as of this encounter: 180.3 cm (71\").    Weight as of this encounter: 52.2 kg (115 lb).         Physical Exam  Vitals and nursing note reviewed.   Constitutional:       General: She is not in acute distress.     Appearance: Normal appearance. She is not " ill-appearing.   HENT:      Head: Normocephalic.      Nose: Nose normal.      Mouth/Throat:      Mouth: Mucous membranes are moist.   Cardiovascular:      Rate and Rhythm: Normal rate.   Pulmonary:      Effort: Pulmonary effort is normal. No respiratory distress.   Musculoskeletal:         General: Normal range of motion.      Cervical back: Normal range of motion.   Skin:     General: Skin is warm and dry.   Neurological:      General: No focal deficit present.      Mental Status: She is alert.          Result Review :  The following data was reviewed by: RASHI Kuhn on 07/07/2025:  UA          7/2/2025    09:40 7/7/2025    11:42   Urinalysis   Ketones, UA 15 mg/dL  Negative    Leukocytes, UA Negative  Negative               Assessment and Plan   Diagnoses and all orders for this visit:    1. Dysuria (Primary)  -     doxycycline (VIBRAMYCIN) 100 MG capsule; Take 1 capsule by mouth 2 (Two) Times a Day for 7 days.  Dispense: 14 capsule; Refill: 0    2. Frequent urination  -     POCT urinalysis dipstick, multipro    3. Antibiotic-induced yeast infection  -     fluconazole (Diflucan) 150 MG tablet; Take 1 tablet by mouth 1 (One) Time for 1 dose.  Dispense: 1 tablet; Refill: 0      - Reviewed no signs of infection on urinalysis in office.  Patient continued to experience discomfort and having not completed previous antibiotic will prescribe different antibiotic.  Will send refill on Diflucan with ongoing complaints of yeast infection.           Follow Up   Return if symptoms worsen or fail to improve.  Patient was given instructions and counseling regarding her condition or for health maintenance advice. Please see specific information pulled into the AVS if appropriate.     Signed by:    RASHI Kuhn Date: 07/07/25

## 2025-07-29 ENCOUNTER — TELEPHONE (OUTPATIENT)
Dept: INTERNAL MEDICINE | Facility: CLINIC | Age: 70
End: 2025-07-29
Payer: MEDICARE

## 2025-07-29 DIAGNOSIS — R05.1 ACUTE COUGH: ICD-10-CM

## 2025-07-29 RX ORDER — ALBUTEROL SULFATE 90 UG/1
2 INHALANT RESPIRATORY (INHALATION) EVERY 4 HOURS PRN
Qty: 8 G | Refills: 1 | Status: SHIPPED | OUTPATIENT
Start: 2025-07-29

## 2025-07-29 NOTE — TELEPHONE ENCOUNTER
Patient came in wanting a refill for the albuterol sulfate  inhaler. Patient wants to pick it up at [facility].

## 2025-07-30 ENCOUNTER — OFFICE VISIT (OUTPATIENT)
Dept: INTERNAL MEDICINE | Facility: CLINIC | Age: 70
End: 2025-07-30
Payer: MEDICARE

## 2025-07-30 VITALS
BODY MASS INDEX: 15.26 KG/M2 | OXYGEN SATURATION: 98 % | SYSTOLIC BLOOD PRESSURE: 123 MMHG | WEIGHT: 109 LBS | DIASTOLIC BLOOD PRESSURE: 81 MMHG | TEMPERATURE: 97.6 F | HEART RATE: 112 BPM | HEIGHT: 71 IN

## 2025-07-30 DIAGNOSIS — B37.9 ANTIBIOTIC-INDUCED YEAST INFECTION: ICD-10-CM

## 2025-07-30 DIAGNOSIS — J01.00 ACUTE MAXILLARY SINUSITIS, RECURRENCE NOT SPECIFIED: Primary | ICD-10-CM

## 2025-07-30 DIAGNOSIS — T36.95XA ANTIBIOTIC-INDUCED YEAST INFECTION: ICD-10-CM

## 2025-07-30 RX ORDER — FLUCONAZOLE 150 MG/1
150 TABLET ORAL DAILY
Qty: 10 TABLET | Refills: 0 | Status: SHIPPED | OUTPATIENT
Start: 2025-07-30 | End: 2025-08-09

## 2025-07-30 RX ORDER — AZITHROMYCIN 250 MG/1
TABLET, FILM COATED ORAL
Qty: 6 TABLET | Refills: 0 | Status: SHIPPED | OUTPATIENT
Start: 2025-07-30

## 2025-07-30 NOTE — PROGRESS NOTES
Subjective     Chief Complaint   Patient presents with    allergy problems     Had a temp at home,  cough, congestion, sinus   wants antibiotic         History of Present Illness    Patient's PMR from outside medical facility reviewed and noted.    Lilli Trivedi is a 70 y.o. female who presents for a routine office visit.  The patient states she has significant head congestion with mild associated cough and congestion.   Drainage has been yellowish green in color, as well as any coughed up sputum..  There has been some facial pain and pressure.  Patient reports no fever, myalgias, nausea, vomiting or diarrhea associated with this at this time.  Patient reports that this has been going on for 5 days at this point, and would like something to help with their sinusitis at this time.    Patient has a tendency to develop yeast infections after antibiotics we will also send in some Diflucan for her at this time          Past Medical History:   Past Medical History:   Diagnosis Date    Disease of thyroid gland      Past Surgical History:  Past Surgical History:   Procedure Laterality Date    HAND SURGERY      HYSTERECTOMY      with BSO    TONSILLECTOMY       Social History:  reports that she has never smoked. She has never used smokeless tobacco. She reports current alcohol use. She reports that she does not use drugs.    Family History: family history includes Breast cancer in her sister; Breast cancer (age of onset: 80) in her maternal grandmother; Lung cancer in her father; No Known Problems in her mother; Prostate cancer (age of onset: 70) in her maternal grandfather; Prostate cancer (age of onset: 80) in her brother.      Allergies:  Allergies   Allergen Reactions    Barley Grass Shortness Of Breath and Rash    Broccoli [Brassica Oleracea] Shortness Of Breath and Rash    Cinnamon Anaphylaxis    Mustard Shortness Of Breath and Rash     Sore throat     Mustard Anaphylaxis    Peanut-Containing Drug Products  Anaphylaxis    Penicillins Shortness Of Breath    Prednisone Anaphylaxis    Bactrim [Sulfamethoxazole-Trimethoprim] Itching and Myalgia     Medications:  Prior to Admission medications    Medication Sig Start Date End Date Taking? Authorizing Provider   albuterol sulfate  (90 Base) MCG/ACT inhaler Inhale 2 puffs Every 4 (Four) Hours As Needed for Wheezing. 7/29/25  Yes Vanesas Regalado APRN   azelastine (ASTELIN) 0.1 % nasal spray Administer 2 sprays into the nostril(s) as directed by provider 2 (Two) Times a Day. Use in each nostril as directed 3/11/25  Yes Vanessa Regalado APRN   b complex vitamins capsule Take 1 capsule by mouth Daily.   Yes Abbi De Jesus MD   calcium carbonate (OS-SHAISTA) 600 MG tablet Take 1 tablet by mouth Daily.   Yes Abbi De Jesus MD   Cholecalciferol (VITAMIN D3) 2000 units capsule Take 1 capsule by mouth Daily.   Yes Abbi De Jesus MD   fluconazole (Diflucan) 150 MG tablet Take 1 tablet by mouth Every 3 (Three) Days. 7/2/25  Yes Ave [place] APRN   guaiFENesin (Mucus Relief) 600 MG 12 hr tablet Take 2 tablets by mouth 2 (Two) Times a Day As Needed for Cough. 7/2/25  Yes Ave [place] APRN   Magnesium Oxide -Mg Supplement 200 MG tablet Take 1 tablet twice a day by oral route.   Yes Abbi De Jesus MD   Omega-3 Fatty Acids (fish oil) 1000 MG capsule capsule Take  by mouth Daily With Breakfast.   Yes Abbi De Jesus MD   Potassium Bicarb-Citric Acid (Effer-K) 10 MEQ effervescent tablet 10 mEq. 4/11/23  Yes Abbi De Jesus MD   thyroid (ARMOUR) 15 MG tablet Take 1 tablet by mouth Daily.   Yes Abbi De Jesus MD   Zinc 50 MG tablet Take 1 tablet by mouth Daily.   Yes Abbi De Jesus MD       ANABELL:      PHQ:  The PHQ has not been completed during this encounter.            Review of systems   negative unless otherwise specified above in HPI    Objective     Vital Signs: /81 (BP Location: Left  "arm, Patient Position: Sitting, Cuff Size: Adult)   Pulse 112   Temp 97.6 °F (36.4 °C) (Infrared)   Ht 180.3 cm (71\")   Wt 49.4 kg (109 lb)   LMP 06/28/2001 (Exact Date)   SpO2 98%   BMI 15.20 kg/m²     Physical Exam  Vitals and nursing note reviewed.   Constitutional:       General: She is not in acute distress.     Appearance: Normal appearance.   HENT:      Head: Normocephalic.   Eyes:      Extraocular Movements: Extraocular movements intact.      Pupils: Pupils are equal, round, and reactive to light.   Cardiovascular:      Rate and Rhythm: Normal rate and regular rhythm.      Heart sounds: Normal heart sounds. No murmur heard.  Pulmonary:      Effort: Pulmonary effort is normal. No respiratory distress.      Breath sounds: Normal breath sounds. No rhonchi or rales.   Abdominal:      General: Bowel sounds are normal. There is no distension.      Palpations: Abdomen is soft.   Neurological:      General: No focal deficit present.      Mental Status: She is alert.         BMI is below normal parameters (malnutrition). Recommendations: Information on healthy weight added to patient's after visit summary      Results Reviewed:  Glucose   Date Value Ref Range Status   10/24/2024 86 70 - 99 mg/dL Final   04/13/2023 100 (H) 65 - 99 mg/dL Final     BUN   Date Value Ref Range Status   10/24/2024 20 8 - 27 mg/dL Final   04/13/2023 22 8 - 23 mg/dL Final     Creatinine   Date Value Ref Range Status   10/24/2024 0.75 0.57 - 1.00 mg/dL Final   04/13/2023 0.46 (L) 0.57 - 1.00 mg/dL Final     Sodium   Date Value Ref Range Status   10/24/2024 139 134 - 144 mmol/L Final   04/13/2023 137 136 - 145 mmol/L Final     Potassium   Date Value Ref Range Status   10/24/2024 4.2 3.5 - 5.2 mmol/L Final   04/13/2023 4.0 3.5 - 5.2 mmol/L Final     Comment:     Slight hemolysis detected by analyzer. Results may be affected.     Chloride   Date Value Ref Range Status   10/24/2024 102 96 - 106 mmol/L Final   04/13/2023 100 98 - 107 mmol/L " Final     CO2   Date Value Ref Range Status   04/13/2023 30.0 (H) 22.0 - 29.0 mmol/L Final     Total CO2   Date Value Ref Range Status   10/24/2024 22 20 - 29 mmol/L Final     Calcium   Date Value Ref Range Status   10/24/2024 10.0 8.7 - 10.3 mg/dL Final   04/13/2023 9.7 8.6 - 10.5 mg/dL Final     ALT (SGPT)   Date Value Ref Range Status   10/24/2024 23 0 - 32 IU/L Final   04/12/2023 171 (H) 1 - 33 U/L Final     AST (SGOT)   Date Value Ref Range Status   10/24/2024 26 0 - 40 IU/L Final   04/12/2023 65 (H) 1 - 32 U/L Final     WBC   Date Value Ref Range Status   10/24/2024 8.3 3.4 - 10.8 x10E3/uL Final     Hematocrit   Date Value Ref Range Status   10/24/2024 43.8 34.0 - 46.6 % Final   04/14/2023 39.3 34.0 - 46.6 % Final     Platelets   Date Value Ref Range Status   10/24/2024 241 150 - 450 x10E3/uL Final   04/14/2023 246 140 - 450 10*3/mm3 Final     Total Cholesterol   Date Value Ref Range Status   04/11/2023 162 0 - 200 mg/dL Final     Triglycerides   Date Value Ref Range Status   04/11/2023 93 0 - 150 mg/dL Final     HDL Cholesterol   Date Value Ref Range Status   04/11/2023 66 (H) 40 - 60 mg/dL Final     LDL Cholesterol    Date Value Ref Range Status   04/11/2023 79 0 - 100 mg/dL Final     LDL/HDL Ratio   Date Value Ref Range Status   04/11/2023 1.17  Final     Hemoglobin A1C   Date Value Ref Range Status   12/05/2017 5.10 % Final     Comment:     Less than 6.0           Non-Diabetic Range  6.0-7.0                 ADA Therapeutic Target  Greater than 7.0        Action Suggested               Procedure   Procedures       Assessment / Plan     Assessment/Plan:   Diagnosis Plan   1. Acute maxillary sinusitis, recurrence not specified  azithromycin (Zithromax Z-Pardeep) 250 MG tablet      2. Antibiotic-induced yeast infection  fluconazole (Diflucan) 150 MG tablet            Return if symptoms worsen or fail to improve. unless patient needs to be seen sooner or acute issues arise.      I have discussed the patient  results/orders and and plan/recommendation with them at today's visit.      Signed by:    Jeremiah Nguyễn MD Date: 07/30/25

## (undated) DEVICE — SUTURE VCRL SZ 3-0 L27IN ABSRB UD L26MM SH 1/2 CIR J416H

## (undated) DEVICE — UNDERGLOVE SURG SZ 8 FNGR THK0.21MIL GRN LTX BEAD CUF

## (undated) DEVICE — ZIMMER® STERILE DISPOSABLE TOURNIQUET CUFF WITH PLC, DUAL PORT, SINGLE BLADDER, 18 IN. (46 CM)

## (undated) DEVICE — ADHESIVE SKIN CLSR 0.7ML TOP DERMBND ADV

## (undated) DEVICE — BIT DRL L96MM DIA1.5MM MINI QUIK CPL CALIB W/O STP REUSE

## (undated) DEVICE — AMBU AURA-I U SIZE 4, DISPOSABLE LARYNGEAL MASK: Brand: AURA-I

## (undated) DEVICE — C-ARM: Brand: UNBRANDED

## (undated) DEVICE — SURGICAL PROCEDURE PACK LOWER EXTREMITY LOURDES HOSP

## (undated) DEVICE — GLOVE SURG SZ 8 CRM LTX FREE POLYISOPRENE POLYMER BEAD ANTI

## (undated) DEVICE — PTFE FELT: Brand: DEROYAL